# Patient Record
Sex: MALE | Race: WHITE | Employment: OTHER | URBAN - METROPOLITAN AREA
[De-identification: names, ages, dates, MRNs, and addresses within clinical notes are randomized per-mention and may not be internally consistent; named-entity substitution may affect disease eponyms.]

---

## 2017-05-26 PROBLEM — R33.9 URINARY RETENTION: Status: ACTIVE | Noted: 2017-05-26

## 2017-05-29 PROBLEM — N40.0 BPH (BENIGN PROSTATIC HYPERPLASIA): Status: ACTIVE | Noted: 2017-05-29

## 2017-05-29 PROBLEM — A41.9 SEPSIS (HCC): Status: ACTIVE | Noted: 2017-05-29

## 2017-05-29 PROBLEM — E11.9 DIABETES MELLITUS (HCC): Status: ACTIVE | Noted: 2017-05-29

## 2017-05-29 PROBLEM — N39.0 UTI (URINARY TRACT INFECTION): Status: ACTIVE | Noted: 2017-05-29

## 2018-09-26 PROBLEM — N39.0 UTI (URINARY TRACT INFECTION): Status: RESOLVED | Noted: 2017-05-29 | Resolved: 2018-09-26

## 2019-05-16 ENCOUNTER — OFFICE VISIT (OUTPATIENT)
Dept: FAMILY MEDICINE CLINIC | Age: 64
End: 2019-05-16
Payer: MEDICARE

## 2019-05-16 VITALS
HEART RATE: 112 BPM | HEIGHT: 67 IN | OXYGEN SATURATION: 95 % | SYSTOLIC BLOOD PRESSURE: 148 MMHG | WEIGHT: 176.5 LBS | BODY MASS INDEX: 27.7 KG/M2 | DIASTOLIC BLOOD PRESSURE: 98 MMHG

## 2019-05-16 DIAGNOSIS — E11.9 TYPE 2 DIABETES MELLITUS WITHOUT COMPLICATION, WITHOUT LONG-TERM CURRENT USE OF INSULIN (HCC): Primary | ICD-10-CM

## 2019-05-16 DIAGNOSIS — E78.5 HYPERLIPIDEMIA, UNSPECIFIED HYPERLIPIDEMIA TYPE: ICD-10-CM

## 2019-05-16 DIAGNOSIS — N40.0 BENIGN PROSTATIC HYPERPLASIA WITHOUT LOWER URINARY TRACT SYMPTOMS: ICD-10-CM

## 2019-05-16 DIAGNOSIS — I10 ESSENTIAL HYPERTENSION: ICD-10-CM

## 2019-05-16 LAB — HBA1C MFR BLD: 7.6 %

## 2019-05-16 PROCEDURE — 3017F COLORECTAL CA SCREEN DOC REV: CPT | Performed by: INTERNAL MEDICINE

## 2019-05-16 PROCEDURE — 2022F DILAT RTA XM EVC RTNOPTHY: CPT | Performed by: INTERNAL MEDICINE

## 2019-05-16 PROCEDURE — 1123F ACP DISCUSS/DSCN MKR DOCD: CPT | Performed by: INTERNAL MEDICINE

## 2019-05-16 PROCEDURE — 1036F TOBACCO NON-USER: CPT | Performed by: INTERNAL MEDICINE

## 2019-05-16 PROCEDURE — G8427 DOCREV CUR MEDS BY ELIG CLIN: HCPCS | Performed by: INTERNAL MEDICINE

## 2019-05-16 PROCEDURE — 4040F PNEUMOC VAC/ADMIN/RCVD: CPT | Performed by: INTERNAL MEDICINE

## 2019-05-16 PROCEDURE — 99204 OFFICE O/P NEW MOD 45 MIN: CPT | Performed by: INTERNAL MEDICINE

## 2019-05-16 PROCEDURE — 83036 HEMOGLOBIN GLYCOSYLATED A1C: CPT | Performed by: INTERNAL MEDICINE

## 2019-05-16 PROCEDURE — 3045F PR MOST RECENT HEMOGLOBIN A1C LEVEL 7.0-9.0%: CPT | Performed by: INTERNAL MEDICINE

## 2019-05-16 PROCEDURE — G8419 CALC BMI OUT NRM PARAM NOF/U: HCPCS | Performed by: INTERNAL MEDICINE

## 2019-05-16 ASSESSMENT — PATIENT HEALTH QUESTIONNAIRE - PHQ9
1. LITTLE INTEREST OR PLEASURE IN DOING THINGS: 0
2. FEELING DOWN, DEPRESSED OR HOPELESS: 0
SUM OF ALL RESPONSES TO PHQ QUESTIONS 1-9: 0
SUM OF ALL RESPONSES TO PHQ QUESTIONS 1-9: 0
SUM OF ALL RESPONSES TO PHQ9 QUESTIONS 1 & 2: 0

## 2019-05-16 NOTE — PROGRESS NOTES
Rontrace Jacobsen   1955      Reason for visit:   Chief Complaint   Patient presents with    Established New Doctor    Diabetes    Hypertension        HPI:  Patient presented establish care. He recently moved to the area from [place], [place] to be closer to his children. He was being seen by family practitioner there, Dr. Alexandra Arambula. He has a history of type 2 diabetes mellitus. His last A1c at the end of March was 8.7 per chart review. He reports since that time he has been working diligently on his diet, cutting out sugar and carbohydrates. He remains on Janumet and glipizide. He denies any complications from his diabetes including neuropathy, retinopathy, nephropathy. He does plan to schedule an appointment with an ophthalmologist for annual exam. He has history of hypertension currently managed with amlodipine. His blood pressure is elevated today. He states that his last doctor had taken him off of lisinopril but he does not recall the reason why. He denies any chest pain, shortness of breath, edema. He is on statin for hyperlipidemia without myopathy reported. He has a history of BPH and his urinary symptoms are controlled on flomax. He feels well and has no concerns today otherwise. He is a nonsomker. He had a colonoscopy ~7 years ago. He had a brother with colon cancer. Of note, I did see patients wife after seeing him. She relayed recent loss of their son to suicide. unfortunately I did not get a chance to discuss this with patient.     Past Medical History:   Diagnosis Date    Diabetes mellitus (HCC)     High blood pressure            Current Outpatient Medications:     tamsulosin (FLOMAX) 0.4 MG capsule, Take 1 capsule by mouth daily, Disp: 30 capsule, Rfl: 3    Cholecalciferol (VITAMIN D3) 2000 UNITS CAPS, Take 2,000 Units by mouth daily, Disp: , Rfl:     ibuprofen (ADVIL;MOTRIN) 800 MG tablet, Take 800 mg by mouth every 8 hours as needed, Disp: , Rfl:     Omega-3 Fatty Acids (FISH OIL) 1000 MG CAPS, Take 1,000 mg by mouth daily, Disp: , Rfl:     Ascorbic Acid (VITAMIN C) 1000 MG tablet, Take 1,000 mg by mouth daily, Disp: , Rfl:     Multiple Vitamins-Minerals (THERAPEUTIC MULTIVITAMIN-MINERALS) tablet, Take 1 tablet by mouth daily, Disp: , Rfl:     omeprazole (PRILOSEC) 20 MG capsule, Take 20 mg by mouth daily. , Disp: , Rfl:     amLODIPine (NORVASC) 10 MG tablet, Take 10 mg by mouth daily. , Disp: , Rfl:     lovastatin (MEVACOR) 20 MG tablet, Take 20 mg by mouth nightly., Disp: , Rfl:     sitaGLIPtan-metformin (JANUMET)  MG per tablet, Take 1 tablet by mouth 2 times daily (with meals). , Disp: , Rfl:     lisinopril (PRINIVIL;ZESTRIL) 20 MG tablet, Take 30 mg by mouth daily , Disp: , Rfl:     glipiZIDE (GLUCOTROL) 5 MG tablet, Take 5 mg by mouth every evening , Disp: , Rfl:      No Known Allergies    Past Surgical History:   Procedure Laterality Date    APPENDECTOMY  01/01/1984        Family History   Problem Relation Age of Onset    Kidney Disease Mother     Diabetes Mother     Cancer Brother         Social History     Socioeconomic History    Marital status:      Spouse name: Not on file    Number of children: Not on file    Years of education: Not on file    Highest education level: Not on file   Occupational History    Not on file   Social Needs    Financial resource strain: Not on file    Food insecurity:     Worry: Not on file     Inability: Not on file    Transportation needs:     Medical: Not on file     Non-medical: Not on file   Tobacco Use    Smoking status: Never Smoker    Smokeless tobacco: Never Used   Substance and Sexual Activity    Alcohol use: No    Drug use: No    Sexual activity: Not on file   Lifestyle    Physical activity:     Days per week: Not on file     Minutes per session: Not on file    Stress: Not on file   Relationships    Social connections:     Talks on phone: Not on file     Gets together: Not on file     Attends Latter-day service: Not on file     Active member of club or organization: Not on file     Attends meetings of clubs or organizations: Not on file     Relationship status: Not on file    Intimate partner violence:     Fear of current or ex partner: Not on file     Emotionally abused: Not on file     Physically abused: Not on file     Forced sexual activity: Not on file   Other Topics Concern    Not on file   Social History Narrative    Not on file       Review of Systems   Constitutional: Negative for chills, fatigue, fever and unexpected weight change. HENT: Negative for congestion, ear pain, sore throat and trouble swallowing. Eyes: Negative for pain and redness. Respiratory: Negative for cough and shortness of breath. Cardiovascular: Negative for chest pain, palpitations and leg swelling. Gastrointestinal: Negative for abdominal pain, blood in stool, constipation, diarrhea, nausea and vomiting. Endocrine: Negative for cold intolerance, heat intolerance, polydipsia and polyuria. Genitourinary: Negative for dysuria, frequency and hematuria. Musculoskeletal: Negative for arthralgias, myalgias and joint swelling. Skin: Negative for rash. Neurological: Negative for weakness, numbness and headaches. Hematological: Negative for adenopathy. Does not bruise/bleed easily. Psychiatric/Behavioral: Negative for sleep disturbance. The patient is not nervous/anxious. No report of depression    Physical Exam:  BP (!) 148/98   Pulse 112   Ht 5' 7\" (1.702 m)   Wt 176 lb 8 oz (80.1 kg)   SpO2 95%   BMI 27.64 kg/m²   Constitutional: appears well-developed and well-nourished. Head: Normocephalic and atraumatic. Eyes: Pupils are equal, round, and reactive to light.  Conjunctivae and EOM are normal.   Right Ear: External ear normal. TM normal  Left Ear: External ear normal. TM normal  Nose: Nose normal.   Mouth/Throat: Oropharynx is clear and moist.   Cardiovascular: Normal rate, regular rhythm and normal heart sounds. No murmur heard. Pulmonary/Chest: Effort normal. No respiratory distress. No wheezes, rhonchi, or crackles  Abdominal: Soft. Bowel sounds are normal. No distension. There is no tenderness. Musculoskeletal: Normal range of motion. No edema, tenderness or deformity. Lymphadenopathy: No cervical adenopathy. Neurological: alert. No cranial nerve deficit. Skin: Skin is warm and dry. Capillary refill takes less than 2 seconds. No rash noted. Psychiatric: Normal mood and affect. Assessment and Plan: Moi Gold is a 70 y.o. male presenting today to establish care. Amparo Landers was seen today for established new doctor, diabetes and hypertension. Diagnoses and all orders for this visit:    Type 2 diabetes mellitus without complication, without long-term current use of insulin (HCC)  a1c is improving with diet change. Will follow again in 3 months. He is encouraged to continue lifestyle changes. Make appt with ophthalmology. Continue current medication regimen  -     POCT glycosylated hemoglobin (Hb A1C)    Hyperlipidemia, unspecified hyperlipidemia type  Continue statin  Benign prostatic hyperplasia without lower urinary tract symptoms  Continue a blocker  Essential hypertension  bp above goal. Id like him on an ace-I/arb. We will obtain prior records to review reason for cessation prior to resuming. He will request today. Discussed colonoscopy. rec every 5 years due to family hx. He will consider    Will inquire about mood/grief at next visit    RTC in 3 months. Welcome to Medicare at that time. Spencer Cuellar M.D.   Internal Medicine and Pediatrics  [facility]

## 2019-06-05 ENCOUNTER — TELEPHONE (OUTPATIENT)
Dept: FAMILY MEDICINE CLINIC | Age: 64
End: 2019-06-05

## 2019-06-05 DIAGNOSIS — M75.20 BICEPS TENDONITIS: ICD-10-CM

## 2019-06-05 DIAGNOSIS — M25.512 SHOULDER PAIN, LEFT: ICD-10-CM

## 2019-06-05 NOTE — TELEPHONE ENCOUNTER
Requesting refills on : Amlodipine 10 mg takes 1qd, lovastatin 20 mg takes 1 qd, glipiside 5 mg takes 1 qd, omeprazole 20 mg takes 1 qd, tamsulosin hcl  . 4 mg takes 1 qd, vitamin D 2000 u takes 1 qd, and ibuprofen 800 mg takes prn. Please call into [employer] [phone].  [facility] [phone]

## 2019-06-06 ENCOUNTER — TELEPHONE (OUTPATIENT)
Dept: FAMILY MEDICINE CLINIC | Age: 64
End: 2019-06-06

## 2019-06-06 RX ORDER — ACETAMINOPHEN 160 MG
2000 TABLET,DISINTEGRATING ORAL DAILY
Qty: 30 CAPSULE | Refills: 3 | Status: SHIPPED | OUTPATIENT
Start: 2019-06-06 | End: 2019-11-07 | Stop reason: SDUPTHER

## 2019-06-06 RX ORDER — OMEPRAZOLE 20 MG/1
20 CAPSULE, DELAYED RELEASE ORAL DAILY
Qty: 30 CAPSULE | Refills: 3 | Status: SHIPPED | OUTPATIENT
Start: 2019-06-06 | End: 2019-10-22 | Stop reason: SDUPTHER

## 2019-06-06 RX ORDER — TAMSULOSIN HYDROCHLORIDE 0.4 MG/1
0.4 CAPSULE ORAL DAILY
Qty: 30 CAPSULE | Refills: 3 | Status: SHIPPED | OUTPATIENT
Start: 2019-06-06 | End: 2019-11-07 | Stop reason: SDUPTHER

## 2019-06-06 RX ORDER — GLIPIZIDE 5 MG/1
5 TABLET ORAL EVERY EVENING
Qty: 30 TABLET | Refills: 3 | Status: ON HOLD | OUTPATIENT
Start: 2019-06-06 | End: 2019-06-25 | Stop reason: SDUPTHER

## 2019-06-06 RX ORDER — AMLODIPINE BESYLATE 10 MG/1
10 TABLET ORAL DAILY
Qty: 30 TABLET | Refills: 3 | Status: SHIPPED | OUTPATIENT
Start: 2019-06-06 | End: 2019-10-22 | Stop reason: SDUPTHER

## 2019-06-06 RX ORDER — LOVASTATIN 20 MG/1
20 TABLET ORAL NIGHTLY
Qty: 30 TABLET | Refills: 3 | Status: SHIPPED | OUTPATIENT
Start: 2019-06-06 | End: 2019-10-22 | Stop reason: SDUPTHER

## 2019-06-06 RX ORDER — IBUPROFEN 800 MG/1
800 TABLET ORAL PRN
Qty: 30 TABLET | Refills: 3 | Status: SHIPPED | OUTPATIENT
Start: 2019-06-06 | End: 2020-02-06 | Stop reason: ALTCHOICE

## 2019-06-18 ENCOUNTER — HOSPITAL ENCOUNTER (INPATIENT)
Age: 64
LOS: 7 days | Discharge: HOME | DRG: 853 | End: 2019-06-25
Attending: INTERNAL MEDICINE | Admitting: INTERNAL MEDICINE
Payer: MEDICARE

## 2019-06-18 ENCOUNTER — APPOINTMENT (OUTPATIENT)
Dept: ULTRASOUND IMAGING | Age: 64
DRG: 853 | End: 2019-06-18
Payer: MEDICARE

## 2019-06-18 ENCOUNTER — APPOINTMENT (OUTPATIENT)
Dept: CT IMAGING | Age: 64
DRG: 853 | End: 2019-06-18
Payer: MEDICARE

## 2019-06-18 ENCOUNTER — ANESTHESIA EVENT (OUTPATIENT)
Dept: OPERATING ROOM | Age: 64
DRG: 853 | End: 2019-06-18
Payer: MEDICARE

## 2019-06-18 ENCOUNTER — ANESTHESIA (OUTPATIENT)
Dept: OPERATING ROOM | Age: 64
DRG: 853 | End: 2019-06-18
Payer: MEDICARE

## 2019-06-18 VITALS — DIASTOLIC BLOOD PRESSURE: 65 MMHG | SYSTOLIC BLOOD PRESSURE: 102 MMHG | OXYGEN SATURATION: 91 %

## 2019-06-18 DIAGNOSIS — A41.9 SEPSIS, DUE TO UNSPECIFIED ORGANISM: ICD-10-CM

## 2019-06-18 DIAGNOSIS — M75.20 BICEPS TENDONITIS: ICD-10-CM

## 2019-06-18 DIAGNOSIS — M25.512 SHOULDER PAIN, LEFT: ICD-10-CM

## 2019-06-18 DIAGNOSIS — N20.1 URETEROLITHIASIS: Primary | ICD-10-CM

## 2019-06-18 LAB
A/G RATIO: 1.1 (ref 1.1–2.2)
ALBUMIN SERPL-MCNC: 4.2 G/DL (ref 3.4–5)
ALP BLD-CCNC: 68 U/L (ref 40–129)
ALT SERPL-CCNC: 8 U/L (ref 10–40)
ANION GAP SERPL CALCULATED.3IONS-SCNC: 13 MMOL/L (ref 3–16)
AST SERPL-CCNC: 13 U/L (ref 15–37)
BASOPHILS ABSOLUTE: 0 K/UL (ref 0–0.2)
BASOPHILS RELATIVE PERCENT: 0.2 %
BILIRUB SERPL-MCNC: 0.8 MG/DL (ref 0–1)
BILIRUBIN URINE: NEGATIVE
BLOOD, URINE: ABNORMAL
BUN BLDV-MCNC: 16 MG/DL (ref 7–20)
CALCIUM SERPL-MCNC: 10.1 MG/DL (ref 8.3–10.6)
CHLORIDE BLD-SCNC: 99 MMOL/L (ref 99–110)
CLARITY: CLEAR
CO2: 27 MMOL/L (ref 21–32)
COLOR: YELLOW
COMMENT UA: ABNORMAL
CREAT SERPL-MCNC: 1.1 MG/DL (ref 0.8–1.3)
EOSINOPHILS ABSOLUTE: 0 K/UL (ref 0–0.6)
EOSINOPHILS RELATIVE PERCENT: 0.2 %
EPITHELIAL CELLS, UA: 2 /HPF (ref 0–5)
GFR AFRICAN AMERICAN: >60
GFR NON-AFRICAN AMERICAN: >60
GLOBULIN: 3.7 G/DL
GLUCOSE BLD-MCNC: 128 MG/DL (ref 70–99)
GLUCOSE BLD-MCNC: 134 MG/DL (ref 70–99)
GLUCOSE BLD-MCNC: 183 MG/DL (ref 70–99)
GLUCOSE URINE: NEGATIVE MG/DL
HCT VFR BLD CALC: 39.5 % (ref 40.5–52.5)
HEMOGLOBIN: 13.3 G/DL (ref 13.5–17.5)
HYALINE CASTS: 5 /LPF (ref 0–8)
KETONES, URINE: NEGATIVE MG/DL
LACTIC ACID, SEPSIS: 1.2 MMOL/L (ref 0.4–1.9)
LACTIC ACID: 1.2 MMOL/L (ref 0.4–2)
LACTIC ACID: 1.4 MMOL/L (ref 0.4–2)
LEUKOCYTE ESTERASE, URINE: NEGATIVE
LYMPHOCYTES ABSOLUTE: 0.4 K/UL (ref 1–5.1)
LYMPHOCYTES RELATIVE PERCENT: 2.3 %
MCH RBC QN AUTO: 28.9 PG (ref 26–34)
MCHC RBC AUTO-ENTMCNC: 33.7 G/DL (ref 31–36)
MCV RBC AUTO: 85.8 FL (ref 80–100)
MICROSCOPIC EXAMINATION: YES
MONOCYTES ABSOLUTE: 0.5 K/UL (ref 0–1.3)
MONOCYTES RELATIVE PERCENT: 2.6 %
NEUTROPHILS ABSOLUTE: 17.3 K/UL (ref 1.7–7.7)
NEUTROPHILS RELATIVE PERCENT: 94.7 %
NITRITE, URINE: NEGATIVE
PDW BLD-RTO: 14.9 % (ref 12.4–15.4)
PERFORMED ON: ABNORMAL
PERFORMED ON: ABNORMAL
PH UA: 6.5 (ref 5–8)
PLATELET # BLD: 219 K/UL (ref 135–450)
PMV BLD AUTO: 7 FL (ref 5–10.5)
POTASSIUM REFLEX MAGNESIUM: 4 MMOL/L (ref 3.5–5.1)
PROTEIN UA: >=300 MG/DL
RBC # BLD: 4.61 M/UL (ref 4.2–5.9)
RBC UA: 12 /HPF (ref 0–4)
SODIUM BLD-SCNC: 139 MMOL/L (ref 136–145)
SPECIFIC GRAVITY UA: >1.03 (ref 1–1.03)
TOTAL PROTEIN: 7.9 G/DL (ref 6.4–8.2)
URINE REFLEX TO CULTURE: YES
URINE TYPE: ABNORMAL
UROBILINOGEN, URINE: 0.2 E.U./DL
WBC # BLD: 18.3 K/UL (ref 4–11)
WBC UA: 18 /HPF (ref 0–5)

## 2019-06-18 PROCEDURE — 7100000001 HC PACU RECOVERY - ADDTL 15 MIN: Performed by: UROLOGY

## 2019-06-18 PROCEDURE — 85025 COMPLETE CBC W/AUTO DIFF WBC: CPT

## 2019-06-18 PROCEDURE — 2580000003 HC RX 258: Performed by: INTERNAL MEDICINE

## 2019-06-18 PROCEDURE — 51798 US URINE CAPACITY MEASURE: CPT

## 2019-06-18 PROCEDURE — 6360000004 HC RX CONTRAST MEDICATION

## 2019-06-18 PROCEDURE — 2580000003 HC RX 258: Performed by: PHYSICIAN ASSISTANT

## 2019-06-18 PROCEDURE — 2580000003 HC RX 258: Performed by: UROLOGY

## 2019-06-18 PROCEDURE — C2617 STENT, NON-COR, TEM W/O DEL: HCPCS | Performed by: UROLOGY

## 2019-06-18 PROCEDURE — 6360000002 HC RX W HCPCS: Performed by: INTERNAL MEDICINE

## 2019-06-18 PROCEDURE — 87040 BLOOD CULTURE FOR BACTERIA: CPT

## 2019-06-18 PROCEDURE — 3700000000 HC ANESTHESIA ATTENDED CARE: Performed by: UROLOGY

## 2019-06-18 PROCEDURE — 83605 ASSAY OF LACTIC ACID: CPT

## 2019-06-18 PROCEDURE — 2709999900 HC NON-CHARGEABLE SUPPLY: Performed by: UROLOGY

## 2019-06-18 PROCEDURE — 99285 EMERGENCY DEPT VISIT HI MDM: CPT

## 2019-06-18 PROCEDURE — 6370000000 HC RX 637 (ALT 250 FOR IP): Performed by: PHYSICIAN ASSISTANT

## 2019-06-18 PROCEDURE — 36415 COLL VENOUS BLD VENIPUNCTURE: CPT

## 2019-06-18 PROCEDURE — 81001 URINALYSIS AUTO W/SCOPE: CPT

## 2019-06-18 PROCEDURE — 6360000002 HC RX W HCPCS: Performed by: NURSE ANESTHETIST, CERTIFIED REGISTERED

## 2019-06-18 PROCEDURE — 87086 URINE CULTURE/COLONY COUNT: CPT

## 2019-06-18 PROCEDURE — 80053 COMPREHEN METABOLIC PANEL: CPT

## 2019-06-18 PROCEDURE — 74177 CT ABD & PELVIS W/CONTRAST: CPT

## 2019-06-18 PROCEDURE — 2580000003 HC RX 258: Performed by: NURSE ANESTHETIST, CERTIFIED REGISTERED

## 2019-06-18 PROCEDURE — 96365 THER/PROPH/DIAG IV INF INIT: CPT

## 2019-06-18 PROCEDURE — 6370000000 HC RX 637 (ALT 250 FOR IP): Performed by: INTERNAL MEDICINE

## 2019-06-18 PROCEDURE — 3600000002 HC SURGERY LEVEL 2 BASE: Performed by: UROLOGY

## 2019-06-18 PROCEDURE — 6360000002 HC RX W HCPCS: Performed by: PHYSICIAN ASSISTANT

## 2019-06-18 PROCEDURE — 96375 TX/PRO/DX INJ NEW DRUG ADDON: CPT

## 2019-06-18 PROCEDURE — 7100000000 HC PACU RECOVERY - FIRST 15 MIN: Performed by: UROLOGY

## 2019-06-18 PROCEDURE — 1200000000 HC SEMI PRIVATE

## 2019-06-18 PROCEDURE — 3600000012 HC SURGERY LEVEL 2 ADDTL 15MIN: Performed by: UROLOGY

## 2019-06-18 PROCEDURE — 3700000001 HC ADD 15 MINUTES (ANESTHESIA): Performed by: UROLOGY

## 2019-06-18 PROCEDURE — C1769 GUIDE WIRE: HCPCS | Performed by: UROLOGY

## 2019-06-18 PROCEDURE — 2500000003 HC RX 250 WO HCPCS: Performed by: NURSE ANESTHETIST, CERTIFIED REGISTERED

## 2019-06-18 PROCEDURE — 76870 US EXAM SCROTUM: CPT

## 2019-06-18 PROCEDURE — 96361 HYDRATE IV INFUSION ADD-ON: CPT

## 2019-06-18 PROCEDURE — 0T778DZ DILATION OF LEFT URETER WITH INTRALUMINAL DEVICE, VIA NATURAL OR ARTIFICIAL OPENING ENDOSCOPIC: ICD-10-PCS | Performed by: UROLOGY

## 2019-06-18 DEVICE — STENT URET 6FR L26CM PERCFLX HYDR+ TAPR TIP GRAD: Type: IMPLANTABLE DEVICE | Site: URETER | Status: FUNCTIONAL

## 2019-06-18 RX ORDER — 0.9 % SODIUM CHLORIDE 0.9 %
1000 INTRAVENOUS SOLUTION INTRAVENOUS ONCE
Status: COMPLETED | OUTPATIENT
Start: 2019-06-18 | End: 2019-06-18

## 2019-06-18 RX ORDER — CHLORAL HYDRATE 500 MG
1000 CAPSULE ORAL DAILY
Status: DISCONTINUED | OUTPATIENT
Start: 2019-06-18 | End: 2019-06-18 | Stop reason: CLARIF

## 2019-06-18 RX ORDER — FENTANYL CITRATE 50 UG/ML
25 INJECTION, SOLUTION INTRAMUSCULAR; INTRAVENOUS EVERY 5 MIN PRN
Status: DISCONTINUED | OUTPATIENT
Start: 2019-06-18 | End: 2019-06-18 | Stop reason: HOSPADM

## 2019-06-18 RX ORDER — AMLODIPINE BESYLATE 5 MG/1
10 TABLET ORAL ONCE
Status: COMPLETED | OUTPATIENT
Start: 2019-06-18 | End: 2019-06-18

## 2019-06-18 RX ORDER — TERAZOSIN 2 MG/1
2 CAPSULE ORAL DAILY
COMMUNITY
End: 2019-11-07 | Stop reason: SDUPTHER

## 2019-06-18 RX ORDER — ONDANSETRON 2 MG/ML
4 INJECTION INTRAMUSCULAR; INTRAVENOUS
Status: DISCONTINUED | OUTPATIENT
Start: 2019-06-18 | End: 2019-06-18 | Stop reason: HOSPADM

## 2019-06-18 RX ORDER — OXYCODONE HYDROCHLORIDE AND ACETAMINOPHEN 5; 325 MG/1; MG/1
2 TABLET ORAL PRN
Status: DISCONTINUED | OUTPATIENT
Start: 2019-06-18 | End: 2019-06-18 | Stop reason: HOSPADM

## 2019-06-18 RX ORDER — POTASSIUM CHLORIDE 20 MEQ/1
40 TABLET, EXTENDED RELEASE ORAL PRN
Status: DISCONTINUED | OUTPATIENT
Start: 2019-06-18 | End: 2019-06-25 | Stop reason: HOSPADM

## 2019-06-18 RX ORDER — SODIUM CHLORIDE 0.9 % (FLUSH) 0.9 %
10 SYRINGE (ML) INJECTION PRN
Status: DISCONTINUED | OUTPATIENT
Start: 2019-06-18 | End: 2019-06-18 | Stop reason: SDUPTHER

## 2019-06-18 RX ORDER — LOVASTATIN 10 MG/1
20 TABLET ORAL NIGHTLY
Status: DISCONTINUED | OUTPATIENT
Start: 2019-06-18 | End: 2019-06-25 | Stop reason: HOSPADM

## 2019-06-18 RX ORDER — PROPOFOL 10 MG/ML
INJECTION, EMULSION INTRAVENOUS PRN
Status: DISCONTINUED | OUTPATIENT
Start: 2019-06-18 | End: 2019-06-18 | Stop reason: SDUPTHER

## 2019-06-18 RX ORDER — ONDANSETRON 2 MG/ML
4 INJECTION INTRAMUSCULAR; INTRAVENOUS EVERY 6 HOURS PRN
Status: DISCONTINUED | OUTPATIENT
Start: 2019-06-18 | End: 2019-06-25 | Stop reason: HOSPADM

## 2019-06-18 RX ORDER — SODIUM CHLORIDE 9 MG/ML
INJECTION, SOLUTION INTRAVENOUS CONTINUOUS
Status: DISCONTINUED | OUTPATIENT
Start: 2019-06-18 | End: 2019-06-21

## 2019-06-18 RX ORDER — LIDOCAINE HYDROCHLORIDE 20 MG/ML
INJECTION, SOLUTION EPIDURAL; INFILTRATION; INTRACAUDAL; PERINEURAL PRN
Status: DISCONTINUED | OUTPATIENT
Start: 2019-06-18 | End: 2019-06-18 | Stop reason: SDUPTHER

## 2019-06-18 RX ORDER — MAGNESIUM HYDROXIDE 1200 MG/15ML
LIQUID ORAL
Status: COMPLETED | OUTPATIENT
Start: 2019-06-18 | End: 2019-06-18

## 2019-06-18 RX ORDER — DEXTROSE MONOHYDRATE 25 G/50ML
12.5 INJECTION, SOLUTION INTRAVENOUS PRN
Status: DISCONTINUED | OUTPATIENT
Start: 2019-06-18 | End: 2019-06-25 | Stop reason: HOSPADM

## 2019-06-18 RX ORDER — TAMSULOSIN HYDROCHLORIDE 0.4 MG/1
0.4 CAPSULE ORAL DAILY
Status: DISCONTINUED | OUTPATIENT
Start: 2019-06-18 | End: 2019-06-25 | Stop reason: HOSPADM

## 2019-06-18 RX ORDER — NICOTINE 21 MG/24HR
1 PATCH, TRANSDERMAL 24 HOURS TRANSDERMAL DAILY PRN
Status: DISCONTINUED | OUTPATIENT
Start: 2019-06-18 | End: 2019-06-25 | Stop reason: HOSPADM

## 2019-06-18 RX ORDER — SODIUM CHLORIDE, SODIUM LACTATE, POTASSIUM CHLORIDE, CALCIUM CHLORIDE 600; 310; 30; 20 MG/100ML; MG/100ML; MG/100ML; MG/100ML
INJECTION, SOLUTION INTRAVENOUS CONTINUOUS PRN
Status: DISCONTINUED | OUTPATIENT
Start: 2019-06-18 | End: 2019-06-18 | Stop reason: SDUPTHER

## 2019-06-18 RX ORDER — MORPHINE SULFATE 2 MG/ML
2 INJECTION, SOLUTION INTRAMUSCULAR; INTRAVENOUS
Status: DISCONTINUED | OUTPATIENT
Start: 2019-06-18 | End: 2019-06-25 | Stop reason: HOSPADM

## 2019-06-18 RX ORDER — FENTANYL CITRATE 50 UG/ML
INJECTION, SOLUTION INTRAMUSCULAR; INTRAVENOUS PRN
Status: DISCONTINUED | OUTPATIENT
Start: 2019-06-18 | End: 2019-06-18 | Stop reason: SDUPTHER

## 2019-06-18 RX ORDER — HYDROCODONE BITARTRATE AND ACETAMINOPHEN 5; 325 MG/1; MG/1
1 TABLET ORAL EVERY 4 HOURS PRN
Status: DISCONTINUED | OUTPATIENT
Start: 2019-06-18 | End: 2019-06-25 | Stop reason: HOSPADM

## 2019-06-18 RX ORDER — FAMOTIDINE 20 MG/1
20 TABLET, FILM COATED ORAL 2 TIMES DAILY PRN
Status: DISCONTINUED | OUTPATIENT
Start: 2019-06-18 | End: 2019-06-25 | Stop reason: HOSPADM

## 2019-06-18 RX ORDER — MAGNESIUM SULFATE 1 G/100ML
1 INJECTION INTRAVENOUS PRN
Status: DISCONTINUED | OUTPATIENT
Start: 2019-06-18 | End: 2019-06-25 | Stop reason: HOSPADM

## 2019-06-18 RX ORDER — ACETAMINOPHEN 500 MG
1000 TABLET ORAL ONCE
Status: COMPLETED | OUTPATIENT
Start: 2019-06-18 | End: 2019-06-18

## 2019-06-18 RX ORDER — LACTOBACILLUS RHAMNOSUS GG 10B CELL
1 CAPSULE ORAL
Status: DISCONTINUED | OUTPATIENT
Start: 2019-06-19 | End: 2019-06-25 | Stop reason: HOSPADM

## 2019-06-18 RX ORDER — MORPHINE SULFATE 2 MG/ML
4 INJECTION, SOLUTION INTRAMUSCULAR; INTRAVENOUS EVERY 30 MIN PRN
Status: DISCONTINUED | OUTPATIENT
Start: 2019-06-18 | End: 2019-06-18 | Stop reason: ALTCHOICE

## 2019-06-18 RX ORDER — SODIUM CHLORIDE 0.9 % (FLUSH) 0.9 %
10 SYRINGE (ML) INJECTION EVERY 12 HOURS SCHEDULED
Status: DISCONTINUED | OUTPATIENT
Start: 2019-06-18 | End: 2019-06-18 | Stop reason: SDUPTHER

## 2019-06-18 RX ORDER — HYDROCODONE BITARTRATE AND ACETAMINOPHEN 5; 325 MG/1; MG/1
2 TABLET ORAL EVERY 4 HOURS PRN
Status: DISCONTINUED | OUTPATIENT
Start: 2019-06-18 | End: 2019-06-25 | Stop reason: HOSPADM

## 2019-06-18 RX ORDER — MIDAZOLAM HYDROCHLORIDE 1 MG/ML
INJECTION INTRAMUSCULAR; INTRAVENOUS PRN
Status: DISCONTINUED | OUTPATIENT
Start: 2019-06-18 | End: 2019-06-18 | Stop reason: SDUPTHER

## 2019-06-18 RX ORDER — LISINOPRIL 10 MG/1
30 TABLET ORAL ONCE
Status: DISCONTINUED | OUTPATIENT
Start: 2019-06-18 | End: 2019-06-18

## 2019-06-18 RX ORDER — SODIUM CHLORIDE 0.9 % (FLUSH) 0.9 %
10 SYRINGE (ML) INJECTION PRN
Status: DISCONTINUED | OUTPATIENT
Start: 2019-06-18 | End: 2019-06-25 | Stop reason: HOSPADM

## 2019-06-18 RX ORDER — SODIUM CHLORIDE 0.9 % (FLUSH) 0.9 %
10 SYRINGE (ML) INJECTION EVERY 12 HOURS SCHEDULED
Status: DISCONTINUED | OUTPATIENT
Start: 2019-06-18 | End: 2019-06-25 | Stop reason: HOSPADM

## 2019-06-18 RX ORDER — MORPHINE SULFATE 4 MG/ML
4 INJECTION, SOLUTION INTRAMUSCULAR; INTRAVENOUS
Status: DISCONTINUED | OUTPATIENT
Start: 2019-06-18 | End: 2019-06-25 | Stop reason: HOSPADM

## 2019-06-18 RX ORDER — POTASSIUM CHLORIDE 7.45 MG/ML
10 INJECTION INTRAVENOUS PRN
Status: DISCONTINUED | OUTPATIENT
Start: 2019-06-18 | End: 2019-06-25 | Stop reason: HOSPADM

## 2019-06-18 RX ORDER — ASCORBIC ACID 500 MG
1000 TABLET ORAL DAILY
Status: DISCONTINUED | OUTPATIENT
Start: 2019-06-18 | End: 2019-06-25 | Stop reason: HOSPADM

## 2019-06-18 RX ORDER — ONDANSETRON 2 MG/ML
4 INJECTION INTRAMUSCULAR; INTRAVENOUS ONCE
Status: COMPLETED | OUTPATIENT
Start: 2019-06-18 | End: 2019-06-18

## 2019-06-18 RX ORDER — PROPOFOL 10 MG/ML
INJECTION, EMULSION INTRAVENOUS CONTINUOUS PRN
Status: DISCONTINUED | OUTPATIENT
Start: 2019-06-18 | End: 2019-06-18 | Stop reason: SDUPTHER

## 2019-06-18 RX ORDER — DEXTROSE MONOHYDRATE 50 MG/ML
100 INJECTION, SOLUTION INTRAVENOUS PRN
Status: DISCONTINUED | OUTPATIENT
Start: 2019-06-18 | End: 2019-06-25 | Stop reason: HOSPADM

## 2019-06-18 RX ORDER — NICOTINE POLACRILEX 4 MG
15 LOZENGE BUCCAL PRN
Status: DISCONTINUED | OUTPATIENT
Start: 2019-06-18 | End: 2019-06-25 | Stop reason: HOSPADM

## 2019-06-18 RX ORDER — KETOROLAC TROMETHAMINE 30 MG/ML
15 INJECTION, SOLUTION INTRAMUSCULAR; INTRAVENOUS ONCE
Status: COMPLETED | OUTPATIENT
Start: 2019-06-18 | End: 2019-06-18

## 2019-06-18 RX ORDER — AMLODIPINE BESYLATE 10 MG/1
10 TABLET ORAL DAILY
Status: DISCONTINUED | OUTPATIENT
Start: 2019-06-18 | End: 2019-06-25 | Stop reason: HOSPADM

## 2019-06-18 RX ORDER — PANTOPRAZOLE SODIUM 40 MG/1
40 TABLET, DELAYED RELEASE ORAL
Status: DISCONTINUED | OUTPATIENT
Start: 2019-06-19 | End: 2019-06-25 | Stop reason: HOSPADM

## 2019-06-18 RX ORDER — M-VIT,TX,IRON,MINS/CALC/FOLIC 27MG-0.4MG
1 TABLET ORAL DAILY
Status: DISCONTINUED | OUTPATIENT
Start: 2019-06-18 | End: 2019-06-25 | Stop reason: HOSPADM

## 2019-06-18 RX ORDER — ACETAMINOPHEN 325 MG/1
650 TABLET ORAL EVERY 4 HOURS PRN
Status: DISCONTINUED | OUTPATIENT
Start: 2019-06-18 | End: 2019-06-25 | Stop reason: HOSPADM

## 2019-06-18 RX ORDER — 0.9 % SODIUM CHLORIDE 0.9 %
1000 INTRAVENOUS SOLUTION INTRAVENOUS ONCE
Status: DISCONTINUED | OUTPATIENT
Start: 2019-06-18 | End: 2019-06-25 | Stop reason: HOSPADM

## 2019-06-18 RX ORDER — OXYCODONE HYDROCHLORIDE AND ACETAMINOPHEN 5; 325 MG/1; MG/1
1 TABLET ORAL PRN
Status: DISCONTINUED | OUTPATIENT
Start: 2019-06-18 | End: 2019-06-18 | Stop reason: HOSPADM

## 2019-06-18 RX ADMIN — FENTANYL CITRATE 50 MCG: 50 INJECTION INTRAMUSCULAR; INTRAVENOUS at 17:27

## 2019-06-18 RX ADMIN — PROPOFOL 180 MCG/KG/MIN: 10 INJECTION, EMULSION INTRAVENOUS at 17:28

## 2019-06-18 RX ADMIN — SODIUM CHLORIDE 1000 ML: 9 INJECTION, SOLUTION INTRAVENOUS at 16:01

## 2019-06-18 RX ADMIN — SODIUM CHLORIDE 1000 ML: 9 INJECTION, SOLUTION INTRAVENOUS at 14:46

## 2019-06-18 RX ADMIN — TAMSULOSIN HYDROCHLORIDE 0.4 MG: 0.4 CAPSULE ORAL at 20:59

## 2019-06-18 RX ADMIN — ACETAMINOPHEN 1000 MG: 500 TABLET ORAL at 14:37

## 2019-06-18 RX ADMIN — IOPAMIDOL 75 ML: 755 INJECTION, SOLUTION INTRAVENOUS at 15:04

## 2019-06-18 RX ADMIN — KETOROLAC TROMETHAMINE 15 MG: 30 INJECTION, SOLUTION INTRAMUSCULAR at 16:02

## 2019-06-18 RX ADMIN — LIDOCAINE HYDROCHLORIDE 50 MG: 20 INJECTION, SOLUTION EPIDURAL; INFILTRATION; INTRACAUDAL; PERINEURAL at 17:28

## 2019-06-18 RX ADMIN — LOVASTATIN 20 MG: 10 TABLET ORAL at 20:49

## 2019-06-18 RX ADMIN — FAMOTIDINE 20 MG: 20 TABLET ORAL at 20:49

## 2019-06-18 RX ADMIN — AMLODIPINE BESYLATE 10 MG: 5 TABLET ORAL at 16:02

## 2019-06-18 RX ADMIN — MULTIPLE VITAMINS W/ MINERALS TAB 1 TABLET: TAB at 20:59

## 2019-06-18 RX ADMIN — MORPHINE SULFATE 4 MG: 2 INJECTION, SOLUTION INTRAMUSCULAR; INTRAVENOUS at 16:02

## 2019-06-18 RX ADMIN — ONDANSETRON 4 MG: 2 INJECTION INTRAMUSCULAR; INTRAVENOUS at 16:01

## 2019-06-18 RX ADMIN — OXYCODONE HYDROCHLORIDE AND ACETAMINOPHEN 1000 MG: 500 TABLET ORAL at 20:59

## 2019-06-18 RX ADMIN — FENTANYL CITRATE 50 MCG: 50 INJECTION INTRAMUSCULAR; INTRAVENOUS at 17:35

## 2019-06-18 RX ADMIN — SODIUM CHLORIDE: 9 INJECTION, SOLUTION INTRAVENOUS at 20:49

## 2019-06-18 RX ADMIN — HYDROCODONE BITARTRATE AND ACETAMINOPHEN 2 TABLET: 5; 325 TABLET ORAL at 20:49

## 2019-06-18 RX ADMIN — CEFTRIAXONE 1 G: 1 INJECTION, POWDER, FOR SOLUTION INTRAMUSCULAR; INTRAVENOUS at 23:29

## 2019-06-18 RX ADMIN — SODIUM CHLORIDE, SODIUM LACTATE, POTASSIUM CHLORIDE, AND CALCIUM CHLORIDE: .6; .31; .03; .02 INJECTION, SOLUTION INTRAVENOUS at 17:37

## 2019-06-18 RX ADMIN — CEFEPIME HYDROCHLORIDE 2 G: 2 INJECTION, POWDER, FOR SOLUTION INTRAVENOUS at 16:17

## 2019-06-18 RX ADMIN — MIDAZOLAM 2 MG: 1 INJECTION INTRAMUSCULAR; INTRAVENOUS at 17:25

## 2019-06-18 RX ADMIN — AMLODIPINE BESYLATE 10 MG: 10 TABLET ORAL at 20:50

## 2019-06-18 RX ADMIN — PROPOFOL 100 MG: 10 INJECTION, EMULSION INTRAVENOUS at 17:28

## 2019-06-18 ASSESSMENT — PULMONARY FUNCTION TESTS
PIF_VALUE: 1
PIF_VALUE: 0
PIF_VALUE: 1
PIF_VALUE: 1
PIF_VALUE: 0
PIF_VALUE: 1
PIF_VALUE: 1

## 2019-06-18 ASSESSMENT — PAIN SCALES - GENERAL
PAINLEVEL_OUTOF10: 7
PAINLEVEL_OUTOF10: 0
PAINLEVEL_OUTOF10: 6
PAINLEVEL_OUTOF10: 5
PAINLEVEL_OUTOF10: 7
PAINLEVEL_OUTOF10: 0
PAINLEVEL_OUTOF10: 7
PAINLEVEL_OUTOF10: 7
PAINLEVEL_OUTOF10: 0
PAINLEVEL_OUTOF10: 0
PAINLEVEL_OUTOF10: 7

## 2019-06-18 ASSESSMENT — ENCOUNTER SYMPTOMS
NAUSEA: 0
DIARRHEA: 0
EYE PAIN: 0
BACK PAIN: 0
SHORTNESS OF BREATH: 0
VOMITING: 0
ABDOMINAL PAIN: 1
SHORTNESS OF BREATH: 0
COUGH: 0

## 2019-06-18 ASSESSMENT — PAIN DESCRIPTION - PROGRESSION
CLINICAL_PROGRESSION: GRADUALLY WORSENING
CLINICAL_PROGRESSION: NOT CHANGED

## 2019-06-18 ASSESSMENT — PAIN DESCRIPTION - PAIN TYPE
TYPE: ACUTE PAIN

## 2019-06-18 ASSESSMENT — PAIN DESCRIPTION - DESCRIPTORS
DESCRIPTORS: ACHING
DESCRIPTORS: ACHING
DESCRIPTORS: ACHING;CONSTANT

## 2019-06-18 ASSESSMENT — PAIN DESCRIPTION - FREQUENCY
FREQUENCY: CONTINUOUS

## 2019-06-18 ASSESSMENT — PAIN DESCRIPTION - LOCATION
LOCATION: FLANK
LOCATION: FLANK
LOCATION: SCROTUM

## 2019-06-18 ASSESSMENT — PAIN DESCRIPTION - ORIENTATION
ORIENTATION: LEFT
ORIENTATION: LEFT

## 2019-06-18 ASSESSMENT — PAIN - FUNCTIONAL ASSESSMENT
PAIN_FUNCTIONAL_ASSESSMENT: 0-10
PAIN_FUNCTIONAL_ASSESSMENT: ACTIVITIES ARE NOT PREVENTED
PAIN_FUNCTIONAL_ASSESSMENT: ACTIVITIES ARE NOT PREVENTED

## 2019-06-18 ASSESSMENT — PAIN DESCRIPTION - ONSET
ONSET: GRADUAL
ONSET: GRADUAL

## 2019-06-18 ASSESSMENT — PAIN SCALES - WONG BAKER: WONGBAKER_NUMERICALRESPONSE: 8

## 2019-06-18 NOTE — ANESTHESIA POSTPROCEDURE EVALUATION
Department of Anesthesiology  Postprocedure Note    Patient: Jace Bamberger  MRN: [id number]  YOB: 1955  Date of evaluation: 6/18/2019  Time:  6:58 PM     Procedure Summary     Date:  06/18/19 Room / Location:  [facility] OR 07 / [facility] OR    Anesthesia Start:  1727 Anesthesia Stop:  1745    Procedure:  CYSTOSCOPY LEFT URETERAL STENT INSERTION (Left Bladder) Diagnosis:  (LEFT URETHRAL STENT)    Surgeon:  Lorrie Madera MD Responsible Provider:  Williams Madrid MD    Anesthesia Type:  TIVA ASA Status:  3          Anesthesia Type: TIVA    Angeles Phase I: Angeles Score: 9    Angeles Phase II:      Last vitals: Reviewed and per EMR flowsheets.        Anesthesia Post Evaluation    Patient location during evaluation: PACU  Patient participation: complete - patient participated  Level of consciousness: awake and alert  Airway patency: patent  Nausea & Vomiting: no nausea and no vomiting  Complications: no  Cardiovascular status: hemodynamically stable  Respiratory status: acceptable  Hydration status: stable

## 2019-06-18 NOTE — ED PROVIDER NOTES
**EVALUATED BY ADVANCED PRACTICE PROVIDER**        [address] ENCOUNTER      Pt Name: Tucker Nowak  IGX:[id number]  [place] 1955  Date of evaluation: 6/18/2019  Provider: JANEY Ramey      Chief Complaint:    Chief Complaint   Patient presents with    Abdominal Pain    Groin Swelling    Hypertension    Headache       Nursing Notes, Past Medical Hx, Past Surgical Hx, Social Hx, Allergies, and Family Hx were all reviewed and agreed with or any disagreements were addressed in the HPI.    HPI:  (Location, Duration, Timing, Severity,Quality, Assoc Sx, Context, Modifying factors)  This is a  70 y.o. male with past medical history of NIDDM, hypertension, BPH, who presents to the emergency department with complaints of a 4-day history of testicular pain, lower abdominal pain. Patient was unaware that he had a fever, or arrives with a temperature of 101.6, heart rate of 130. Rates his pain as a 7/10. Constant. Aching. Denies any fever, chills that he was aware of, nausea, vomiting, diarrhea or constipation. He does endorse a mild headache over the last few days. No recent travel, exposure to sick contacts, or recent antibiotics. No other acute concerns, associated symptoms or modifying factors. No dysuria or hematuria, frequency or urgency.          PastMedical/Surgical History:      Diagnosis Date    Diabetes mellitus ([address]     High blood pressure          Procedure Laterality Date    APPENDECTOMY  01/01/1984       Medications:  Previous Medications    AMLODIPINE (NORVASC) 10 MG TABLET    Take 1 tablet by mouth daily    ASCORBIC ACID (VITAMIN C) 1000 MG TABLET    Take 1,000 mg by mouth daily    CHOLECALCIFEROL (VITAMIN D3) 2000 UNITS CAPS    Take 1 capsule by mouth daily    GLIPIZIDE (GLUCOTROL) 5 MG TABLET    Take 1 tablet by mouth every evening    IBUPROFEN (ADVIL;MOTRIN) 800 MG TABLET    Take 1 tablet by mouth as needed for Pain LISINOPRIL (PRINIVIL;ZESTRIL) 20 MG TABLET    Take 30 mg by mouth daily     LOVASTATIN (MEVACOR) 20 MG TABLET    Take 1 tablet by mouth nightly    MULTIPLE VITAMINS-MINERALS (THERAPEUTIC MULTIVITAMIN-MINERALS) TABLET    Take 1 tablet by mouth daily    OMEGA-3 FATTY ACIDS (FISH OIL) 1000 MG CAPS    Take 1,000 mg by mouth daily    OMEPRAZOLE (PRILOSEC) 20 MG DELAYED RELEASE CAPSULE    Take 1 capsule by mouth daily    SITAGLIPTAN-METFORMIN (JANUMET)  MG PER TABLET    Take 1 tablet by mouth 2 times daily (with meals). TAMSULOSIN (FLOMAX) 0.4 MG CAPSULE    Take 1 capsule by mouth daily         Review of Systems:  Review of Systems   Constitutional: Negative for chills, fatigue and fever. Eyes: Negative for pain. Respiratory: Negative for cough and shortness of breath. Cardiovascular: Negative for chest pain. Gastrointestinal: Positive for abdominal pain. Negative for diarrhea, nausea and vomiting. Genitourinary: Positive for testicular pain. Negative for dysuria. Musculoskeletal: Negative for back pain, neck pain and neck stiffness. Skin: Negative for rash. Neurological: Positive for headaches. Negative for dizziness. Psychiatric/Behavioral: Negative for confusion. Positives and Pertinent negatives as per HPI. Except as noted above in the ROS, problem specific ROS was completed and is negative. Physical Exam:  Physical Exam   Constitutional: He is oriented to person, place, and time. He appears well-developed. No distress. Appears uncomfortable   HENT:   Head: Normocephalic and atraumatic. Eyes: Right eye exhibits no discharge. Left eye exhibits no discharge. Neck: Normal range of motion. Neck supple. Cardiovascular: Regular rhythm. Tachycardic   Pulmonary/Chest: Effort normal. No stridor. No respiratory distress. Abdominal: Soft. He exhibits no distension and no mass. There is no tenderness. There is no guarding. Musculoskeletal: Normal range of motion.    Neurological: He is alert and oriented to person, place, and time. No gross facial drooping. Moves all 4 extremities spontaneously. Skin: Skin is warm and dry. He is not diaphoretic. No pallor. Psychiatric: He has a normal mood and affect. His behavior is normal.   Nursing note and vitals reviewed.       MEDICAL DECISION MAKING    Vitals:    Vitals:    06/18/19 1451 06/18/19 1513 06/18/19 1525 06/18/19 1542   BP: (!) 168/95 (!) 166/88 (!) 166/87 (!) 163/84   Pulse: 138 139 141 141   Resp: (!) 31 23 28 (!) 31   Temp:       TempSrc:       SpO2: 95% 95% 95% 94%   Weight:       Height:           LABS:  Labs Reviewed   CBC WITH AUTO DIFFERENTIAL - Abnormal; Notable for the following components:       Result Value    WBC 18.3 (*)     Hemoglobin 13.3 (*)     Hematocrit 39.5 (*)     Neutrophils # 17.3 (*)     Lymphocytes # 0.4 (*)     All other components within normal limits    Narrative:     Performed at:  [address] [place] [address]   Phone [phone]   COMPREHENSIVE METABOLIC PANEL W/ REFLEX TO MG FOR LOW K - Abnormal; Notable for the following components:    Glucose 183 (*)     ALT 8 (*)     AST 13 (*)     All other components within normal limits    Narrative:     Performed at:  [address] [place] [address]   Phone [phone]   URINE RT REFLEX TO CULTURE - Abnormal; Notable for the following components:    Blood, Urine MODERATE (*)     Protein, UA >=300 (*)     All other components within normal limits    Narrative:     Performed at:  [address] [place] [address]   Phone [phone]   MICROSCOPIC URINALYSIS - Abnormal; Notable for the following components:    WBC, UA 18 (*)     RBC, UA 12 (*)     All other components within normal limits    Narrative:     Performed at:  [facility]  [facility], % injection 75 mL (75 mLs Intravenous Given 6/18/19 9825)       Differential diagnosis: Abdominal Aortic Aneurysm, acute coronary syndrome, Ischemic Bowel, Bowel Obstruction, PUD, GERD, Acute Cholecystitis, Pancreatitis, Hepatitis, Colitis, SMA Syndrome, Mesenteric Steal Syndrome, Splanchnic Vein Thrombosis, Acute Appendicitis, Diverticulitis, Pyelonephritis, UTI, STD, Torsion, other    Patient seen and examined today for fever, abdominal/scrotal pain. See HPI for patient presentation. P patient with a fever of 101.6, tachycardic pulse 130. Abdomen is soft nontender although he reports lower abdominal pain extending down to the testicles. Testicular scrotal ultrasound is negative for acute process. He does have 2 small hydroceles. CT reveals large 7 mm stone at the left UVJ causing mild hydronephrosis. Separate renal 9 mm stone in the lumen of the bladder, otherwise no acute findings. Meet sepsis criteria with his temperature of 101.6, heart rate 136, white count of 18,000. Negative lactic. Ordered Rocephin 2 g, IV fluids. No severe electrolyte abnormality or renal impairment. Urology consult placed. Will also consult the hospitalist for admission. His blood pressure was elevated 163/84, he admits he did not take his blood pressure medications today. Ordered home meds of amlodipine and lisinopril. Pain not improved after Tylenol, have ordered morphine and Toradol. Because of high probability of sudden clinical deterioration of the patient's condition or  further deterioration, critical care time included my full attention to the patient's condition, including chart data review, documentation, medication ordering, reviewing the patient's old records, reevaluation patient's cardiac, pulmonary and neurological status. Reassessment of vital signs. Consultations with Urology and admitting physician. Ordering, interpreting reviewing diagnostic testing.  Therefore, my critical care time was 30 minutes of direct attention to the patient's condition did not include time spent on procedures.       Results for orders placed or performed during the hospital encounter of 06/18/19   CBC Auto Differential   Result Value Ref Range    WBC 18.3 (H) 4.0 - 11.0 K/uL    RBC 4.61 4.20 - 5.90 M/uL    Hemoglobin 13.3 (L) 13.5 - 17.5 g/dL    Hematocrit 39.5 (L) 40.5 - 52.5 %    MCV 85.8 80.0 - 100.0 fL    MCH 28.9 26.0 - 34.0 pg    MCHC 33.7 31.0 - 36.0 g/dL    RDW 14.9 12.4 - 15.4 %    Platelets 393 248 - 038 K/uL    MPV 7.0 5.0 - 10.5 fL    Neutrophils % 94.7 %    Lymphocytes % 2.3 %    Monocytes % 2.6 %    Eosinophils % 0.2 %    Basophils % 0.2 %    Neutrophils # 17.3 (H) 1.7 - 7.7 K/uL    Lymphocytes # 0.4 (L) 1.0 - 5.1 K/uL    Monocytes # 0.5 0.0 - 1.3 K/uL    Eosinophils # 0.0 0.0 - 0.6 K/uL    Basophils # 0.0 0.0 - 0.2 K/uL   Comprehensive Metabolic Panel w/ Reflex to MG   Result Value Ref Range    Sodium 139 136 - 145 mmol/L    Potassium reflex Magnesium 4.0 3.5 - 5.1 mmol/L    Chloride 99 99 - 110 mmol/L    CO2 27 21 - 32 mmol/L    Anion Gap 13 3 - 16    Glucose 183 (H) 70 - 99 mg/dL    BUN 16 7 - 20 mg/dL    CREATININE 1.1 0.8 - 1.3 mg/dL    GFR Non-African American >60 >60    GFR African American >60 >60    Calcium 10.1 8.3 - 10.6 mg/dL    Total Protein 7.9 6.4 - 8.2 g/dL    Alb 4.2 3.4 - 5.0 g/dL    Albumin/Globulin Ratio 1.1 1.1 - 2.2    Total Bilirubin 0.8 0.0 - 1.0 mg/dL    Alkaline Phosphatase 68 40 - 129 U/L    ALT 8 (L) 10 - 40 U/L    AST 13 (L) 15 - 37 U/L    Globulin 3.7 g/dL   Urinalysis Reflex to Culture   Result Value Ref Range    Color, UA YELLOW Straw/Yellow    Clarity, UA Clear Clear    Glucose, Ur Negative Negative mg/dL    Bilirubin Urine Negative Negative    Ketones, Urine Negative Negative mg/dL    Specific Gravity, UA >1.030 1.005 - 1.030    Blood, Urine MODERATE (A) Negative    pH, UA 6.5 5.0 - 8.0    Protein, UA >=300 (A) Negative mg/dL    Urobilinogen, Urine 0.2 <2.0 E.U./dL    Nitrite, Urine Negative Negative    Leukocyte Esterase, Urine Negative Negative    Microscopic Examination YES     Urine Reflex to Culture Yes     Urine Type Not Specified    Lactic Acid, Plasma   Result Value Ref Range    Lactic Acid 1.4 0.4 - 2.0 mmol/L   Microscopic Urinalysis   Result Value Ref Range    Hyaline Casts, UA 5 0 - 8 /LPF    WBC, UA 18 (H) 0 - 5 /HPF    RBC, UA 12 (H) 0 - 4 /HPF    Epi Cells 2 0 - 5 /HPF       I spoke with Dr. Li Hinds. We thoroughly discussed the history, physical exam, laboratory and imaging studies, as well as, emergency department course. Based upon that discussion, we've decided to admit Kamran Rod for further observation and evaluation of Honey Castleman Nalls's abdominal pain. As I have deemed necessary from their history, physical and studies, I have considered and evaluated Kamran Rod for the following diagnoses:  ACUTE APPENDICITIS, BOWEL OBSTRUCTION, CHOLECYSTITIS, DIVERTICULITIS, INCARCERATED HERNIA, PANCREATITIS, or PERFORATED BOWEL or ULCER. The patient tolerated their visit well. I evaluated the patient. The physician was available for consultation as needed. The patient and / or the family were informed of the results of anytests, a time was given to answer questions, a plan was proposed and they agreed with plan. CLINICAL IMPRESSION:  1. Ureterolithiasis    2. Sepsis, due to unspecified organism [facility])        DISPOSITION Decision To Admit 06/18/2019 03:40:52 PM      PATIENT REFERRED TO:  No follow-up provider specified.     DISCHARGE MEDICATIONS:  New Prescriptions    No medications on file       DISCONTINUED MEDICATIONS:  Discontinued Medications    No medications on file              (Please note the MDM and HPI sections of this note were completed with a voice recognition program.  Efforts weremade to edit the dictations but occasionally words are mis-transcribed.)    Electronically signed, [facility] [place],           [place], [place]  06/18/19 5361

## 2019-06-18 NOTE — ED NOTES
Pt attempt to provide UA multiple times, requesting to stay in bed each time. Bladder scan completed-308mL. RN assisted pt to standing position and pt was able to give sample. 275mL output.  Post residual void 23mL     Ovidio Land RN  06/18/19 [address], RN  06/18/19 7824

## 2019-06-18 NOTE — BRIEF OP NOTE
Brief Postoperative Note  ______________________________________________________________    Patient: Pernell Henderson  YOB: 1955  MRN: [id number]  Date of Procedure: 6/18/2019    Pre-Op Diagnosis: LEFT URETHRAL STENT    Post-Op Diagnosis: Same       Procedure(s):  CYSTOSCOPY LEFT URETERAL STENT INSERTION    Anesthesia: TIVA    Surgeon(s):  Jesus Jacinto MD    Assistant: none    Estimated Blood Loss (mL): less than 50     Complications: None    Specimens:   * No specimens in log *    Implants:  Implant Name Type Inv.  Item Serial No.  Lot No. LRB No. Used   Tomas Jak W/O GW [id number] Stent:Urological STENT Clara Maffucci W/O GW [id number]  [place] SCI: Candice Marrero [id number] Left 1         Drains:   Urethral Catheter Coude;Straight-tip;Latex 18 fr (Active)       Findings: 9mm left UPJ stone with fever, enlarged prostate, h/o retention    Ariana Escobar MD  Date: 6/18/2019  Time: 5:45 PM

## 2019-06-18 NOTE — PROGRESS NOTES
Patient arrived to room 5273 from PACU, sleepy in bed, but easily arousable. Asking for something to drink. Vitals stable, denies pain. Kingsley catheter remains in place below the bladder draining light red urine. Family at bedside. Oriented to room, call light within reach. Bed alarm active. Will continue to monitor.

## 2019-06-18 NOTE — H&P
Hospital Medicine   History and Physical    Patient:  Mikey Murray                                               :1955   . MRN: [id number]  Date of admission: 2019  Date of Service: Pt seen/examined on 2019 and Admitted to Inpatient     CHIEF COMPLAINT:  Left flank pain    History Obtained From:  patient  PCP: Florinda Benites MD    HISTORY OF PRESENT ILLNESS:   Patient is a 47-year-old  male with past medical history significant for hypertension, hyperlipidemia and diabetes mellitus type 2 who was brought to the emergency room for evaluation due to left flank pain and altered mental status. History was partially obtained from spouse who was at bedside. Patient had developed left flank pain starting approximately 4 to 5 days prior to hospital visit. Pain was radiating towards the groin. Associated symptoms include some chills, fever and confusion with decision made to seek help. Work-up on presentation to the emergency room includes vital signs that showed fever with temperature of 101.6 degrees. Heart rate was 130. Labs showed leukocytosis as WBC was 18,300. Urinalysis was concerning for infection. Subsequent CT abdomen/pelvis showed 7 mm stone at left UVJ causing mild hydronephrosis. Also noted was separate 9 mm stone in the urinary bladder. Due to concern for left ureterolithiasis and severe sepsis from complicated urinary tract infection, patient is being admitted to the hospital for further work-up and management. Past Medical History:        Diagnosis Date    Diabetes mellitus ([address]     High blood pressure        Past Surgical History:        Procedure Laterality Date    APPENDECTOMY  1984       Medications Prior to Admission:    Prior to Admission medications    Medication Sig Start Date End Date Taking?  Authorizing Provider   amLODIPine (NORVASC) 10 MG tablet Take 1 tablet by mouth daily 19  Yes Florinda Benites MD   lovastatin (MEVACOR) 20 MG tablet Take 1 tablet by mouth nightly 6/6/19  Yes Fazal Hendricks MD   Cholecalciferol (VITAMIN D3) 2000 units CAPS Take 1 capsule by mouth daily 6/6/19  Yes Fazal Hendricks MD   glipiZIDE (GLUCOTROL) 5 MG tablet Take 1 tablet by mouth every evening 6/6/19  Yes Fazal Hendricks MD   omeprazole (PRILOSEC) 20 MG delayed release capsule Take 1 capsule by mouth daily 6/6/19  Yes Fazal Hendricks MD   tamsulosin [facility]) 0.4 MG capsule Take 1 capsule by mouth daily 6/6/19  Yes Fazal Hendricks MD   ibuprofen (ADVIL;MOTRIN) 800 MG tablet Take 1 tablet by mouth as needed for Pain 6/6/19  Yes Fazal Hendricks MD   Omega-3 Fatty Acids (FISH OIL) 1000 MG CAPS Take 1,000 mg by mouth daily   Yes Historical Provider, MD   Ascorbic Acid (VITAMIN C) 1000 MG tablet Take 1,000 mg by mouth daily   Yes Historical Provider, MD   Multiple Vitamins-Minerals (THERAPEUTIC MULTIVITAMIN-MINERALS) tablet Take 1 tablet by mouth daily   Yes Historical Provider, MD   sitaGLIPtan-metformin (JANUMET)  MG per tablet Take 1 tablet by mouth 2 times daily (with meals). Yes Historical Provider, MD   lisinopril (PRINIVIL;ZESTRIL) 20 MG tablet Take 30 mg by mouth daily    Yes Historical Provider, MD       Allergies:  Patient has no known allergies. Social History:   TOBACCO:   reports that he has never smoked. He has never used smokeless tobacco.  ETOH:   reports that he does not drink alcohol. OCCUPATION:      Family History:       Problem Relation Age of Onset    Kidney Disease Mother     Diabetes Mother     Cancer Brother        REVIEW OF SYSTEMS:  Pertinent positives as noted in HPI. All others systems were reviewed and are negative.       Physical Exam:    Vitals: BP (!) 155/78   Pulse 146   Temp 101.6 °F (38.7 °C) (Oral)   Resp 23   Ht 5' 7\" (1.702 m)   Wt 171 lb 8.3 oz (77.8 kg)   SpO2 92%   BMI 26.86 kg/m²   General appearance: Laying in bed  Skin: Warm  HEENT: Head: Normocephalic, no lesions, without obvious abnormality.   Neck: no adenopathy, no carotid bruit, no JVD, supple, symmetrical, trachea midline and thyroid not enlarged, symmetric, no tenderness/mass/nodules  Lungs: diminished breath sounds bilaterally  Heart: regular rate and rhythm  Abdomen: Left flank tenderness   Extremities: extremities normal, atraumatic, no cyanosis or edema  Neurologic: Mental status: Alert, oriented, thought content appropriate      Results for orders placed or performed during the hospital encounter of 06/18/19   CBC Auto Differential   Result Value Ref Range    WBC 18.3 (H) 4.0 - 11.0 K/uL    RBC 4.61 4.20 - 5.90 M/uL    Hemoglobin 13.3 (L) 13.5 - 17.5 g/dL    Hematocrit 39.5 (L) 40.5 - 52.5 %    MCV 85.8 80.0 - 100.0 fL    MCH 28.9 26.0 - 34.0 pg    MCHC 33.7 31.0 - 36.0 g/dL    RDW 14.9 12.4 - 15.4 %    Platelets 464 319 - 201 K/uL    MPV 7.0 5.0 - 10.5 fL    Neutrophils % 94.7 %    Lymphocytes % 2.3 %    Monocytes % 2.6 %    Eosinophils % 0.2 %    Basophils % 0.2 %    Neutrophils # 17.3 (H) 1.7 - 7.7 K/uL    Lymphocytes # 0.4 (L) 1.0 - 5.1 K/uL    Monocytes # 0.5 0.0 - 1.3 K/uL    Eosinophils # 0.0 0.0 - 0.6 K/uL    Basophils # 0.0 0.0 - 0.2 K/uL   Comprehensive Metabolic Panel w/ Reflex to MG   Result Value Ref Range    Sodium 139 136 - 145 mmol/L    Potassium reflex Magnesium 4.0 3.5 - 5.1 mmol/L    Chloride 99 99 - 110 mmol/L    CO2 27 21 - 32 mmol/L    Anion Gap 13 3 - 16    Glucose 183 (H) 70 - 99 mg/dL    BUN 16 7 - 20 mg/dL    CREATININE 1.1 0.8 - 1.3 mg/dL    GFR Non-African American >60 >60    GFR African American >60 >60    Calcium 10.1 8.3 - 10.6 mg/dL    Total Protein 7.9 6.4 - 8.2 g/dL    Alb 4.2 3.4 - 5.0 g/dL    Albumin/Globulin Ratio 1.1 1.1 - 2.2    Total Bilirubin 0.8 0.0 - 1.0 mg/dL    Alkaline Phosphatase 68 40 - 129 U/L    ALT 8 (L) 10 - 40 U/L    AST 13 (L) 15 - 37 U/L    Globulin 3.7 g/dL   Urinalysis Reflex to Culture   Result Value Ref Range    Color, UA YELLOW Straw/Yellow    Clarity, UA Clear Clear Glucose, Ur Negative Negative mg/dL    Bilirubin Urine Negative Negative    Ketones, Urine Negative Negative mg/dL    Specific Gravity, UA >1.030 1.005 - 1.030    Blood, Urine MODERATE (A) Negative    pH, UA 6.5 5.0 - 8.0    Protein, UA >=300 (A) Negative mg/dL    Urobilinogen, Urine 0.2 <2.0 E.U./dL    Nitrite, Urine Negative Negative    Leukocyte Esterase, Urine Negative Negative    Microscopic Examination YES     Urine Reflex to Culture Yes     Urine Type Not Specified    Lactic Acid, Plasma   Result Value Ref Range    Lactic Acid 1.4 0.4 - 2.0 mmol/L   Microscopic Urinalysis   Result Value Ref Range    Urinalysis Comments see below     Hyaline Casts, UA 5 0 - 8 /LPF    WBC, UA 18 (H) 0 - 5 /HPF    RBC, UA 12 (H) 0 - 4 /HPF    Epi Cells 2 0 - 5 /HPF     -----------------------------------------------------------------  Imaging  Us Scrotum And Testicles    Result Date: 6/18/2019  EXAMINATION: ULTRASOUND OF THE SCROTUM/TESTICLES WITH COLOR DOPPLER FLOW EVALUATION 6/18/2019 COMPARISON: None. HISTORY: ORDERING SYSTEM PROVIDED HISTORY: testicular pain x 4-5 days, fever FINDINGS: Measurements: Right testicle: 3.6 x 3.3 x 2.5 cm. Left testicle: 4.4 x 3.1 x 2.7 cm Right: Grey scale: The right testicle demonstrates normal homogeneous echotexture without focal lesion. No evidence of testicular microlithiasis. Doppler Evaluation:  There is normal arterial and venous Doppler flow within the testicle. Scrotal Sac:  Small hydrocele. Epididymis:  No acute abnormality. Left: Grey scale: The left testicle demonstrates normal homogeneous echotexture without focal lesion. No evidence of testicular microlithiasis. Doppler Evaluation:  There is normal arterial and venous Doppler flow within the testicle. Scrotal Sac: Moderate sized hydrocele Epididymis:  No acute abnormality. Small right-sided and moderate left-sided hydrocele.  No evidence of torsion, orchitis or epididymitis     Ct Abdomen Pelvis W Iv Contrast Additional Contrast? None    Result Date: 6/18/2019  EXAMINATION: CT OF THE ABDOMEN AND PELVIS WITH CONTRAST 6/18/2019 2:58 pm TECHNIQUE: CT of the abdomen and pelvis was performed with the administration of intravenous contrast. Multiplanar reformatted images are provided for review. Dose modulation, iterative reconstruction, and/or weight based adjustment of the mA/kV was utilized to reduce the radiation dose to as low as reasonably achievable. COMPARISON: CT abdomen and pelvis September 22, 2009 HISTORY: ORDERING SYSTEM PROVIDED HISTORY: lower abd pain radiating to scrotum (scrotal US normal), fever,tachycardic TECHNOLOGIST PROVIDED HISTORY: If patient is on cardiac monitor and/or pulse ox, they may be taken off cardiac monitor and pulse ox, left on O2 if currently on. All monitors reattached when patient returns to room. Additional Contrast?->None Ordering Physician Provided Reason for Exam: Abdominal Pain Acuity: Acute Type of Exam: Initial FINDINGS: Lower Chest: No evidence of pneumonia or other acute findings. Organs: There is a left-sided stone at the UPJ measuring 7 mm. There is mild left-sided hydronephrosis. Small stones elsewhere in the kidneys measuring 4 mm and less in size. No evidence of pyelonephritis or cholecystitis. A small 4 mm gallstone is noted. Subcentimeter low-density lesions in the liver are too small to accurately characterize but most likely cysts or hemangiomata. There are multiple cysts in the liver. Higher density lesions measuring up to 2.7 cm are indeterminate but may be high-density cysts. GI/Bowel: No bowel obstruction or other acute process demonstrated. No evidence of colitis, diverticulitis or appendicitis. There is scattered colonic diverticulosis. Pelvis: In the lumen of the bladder there is a stone measuring 9 mm. Prostate gland is quite enlarged, mildly increased in size from the remote comparison. This causes impression upon the base the bladder.   Normal distention of the bladder. Peritoneum/Retroperitoneum: No free air, free fluid or abscess. Small fat containing umbilical hernia. Bones/Soft Tissues: No fracture or other acute osseous process. Advanced degenerative disc disease changes at L4-5 and L5-S1     7 mm stone at the left UVJ causing mild hydronephrosis. There is a separate renal 9 mm stone in the lumen of the bladder. Other nonacute findings as discussed       EKG: Pending     Assessment/Plan:   Patient Active Problem List:      Left ureterolithiasis: Reason for sepsis. Noted to be 7 mm at the left UVJ junction causing hydronephrosis. -Continue Flomax   -IV fluids   -Pain control   -Antibiotics   -Ureteroscopy today   -Urology consulted         Acute pyelonephritis: Complicated and due to ureterolithiasis. -Antibiotics   -Await urine culture        Severe sepsis: Attributed to urinary tract infection. Met criteria on presentation with fever, WBC and encephalopathy.   -Antibiotics   -IV fluid   -Trend lactic acid        Acute encephalopathy: Metabolic and due to sepsis. As per spouse, patient was confused. -Expect improvement with resolution of underlining infection   -Neurochecks   -Supportive care        Diabetes mellitus type 2: Controlled as last available hemoglobin A1c being 7.6 on 5/16/2019.   -Sliding scale insulin   -Accu-Cheks   -Plan to resume home regimen on discharge        Essential hypertension: Controlled. -Continue Norvasc   -Hold lisinopril for now   -PRN hydralazine            DVT Prophylaxis: SCD  Diet: Diet NPO Effective Now Exceptions are: Ice Chips, Sips with Meds, Popsicles  Code Status: Prior    Disposition:   1. Floor. PHYSICIANS CERTIFICATION:    I certify that Tucker Nowak is expected to be hospitalized for > than 2 midnights based on the following assessment and plan: Thank you Rikki Macias MD for the opportunity to be involved in this patient's care.  If you have any questions or concerns please feel free to contact me

## 2019-06-18 NOTE — ANESTHESIA PRE PROCEDURE
Department of Anesthesiology  Preprocedure Note       Name:  Olga Saravia   Age:  70 y.o.  :  1955                                          MRN:  [id number]         Date:  2019      Surgeon: Roberto Beach):  Rosa Galvan MD    Procedure: CYSTOSCOPY LEFT URETERAL STENT INSERTION (Left )    Medications prior to admission:   Prior to Admission medications    Medication Sig Start Date End Date Taking? Authorizing Provider   amLODIPine (NORVASC) 10 MG tablet Take 1 tablet by mouth daily 19   Johny Lou MD   lovastatin (MEVACOR) 20 MG tablet Take 1 tablet by mouth nightly 19   Johny Lou MD   Cholecalciferol (VITAMIN D3) 2000 units CAPS Take 1 capsule by mouth daily 19   Johny Lou MD   glipiZIDE (GLUCOTROL) 5 MG tablet Take 1 tablet by mouth every evening 19   Johny Lou MD   omeprazole (PRILOSEC) 20 MG delayed release capsule Take 1 capsule by mouth daily 19   Johny Lou MD   [facility]) 0.4 MG capsule Take 1 capsule by mouth daily 19   Johny Lou MD   ibuprofen (ADVIL;MOTRIN) 800 MG tablet Take 1 tablet by mouth as needed for Pain 19   Johny Lou MD   Omega-3 Fatty Acids (FISH OIL) 1000 MG CAPS Take 1,000 mg by mouth daily    Historical Provider, MD   Ascorbic Acid (VITAMIN C) 1000 MG tablet Take 1,000 mg by mouth daily    Historical Provider, MD   Multiple Vitamins-Minerals (THERAPEUTIC MULTIVITAMIN-MINERALS) tablet Take 1 tablet by mouth daily    Historical Provider, MD   sitaGLIPtan-metformin (JANUMET)  MG per tablet Take 1 tablet by mouth 2 times daily (with meals).     Historical Provider, MD   lisinopril (PRINIVIL;ZESTRIL) 20 MG tablet Take 30 mg by mouth daily     Historical Provider, MD       Current medications:    Current Facility-Administered Medications   Medication Dose Route Frequency Provider Last Rate Last Dose    0.9 % sodium chloride bolus  1,000 mL Intravenous Once [facility] 1,000 mL/hr at 06/18/19 1601 1,000 mL at 06/18/19 1601    morphine (PF) injection 4 mg  4 mg Intravenous Q30 Min PRN JANEY Reis   4 mg at 06/18/19 1602    0.9 % sodium chloride bolus  1,000 mL Intravenous Once Hernán Foster MD         Current Outpatient Medications   Medication Sig Dispense Refill    amLODIPine (NORVASC) 10 MG tablet Take 1 tablet by mouth daily 30 tablet 3    lovastatin (MEVACOR) 20 MG tablet Take 1 tablet by mouth nightly 30 tablet 3    Cholecalciferol (VITAMIN D3) 2000 units CAPS Take 1 capsule by mouth daily 30 capsule 3    glipiZIDE (GLUCOTROL) 5 MG tablet Take 1 tablet by mouth every evening 30 tablet 3    omeprazole (PRILOSEC) 20 MG delayed release capsule Take 1 capsule by mouth daily 30 capsule 3    tamsulosin (FLOMAX) 0.4 MG capsule Take 1 capsule by mouth daily 30 capsule 3    ibuprofen (ADVIL;MOTRIN) 800 MG tablet Take 1 tablet by mouth as needed for Pain 30 tablet 3    Omega-3 Fatty Acids (FISH OIL) 1000 MG CAPS Take 1,000 mg by mouth daily      Ascorbic Acid (VITAMIN C) 1000 MG tablet Take 1,000 mg by mouth daily      Multiple Vitamins-Minerals (THERAPEUTIC MULTIVITAMIN-MINERALS) tablet Take 1 tablet by mouth daily      sitaGLIPtan-metformin (JANUMET)  MG per tablet Take 1 tablet by mouth 2 times daily (with meals).       lisinopril (PRINIVIL;ZESTRIL) 20 MG tablet Take 30 mg by mouth daily          Allergies:  No Known Allergies    Problem List:    Patient Active Problem List   Diagnosis Code    Urinary retention R33.9    Diabetes mellitus ([address] E11.9    BP (high blood pressure) I10    BPH (benign prostatic hyperplasia) N40.0    Sepsis ([address] A41.9    Ureterolithiasis N20.1       Past Medical History:        Diagnosis Date    Diabetes mellitus ([address]     High blood pressure        Past Surgical History:        Procedure Laterality Date    APPENDECTOMY  01/01/1984       Social History:    Social History     Tobacco Use    Smoking status: Never Smoker    Smokeless tobacco: Never Used   Substance Use Topics    Alcohol use: No                                Counseling given: Not Answered      Vital Signs (Current):   Vitals:    06/18/19 1525 06/18/19 1542 06/18/19 1601 06/18/19 1625   BP: (!) 166/87 (!) 163/84 (!) 156/79 (!) 155/78   Pulse: 141 141 141 146   Resp: 28 (!) 31 25 23   Temp:       TempSrc:       SpO2: 95% 94% 92% 92%   Weight:       Height:                                                  BP Readings from Last 3 Encounters:   06/18/19 (!) 155/78   05/16/19 (!) 148/98   05/30/17 138/82       NPO Status:   >8hrs                                                                                BMI:   Wt Readings from Last 3 Encounters:   06/18/19 171 lb 8.3 oz (77.8 kg)   05/16/19 176 lb 8 oz (80.1 kg)   05/30/17 207 lb 7.3 oz (94.1 kg)     Body mass index is 26.86 kg/m². CBC:   Lab Results   Component Value Date    WBC 18.3 06/18/2019    RBC 4.61 06/18/2019    HGB 13.3 06/18/2019    HCT 39.5 06/18/2019    MCV 85.8 06/18/2019    RDW 14.9 06/18/2019     06/18/2019       CMP:   Lab Results   Component Value Date     06/18/2019    K 4.0 06/18/2019    CL 99 06/18/2019    CO2 27 06/18/2019    BUN 16 06/18/2019    CREATININE 1.1 06/18/2019    GFRAA >60 06/18/2019    AGRATIO 1.1 06/18/2019    LABGLOM >60 06/18/2019    GLUCOSE 183 06/18/2019    PROT 7.9 06/18/2019    CALCIUM 10.1 06/18/2019    BILITOT 0.8 06/18/2019    ALKPHOS 68 06/18/2019    AST 13 06/18/2019    ALT 8 06/18/2019       POC Tests: No results for input(s): POCGLU, POCNA, POCK, POCCL, POCBUN, POCHEMO, POCHCT in the last 72 hours.     Coags: No results found for: PROTIME, INR, APTT    HCG (If Applicable): No results found for: PREGTESTUR, PREGSERUM, HCG, HCGQUANT     ABGs: No results found for: PHART, PO2ART, IFB0EZX, GJR7MXA, BEART, Y2GTTFEN     Type & Screen (If Applicable):  No results found for: LABABO, [address]    Anesthesia Evaluation  Patient summary reviewed no history of anesthetic

## 2019-06-18 NOTE — CONSULTS
Consulting Physician: JANEY Farah    Reason for Consult: Ureteral and bladder stone    History of Present Illness: Corey Osman is a 70 y.o.  male who started having left abdominal pain and left testicular pain about 5 days ago. He has been having urgency, frequency, dysuria and a constant headache. Denies gross hematuria, fever, chills, nausea or vomiting. Temperature on admission 101.6, . CT scan shows 7mm left UVJ stone with mild hydro and a 9mm bladder stone. Scrotal ultrasound with small right hydrocele and moderate left hydrocele. He has required stents in the past for kidney stones.      Past Medical History:   Past Medical History:   Diagnosis Date    Diabetes mellitus ([facility]     High blood pressure        Past Surgical History:  Past Surgical History:   Procedure Laterality Date    APPENDECTOMY  01/01/1984       Social History:  Social History     Socioeconomic History    Marital status:      Spouse name: Not on file    Number of children: Not on file    Years of education: Not on file    Highest education level: Not on file   Occupational History    Not on file   Social Needs    Financial resource strain: Not on file    Food insecurity:     Worry: Not on file     Inability: Not on file    Transportation needs:     Medical: Not on file     Non-medical: Not on file   Tobacco Use    Smoking status: Never Smoker    Smokeless tobacco: Never Used   Substance and Sexual Activity    Alcohol use: No    Drug use: No    Sexual activity: Not on file   Lifestyle    Physical activity:     Days per week: Not on file     Minutes per session: Not on file    Stress: Not on file   Relationships    Social connections:     Talks on phone: Not on file     Gets together: Not on file     Attends [employer] service: Not on file     Active member of club or organization: Not on file     Attends meetings of clubs or organizations: Not on file     Relationship status: Not on file   [facility] 06/18/2019    MCHC 33.7 06/18/2019    RDW 14.9 06/18/2019     06/18/2019    MPV 7.0 06/18/2019     BMP   Lab Results   Component Value Date     06/18/2019    K 4.0 06/18/2019    CL 99 06/18/2019    CO2 27 06/18/2019    BUN 16 06/18/2019    CREATININE 1.1 06/18/2019    GLUCOSE 183 06/18/2019    CALCIUM 10.1 06/18/2019       Urinalysis:   Lab Results   Component Value Date    COLORU YELLOW 06/18/2019    GLUCOSEU Negative 06/18/2019    BLOODU MODERATE 06/18/2019    NITRU Negative 06/18/2019    LEUKOCYTESUR Negative 06/18/2019       Imaging:  Pertinent images and radiologist's report were reviewed independently  Scrotal ultrasound 6/18/19  Impression   Small right-sided and moderate left-sided hydrocele.       No evidence of torsion, orchitis or epididymitis     Abdomen/Pelvis CT scan 6/18/19  Impression   7 mm stone at the left UVJ causing mild hydronephrosis. Josselin Chikis is a separate   renal 9 mm stone in the lumen of the bladder.       Other nonacute findings as discussed     Impression/Plan:   - 63y.o. Male with left abdominal pain, left testicular pain. CT scan with 7mm stone at UVJ with mild hydro, separate 9mm stone in bladder. Sepsis. - Cystoscopy with left ureteral stent placement today with Dr. Adama Lynch blood and urine cultures- on Cefepime. - Definitive stone treatment 1-2 weeks.      SERGIO Hawley - CNP 3/78/77406:84 PM

## 2019-06-19 ENCOUNTER — APPOINTMENT (OUTPATIENT)
Dept: GENERAL RADIOLOGY | Age: 64
DRG: 853 | End: 2019-06-19
Payer: MEDICARE

## 2019-06-19 LAB
ANION GAP SERPL CALCULATED.3IONS-SCNC: 14 MMOL/L (ref 3–16)
BASOPHILS ABSOLUTE: 0.1 K/UL (ref 0–0.2)
BASOPHILS RELATIVE PERCENT: 0.2 %
BUN BLDV-MCNC: 14 MG/DL (ref 7–20)
CALCIUM SERPL-MCNC: 8.5 MG/DL (ref 8.3–10.6)
CHLORIDE BLD-SCNC: 103 MMOL/L (ref 99–110)
CO2: 23 MMOL/L (ref 21–32)
CREAT SERPL-MCNC: 1.2 MG/DL (ref 0.8–1.3)
EOSINOPHILS ABSOLUTE: 0 K/UL (ref 0–0.6)
EOSINOPHILS RELATIVE PERCENT: 0 %
GFR AFRICAN AMERICAN: >60
GFR NON-AFRICAN AMERICAN: >60
GLUCOSE BLD-MCNC: 126 MG/DL (ref 70–99)
GLUCOSE BLD-MCNC: 146 MG/DL (ref 70–99)
GLUCOSE BLD-MCNC: 161 MG/DL (ref 70–99)
GLUCOSE BLD-MCNC: 168 MG/DL (ref 70–99)
GLUCOSE BLD-MCNC: 200 MG/DL (ref 70–99)
GLUCOSE BLD-MCNC: 220 MG/DL (ref 70–99)
HCT VFR BLD CALC: 34.2 % (ref 40.5–52.5)
HEMOGLOBIN: 11.6 G/DL (ref 13.5–17.5)
LYMPHOCYTES ABSOLUTE: 0.5 K/UL (ref 1–5.1)
LYMPHOCYTES RELATIVE PERCENT: 2.3 %
MAGNESIUM: 1.2 MG/DL (ref 1.8–2.4)
MCH RBC QN AUTO: 28.9 PG (ref 26–34)
MCHC RBC AUTO-ENTMCNC: 33.9 G/DL (ref 31–36)
MCV RBC AUTO: 85.3 FL (ref 80–100)
MONOCYTES ABSOLUTE: 1.7 K/UL (ref 0–1.3)
MONOCYTES RELATIVE PERCENT: 7.1 %
NEUTROPHILS ABSOLUTE: 21.3 K/UL (ref 1.7–7.7)
NEUTROPHILS RELATIVE PERCENT: 90.4 %
PDW BLD-RTO: 14.9 % (ref 12.4–15.4)
PERFORMED ON: ABNORMAL
PLATELET # BLD: 182 K/UL (ref 135–450)
PMV BLD AUTO: 7.4 FL (ref 5–10.5)
POTASSIUM REFLEX MAGNESIUM: 3.3 MMOL/L (ref 3.5–5.1)
RBC # BLD: 4.01 M/UL (ref 4.2–5.9)
SODIUM BLD-SCNC: 140 MMOL/L (ref 136–145)
WBC # BLD: 23.6 K/UL (ref 4–11)

## 2019-06-19 PROCEDURE — 1200000000 HC SEMI PRIVATE

## 2019-06-19 PROCEDURE — 6370000000 HC RX 637 (ALT 250 FOR IP): Performed by: INTERNAL MEDICINE

## 2019-06-19 PROCEDURE — 6360000002 HC RX W HCPCS: Performed by: INTERNAL MEDICINE

## 2019-06-19 PROCEDURE — 83735 ASSAY OF MAGNESIUM: CPT

## 2019-06-19 PROCEDURE — 36415 COLL VENOUS BLD VENIPUNCTURE: CPT

## 2019-06-19 PROCEDURE — 74018 RADEX ABDOMEN 1 VIEW: CPT

## 2019-06-19 PROCEDURE — 80048 BASIC METABOLIC PNL TOTAL CA: CPT

## 2019-06-19 PROCEDURE — 2580000003 HC RX 258: Performed by: INTERNAL MEDICINE

## 2019-06-19 PROCEDURE — 94760 N-INVAS EAR/PLS OXIMETRY 1: CPT

## 2019-06-19 PROCEDURE — 85025 COMPLETE CBC W/AUTO DIFF WBC: CPT

## 2019-06-19 RX ORDER — PHENAZOPYRIDINE HYDROCHLORIDE 100 MG/1
200 TABLET, FILM COATED ORAL
Status: DISPENSED | OUTPATIENT
Start: 2019-06-19 | End: 2019-06-21

## 2019-06-19 RX ADMIN — ACETAMINOPHEN 650 MG: 325 TABLET ORAL at 19:01

## 2019-06-19 RX ADMIN — ACETAMINOPHEN 650 MG: 325 TABLET ORAL at 06:03

## 2019-06-19 RX ADMIN — LOVASTATIN 20 MG: 10 TABLET ORAL at 22:00

## 2019-06-19 RX ADMIN — HYDROCODONE BITARTRATE AND ACETAMINOPHEN 1 TABLET: 5; 325 TABLET ORAL at 17:37

## 2019-06-19 RX ADMIN — AMLODIPINE BESYLATE 10 MG: 10 TABLET ORAL at 11:14

## 2019-06-19 RX ADMIN — CEFTRIAXONE 1 G: 1 INJECTION, POWDER, FOR SOLUTION INTRAMUSCULAR; INTRAVENOUS at 22:00

## 2019-06-19 RX ADMIN — OXYCODONE HYDROCHLORIDE AND ACETAMINOPHEN 1000 MG: 500 TABLET ORAL at 11:14

## 2019-06-19 RX ADMIN — SODIUM CHLORIDE: 9 INJECTION, SOLUTION INTRAVENOUS at 05:55

## 2019-06-19 RX ADMIN — PHENAZOPYRIDINE HYDROCHLORIDE 200 MG: 100 TABLET ORAL at 16:40

## 2019-06-19 RX ADMIN — HYDROCODONE BITARTRATE AND ACETAMINOPHEN 1 TABLET: 5; 325 TABLET ORAL at 05:28

## 2019-06-19 RX ADMIN — SODIUM CHLORIDE: 9 INJECTION, SOLUTION INTRAVENOUS at 17:05

## 2019-06-19 RX ADMIN — INSULIN LISPRO 2 UNITS: 100 INJECTION, SOLUTION INTRAVENOUS; SUBCUTANEOUS at 19:01

## 2019-06-19 RX ADMIN — TAMSULOSIN HYDROCHLORIDE 0.4 MG: 0.4 CAPSULE ORAL at 11:14

## 2019-06-19 RX ADMIN — PANTOPRAZOLE SODIUM 40 MG: 40 TABLET, DELAYED RELEASE ORAL at 05:28

## 2019-06-19 RX ADMIN — HYDROCODONE BITARTRATE AND ACETAMINOPHEN 2 TABLET: 5; 325 TABLET ORAL at 11:14

## 2019-06-19 RX ADMIN — MULTIPLE VITAMINS W/ MINERALS TAB 1 TABLET: TAB at 11:14

## 2019-06-19 RX ADMIN — Medication 1 CAPSULE: at 11:15

## 2019-06-19 RX ADMIN — INSULIN LISPRO 2 UNITS: 100 INJECTION, SOLUTION INTRAVENOUS; SUBCUTANEOUS at 22:06

## 2019-06-19 RX ADMIN — Medication 10 ML: at 11:15

## 2019-06-19 ASSESSMENT — PAIN SCALES - GENERAL
PAINLEVEL_OUTOF10: 8
PAINLEVEL_OUTOF10: 0
PAINLEVEL_OUTOF10: 3
PAINLEVEL_OUTOF10: 0
PAINLEVEL_OUTOF10: 4
PAINLEVEL_OUTOF10: 5

## 2019-06-19 ASSESSMENT — PAIN DESCRIPTION - PAIN TYPE
TYPE: ACUTE PAIN

## 2019-06-19 ASSESSMENT — PAIN DESCRIPTION - ONSET
ONSET: ON-GOING
ONSET: ON-GOING
ONSET: GRADUAL
ONSET: ON-GOING

## 2019-06-19 ASSESSMENT — PAIN DESCRIPTION - LOCATION
LOCATION: SCROTUM
LOCATION: HEAD
LOCATION: SCROTUM
LOCATION: HEAD

## 2019-06-19 ASSESSMENT — PAIN DESCRIPTION - FREQUENCY
FREQUENCY: CONTINUOUS

## 2019-06-19 ASSESSMENT — PAIN DESCRIPTION - PROGRESSION
CLINICAL_PROGRESSION: GRADUALLY WORSENING
CLINICAL_PROGRESSION: NOT CHANGED

## 2019-06-19 ASSESSMENT — PAIN DESCRIPTION - ORIENTATION
ORIENTATION: LEFT
ORIENTATION: POSTERIOR
ORIENTATION: LEFT

## 2019-06-19 ASSESSMENT — PAIN SCALES - WONG BAKER: WONGBAKER_NUMERICALRESPONSE: 8

## 2019-06-19 ASSESSMENT — PAIN DESCRIPTION - DESCRIPTORS
DESCRIPTORS: ACHING
DESCRIPTORS: ACHING
DESCRIPTORS: HEADACHE
DESCRIPTORS: ACHING

## 2019-06-19 ASSESSMENT — PAIN - FUNCTIONAL ASSESSMENT
PAIN_FUNCTIONAL_ASSESSMENT: PREVENTS OR INTERFERES SOME ACTIVE ACTIVITIES AND ADLS
PAIN_FUNCTIONAL_ASSESSMENT: PREVENTS OR INTERFERES SOME ACTIVE ACTIVITIES AND ADLS
PAIN_FUNCTIONAL_ASSESSMENT: ACTIVITIES ARE NOT PREVENTED
PAIN_FUNCTIONAL_ASSESSMENT: PREVENTS OR INTERFERES SOME ACTIVE ACTIVITIES AND ADLS

## 2019-06-19 NOTE — PROGRESS NOTES
Pt Name: Ron Jacobsen  Medical Record Number: [id number]  Date of Birth 1955   Today's Date: 6/19/2019      Subjective:  Awake in bed, diaphoretic, fevers. Overall feeling better than yesterday. Left testicle still very tender. Urethral catheter intact with yellow output. ROS: Constitutional: Fever up to 102.8 this morning    Vitals:  Vitals:    06/18/19 2146 06/18/19 2338 06/19/19 0413 06/19/19 0603   BP:  127/77 (!) 152/86 (!) 141/83   Pulse:  120 134    Resp:  17 16    Temp:  99.9 °F (37.7 °C) 102.8 °F (39.3 °C)    TempSrc:  Oral Oral    SpO2:  90% 90%    Weight: 178 lb 9.2 oz (81 kg)  176 lb 9.4 oz (80.1 kg)    Height: 5' 7\" (1.702 m)        I/O last 3 completed shifts:   In: 400 [I.V.:400]  Out: 550 [Urine:550]    Exam:  General: Awake, oriented, no acute distress  Respiratory: Nonlabored breathing  Abdomen: Soft, non-tender, non-distended, no masses  : Urethral catheter intact, left testicle tender  Skin: Skin color, texture, turgor normal, no rashes or lesions  Neurologic: no gross deficits    CURRENT MEDICATIONS   Scheduled Meds:   amLODIPine  10 mg Oral Daily    lovastatin  20 mg Oral Nightly    pantoprazole  40 mg Oral QAM AC    therapeutic multivitamin-minerals  1 tablet Oral Daily    tamsulosin  0.4 mg Oral Daily    vitamin C  1,000 mg Oral Daily    cefTRIAXone (ROCEPHIN) IV  1 g Intravenous Q24H    lactobacillus  1 capsule Oral Daily with breakfast    sodium chloride flush  10 mL Intravenous 2 times per day    insulin lispro  0-12 Units Subcutaneous TID WC    insulin lispro  0-6 Units Subcutaneous Nightly    sodium chloride  1,000 mL Intravenous Once     Continuous Infusions:   sodium chloride 100 mL/hr at 06/19/19 0555    dextrose       PRN Meds:.potassium chloride **OR** potassium alternative oral replacement **OR** potassium chloride, magnesium sulfate, magnesium hydroxide, ondansetron, famotidine, nicotine, acetaminophen, HYDROcodone 5 mg - acetaminophen **OR** HYDROcodone 5 mg - acetaminophen, morphine **OR** morphine, sodium chloride flush, glucose, dextrose, glucagon (rDNA), dextrose    LABS     Recent Labs     06/18/19  1354 06/19/19  0704   WBC 18.3* 23.6*   HGB 13.3* 11.6*   HCT 39.5* 34.2*    182    140   K 4.0 3.3*   CL 99 103   CO2 27 23   BUN 16 14   CREATININE 1.1 1.2   MG  --  1.20*   CALCIUM 10.1 8.5   AST 13*  --    ALT 8*  --    BILITOT 0.8  --        ASSESSMENT   1. Hospital day # 1   2. 63y.o. Male with left abdominal pain, left testicular pain. CT scan with 7mm stone at UVJ with mild hydro, separate 9mm stone in bladder. 3. Sepsis  4. S/p left ureteral stent placement with Dr. Christie Paulino 6/18/19. 5. Previous history of urinary retention (2yrs ago), reports has been ok since taking Flomax. PLAN   1. KUB today to evaluate if eligible for ESWL   2. Awaiting blood and urine cultures - on Rocephin  3. Voiding trial tomorrow- continue Flomax  4. Definitive stone treatment in 2 weeks.       SERGIO Molina - CNP 6/19/2019 9:22 AM

## 2019-06-19 NOTE — PROGRESS NOTES
MCH 28.9 26.0 - 34.0 pg    MCHC 33.7 31.0 - 36.0 g/dL    RDW 14.9 12.4 - 15.4 %    Platelets 256 586 - 344 K/uL    MPV 7.0 5.0 - 10.5 fL    Neutrophils % 94.7 %    Lymphocytes % 2.3 %    Monocytes % 2.6 %    Eosinophils % 0.2 %    Basophils % 0.2 %    Neutrophils # 17.3 (H) 1.7 - 7.7 K/uL    Lymphocytes # 0.4 (L) 1.0 - 5.1 K/uL    Monocytes # 0.5 0.0 - 1.3 K/uL    Eosinophils # 0.0 0.0 - 0.6 K/uL    Basophils # 0.0 0.0 - 0.2 K/uL   Comprehensive Metabolic Panel w/ Reflex to MG    Collection Time: 06/18/19  1:54 PM   Result Value Ref Range    Sodium 139 136 - 145 mmol/L    Potassium reflex Magnesium 4.0 3.5 - 5.1 mmol/L    Chloride 99 99 - 110 mmol/L    CO2 27 21 - 32 mmol/L    Anion Gap 13 3 - 16    Glucose 183 (H) 70 - 99 mg/dL    BUN 16 7 - 20 mg/dL    CREATININE 1.1 0.8 - 1.3 mg/dL    GFR Non-African American >60 >60    GFR African American >60 >60    Calcium 10.1 8.3 - 10.6 mg/dL    Total Protein 7.9 6.4 - 8.2 g/dL    Alb 4.2 3.4 - 5.0 g/dL    Albumin/Globulin Ratio 1.1 1.1 - 2.2    Total Bilirubin 0.8 0.0 - 1.0 mg/dL    Alkaline Phosphatase 68 40 - 129 U/L    ALT 8 (L) 10 - 40 U/L    AST 13 (L) 15 - 37 U/L    Globulin 3.7 g/dL   Lactic Acid, Plasma    Collection Time: 06/18/19  1:54 PM   Result Value Ref Range    Lactic Acid 1.4 0.4 - 2.0 mmol/L   Urinalysis Reflex to Culture    Collection Time: 06/18/19  3:27 PM   Result Value Ref Range    Color, UA YELLOW Straw/Yellow    Clarity, UA Clear Clear    Glucose, Ur Negative Negative mg/dL    Bilirubin Urine Negative Negative    Ketones, Urine Negative Negative mg/dL    Specific Gravity, UA >1.030 1.005 - 1.030    Blood, Urine MODERATE (A) Negative    pH, UA 6.5 5.0 - 8.0    Protein, UA >=300 (A) Negative mg/dL    Urobilinogen, Urine 0.2 <2.0 E.U./dL    Nitrite, Urine Negative Negative    Leukocyte Esterase, Urine Negative Negative    Microscopic Examination YES     Urine Reflex to Culture Yes     Urine Type Not Specified    Microscopic Urinalysis    Collection Time: 06/18/19  3:27 PM   Result Value Ref Range    Urinalysis Comments see below     Hyaline Casts, UA 5 0 - 8 /LPF    WBC, UA 18 (H) 0 - 5 /HPF    RBC, UA 12 (H) 0 - 4 /HPF    Epi Cells 2 0 - 5 /HPF   POCT Glucose    Collection Time: 06/18/19  5:55 PM   Result Value Ref Range    POC Glucose 128 (H) 70 - 99 mg/dl    Performed on ACCU-CHEK    Lactic Acid, Plasma    Collection Time: 06/18/19  7:41 PM   Result Value Ref Range    Lactic Acid 1.2 0.4 - 2.0 mmol/L   POCT Glucose    Collection Time: 06/18/19  8:59 PM   Result Value Ref Range    POC Glucose 134 (H) 70 - 99 mg/dl    Performed on ACCU-CHEK    Lactate, Sepsis    Collection Time: 06/18/19  9:43 PM   Result Value Ref Range    Lactic Acid, Sepsis 1.2 0.4 - 1.9 mmol/L   Basic Metabolic Panel w/ Reflex to MG    Collection Time: 06/19/19  7:04 AM   Result Value Ref Range    Sodium 140 136 - 145 mmol/L    Potassium reflex Magnesium 3.3 (L) 3.5 - 5.1 mmol/L    Chloride 103 99 - 110 mmol/L    CO2 23 21 - 32 mmol/L    Anion Gap 14 3 - 16    Glucose 161 (H) 70 - 99 mg/dL    BUN 14 7 - 20 mg/dL    CREATININE 1.2 0.8 - 1.3 mg/dL    GFR Non-African American >60 >60    GFR African American >60 >60    Calcium 8.5 8.3 - 10.6 mg/dL   Magnesium    Collection Time: 06/19/19  7:04 AM   Result Value Ref Range    Magnesium 1.20 (L) 1.80 - 2.40 mg/dL   CBC auto differential    Collection Time: 06/19/19  7:04 AM   Result Value Ref Range    WBC 23.6 (H) 4.0 - 11.0 K/uL    RBC 4.01 (L) 4.20 - 5.90 M/uL    Hemoglobin 11.6 (L) 13.5 - 17.5 g/dL    Hematocrit 34.2 (L) 40.5 - 52.5 %    MCV 85.3 80.0 - 100.0 fL    MCH 28.9 26.0 - 34.0 pg    MCHC 33.9 31.0 - 36.0 g/dL    RDW 14.9 12.4 - 15.4 %    Platelets 031 730 - 003 K/uL    MPV 7.4 5.0 - 10.5 fL    Neutrophils % 90.4 %    Lymphocytes % 2.3 %    Monocytes % 7.1 %    Eosinophils % 0.0 %    Basophils % 0.2 %    Neutrophils # 21.3 (H) 1.7 - 7.7 K/uL    Lymphocytes # 0.5 (L) 1.0 - 5.1 K/uL    Monocytes # 1.7 (H) 0.0 - 1.3 K/uL    Eosinophils # 0.0 0.0 - 0.6 K/uL    Basophils # 0.1 0.0 - 0.2 K/uL   POCT Glucose    Collection Time: 06/19/19  7:33 AM   Result Value Ref Range    POC Glucose 146 (H) 70 - 99 mg/dl    Performed on ACCU-CHEK        -----------------------------------------------------------------  Imaging  Us Scrotum And Testicles    Result Date: 6/18/2019  EXAMINATION: ULTRASOUND OF THE SCROTUM/TESTICLES WITH COLOR DOPPLER FLOW EVALUATION 6/18/2019 COMPARISON: None. HISTORY: ORDERING SYSTEM PROVIDED HISTORY: testicular pain x 4-5 days, fever FINDINGS: Measurements: Right testicle: 3.6 x 3.3 x 2.5 cm. Left testicle: 4.4 x 3.1 x 2.7 cm Right: Grey scale: The right testicle demonstrates normal homogeneous echotexture without focal lesion. No evidence of testicular microlithiasis. Doppler Evaluation:  There is normal arterial and venous Doppler flow within the testicle. Scrotal Sac:  Small hydrocele. Epididymis:  No acute abnormality. Left: Grey scale: The left testicle demonstrates normal homogeneous echotexture without focal lesion. No evidence of testicular microlithiasis. Doppler Evaluation:  There is normal arterial and venous Doppler flow within the testicle. Scrotal Sac: Moderate sized hydrocele Epididymis:  No acute abnormality. Small right-sided and moderate left-sided hydrocele. No evidence of torsion, orchitis or epididymitis     Ct Abdomen Pelvis W Iv Contrast Additional Contrast? None    Result Date: 6/18/2019  EXAMINATION: CT OF THE ABDOMEN AND PELVIS WITH CONTRAST 6/18/2019 2:58 pm TECHNIQUE: CT of the abdomen and pelvis was performed with the administration of intravenous contrast. Multiplanar reformatted images are provided for review. Dose modulation, iterative reconstruction, and/or weight based adjustment of the mA/kV was utilized to reduce the radiation dose to as low as reasonably achievable.  COMPARISON: CT abdomen and pelvis September 22, 2009 HISTORY: ORDERING SYSTEM PROVIDED HISTORY: lower abd pain radiating to scrotum (scrotal US normal), fever,tachycardic TECHNOLOGIST PROVIDED HISTORY: If patient is on cardiac monitor and/or pulse ox, they may be taken off cardiac monitor and pulse ox, left on O2 if currently on. All monitors reattached when patient returns to room. Additional Contrast?->None Ordering Physician Provided Reason for Exam: Abdominal Pain Acuity: Acute Type of Exam: Initial FINDINGS: Lower Chest: No evidence of pneumonia or other acute findings. Organs: There is a left-sided stone at the UPJ measuring 7 mm. There is mild left-sided hydronephrosis. Small stones elsewhere in the kidneys measuring 4 mm and less in size. No evidence of pyelonephritis or cholecystitis. A small 4 mm gallstone is noted. Subcentimeter low-density lesions in the liver are too small to accurately characterize but most likely cysts or hemangiomata. There are multiple cysts in the liver. Higher density lesions measuring up to 2.7 cm are indeterminate but may be high-density cysts. GI/Bowel: No bowel obstruction or other acute process demonstrated. No evidence of colitis, diverticulitis or appendicitis. There is scattered colonic diverticulosis. Pelvis: In the lumen of the bladder there is a stone measuring 9 mm. Prostate gland is quite enlarged, mildly increased in size from the remote comparison. This causes impression upon the base the bladder. Normal distention of the bladder. Peritoneum/Retroperitoneum: No free air, free fluid or abscess. Small fat containing umbilical hernia. Bones/Soft Tissues: No fracture or other acute osseous process. Advanced degenerative disc disease changes at L4-5 and L5-S1     7 mm stone at the left UVJ causing mild hydronephrosis. There is a separate renal 9 mm stone in the lumen of the bladder.  Other nonacute findings as discussed         Objective:   Vitals: BP (!) 149/84   Pulse 111   Temp 98 °F (36.7 °C) (Oral)   Resp 16   Ht 5' 7\" (1.702 m)   Wt 176 lb 9.4 oz (80.1 kg)   SpO2 93% BMI 27.66 kg/m²   General appearance: Laying in bed  Skin: Warm  HEENT: Head: Normocephalic, no lesions, without obvious abnormality. Neck: no adenopathy, no carotid bruit, no JVD, supple, symmetrical, trachea midline and thyroid not enlarged, symmetric, no tenderness/mass/nodules  Lungs: diminished breath sounds bilaterally  Heart: regular rate and rhythm  Abdomen: soft, non-tender; bowel sounds normal; no masses,  no organomegaly  Extremities: extremities normal, atraumatic, no cyanosis or edema  Neurologic: Mental status: Alert, oriented, thought content appropriate      Assessment/Plan:   Patient Active Problem List:       Left ureterolithiasis: Reason for sepsis. Noted to be 7 mm at the left UVJ junction causing hydronephrosis. -s/p CYSTOSCOPY LEFT URETERAL STENT INSERTION on 6/18/19   -IV fluids   -Pain control   -Antibiotics   -Urology consulted         Acute pyelonephritis: Complicated and due to ureterolithiasis. -Antibiotics   -Await urine culture        Severe sepsis: Attributed to urinary tract infection. Met criteria on presentation with fever, WBC and encephalopathy.   -Antibiotics   -IV fluid        Acute encephalopathy: Improved. Metabolic and due to sepsis. As per spouse, patient was confused. -Expect improvement with resolution of underlining infection   -Neurochecks   -Supportive care        Diabetes mellitus type 2: Controlled as last available hemoglobin A1c being 7.6 on 5/16/2019.   -Sliding scale insulin   -Accu-Cheks   -Plan to resume home regimen on discharge        Essential hypertension: Controlled.    -Continue Norvasc   -Hold lisinopril for now   -PRN hydralazine        Disposition:   1. D    Bang Mckeon MD, [address]  6/19/2019  10:57 AM  [phone]

## 2019-06-19 NOTE — PROGRESS NOTES
Pt returned from x-ray, SpO2 93 RA, , this RN discussed importance of pt getting out of bed today and moving around room to improve bowel and lung function after surgery; pt and spouse verbalized understanding

## 2019-06-19 NOTE — PLAN OF CARE
Problem: Pain:  Goal: Pain level will decrease  Description  Pain level will decrease  Outcome: Ongoing  Goal: Control of acute pain  Description  Control of acute pain  Outcome: Ongoing  Goal: Control of chronic pain  Description  Control of chronic pain  Outcome: Ongoing     Problem: SAFETY  Goal: Free from accidental physical injury  Outcome: Ongoing     Problem: DAILY CARE  Goal: Daily care needs are met  Outcome: Ongoing     Problem: PAIN  Goal: Patient's pain/discomfort is manageable  Outcome: Ongoing     Problem: SKIN INTEGRITY  Goal: Skin integrity is maintained or improved  Outcome: Ongoing     Problem: KNOWLEDGE DEFICIT  Goal: Patient/S.O. demonstrates understanding of disease process, treatment plan, medications, and discharge instructions.   Outcome: Ongoing     Problem: DISCHARGE BARRIERS  Goal: Patient's continuum of care needs are met  Outcome: Ongoing

## 2019-06-19 NOTE — PROGRESS NOTES
Pt was transferred from PACU to [facility], monitor applied. Oriented to room, call light, hosp policy. Pt expressed understanding, pain controlled with PRN meds.

## 2019-06-19 NOTE — PROGRESS NOTES
D: pt spiked another fever of 102.3 A: this RN is giving tylenol and sent secure message to Dr. Pratima Stevens R: will continue to monitor pt

## 2019-06-20 LAB
ANION GAP SERPL CALCULATED.3IONS-SCNC: 11 MMOL/L (ref 3–16)
BASOPHILS ABSOLUTE: 0.1 K/UL (ref 0–0.2)
BASOPHILS RELATIVE PERCENT: 0.2 %
BUN BLDV-MCNC: 14 MG/DL (ref 7–20)
CALCIUM SERPL-MCNC: 8.5 MG/DL (ref 8.3–10.6)
CHLORIDE BLD-SCNC: 105 MMOL/L (ref 99–110)
CO2: 25 MMOL/L (ref 21–32)
CREAT SERPL-MCNC: 1 MG/DL (ref 0.8–1.3)
EOSINOPHILS ABSOLUTE: 0.1 K/UL (ref 0–0.6)
EOSINOPHILS RELATIVE PERCENT: 0.2 %
GFR AFRICAN AMERICAN: >60
GFR NON-AFRICAN AMERICAN: >60
GLUCOSE BLD-MCNC: 143 MG/DL (ref 70–99)
GLUCOSE BLD-MCNC: 153 MG/DL (ref 70–99)
GLUCOSE BLD-MCNC: 196 MG/DL (ref 70–99)
GLUCOSE BLD-MCNC: 217 MG/DL (ref 70–99)
GLUCOSE BLD-MCNC: 262 MG/DL (ref 70–99)
HCT VFR BLD CALC: 33.9 % (ref 40.5–52.5)
HEMOGLOBIN: 11.1 G/DL (ref 13.5–17.5)
LYMPHOCYTES ABSOLUTE: 1 K/UL (ref 1–5.1)
LYMPHOCYTES RELATIVE PERCENT: 4.4 %
MAGNESIUM: 1.7 MG/DL (ref 1.8–2.4)
MCH RBC QN AUTO: 27.8 PG (ref 26–34)
MCHC RBC AUTO-ENTMCNC: 32.7 G/DL (ref 31–36)
MCV RBC AUTO: 84.9 FL (ref 80–100)
MONOCYTES ABSOLUTE: 1.2 K/UL (ref 0–1.3)
MONOCYTES RELATIVE PERCENT: 5.7 %
NEUTROPHILS ABSOLUTE: 19.5 K/UL (ref 1.7–7.7)
NEUTROPHILS RELATIVE PERCENT: 89.5 %
ORGANISM: ABNORMAL
PDW BLD-RTO: 15.2 % (ref 12.4–15.4)
PERFORMED ON: ABNORMAL
PLATELET # BLD: 179 K/UL (ref 135–450)
PMV BLD AUTO: 7.3 FL (ref 5–10.5)
POTASSIUM REFLEX MAGNESIUM: 3.5 MMOL/L (ref 3.5–5.1)
RBC # BLD: 3.99 M/UL (ref 4.2–5.9)
SODIUM BLD-SCNC: 141 MMOL/L (ref 136–145)
URINE CULTURE, ROUTINE: ABNORMAL
URINE CULTURE, ROUTINE: ABNORMAL
WBC # BLD: 21.8 K/UL (ref 4–11)

## 2019-06-20 PROCEDURE — 85025 COMPLETE CBC W/AUTO DIFF WBC: CPT

## 2019-06-20 PROCEDURE — 6360000002 HC RX W HCPCS: Performed by: INTERNAL MEDICINE

## 2019-06-20 PROCEDURE — 94760 N-INVAS EAR/PLS OXIMETRY 1: CPT

## 2019-06-20 PROCEDURE — 6370000000 HC RX 637 (ALT 250 FOR IP): Performed by: INTERNAL MEDICINE

## 2019-06-20 PROCEDURE — 2580000003 HC RX 258: Performed by: INTERNAL MEDICINE

## 2019-06-20 PROCEDURE — 83735 ASSAY OF MAGNESIUM: CPT

## 2019-06-20 PROCEDURE — 80048 BASIC METABOLIC PNL TOTAL CA: CPT

## 2019-06-20 PROCEDURE — 1200000000 HC SEMI PRIVATE

## 2019-06-20 PROCEDURE — 36415 COLL VENOUS BLD VENIPUNCTURE: CPT

## 2019-06-20 RX ADMIN — INSULIN LISPRO 2 UNITS: 100 INJECTION, SOLUTION INTRAVENOUS; SUBCUTANEOUS at 08:20

## 2019-06-20 RX ADMIN — HYDROCODONE BITARTRATE AND ACETAMINOPHEN 2 TABLET: 5; 325 TABLET ORAL at 21:31

## 2019-06-20 RX ADMIN — INSULIN LISPRO 2 UNITS: 100 INJECTION, SOLUTION INTRAVENOUS; SUBCUTANEOUS at 21:32

## 2019-06-20 RX ADMIN — PHENAZOPYRIDINE HYDROCHLORIDE 200 MG: 100 TABLET ORAL at 12:15

## 2019-06-20 RX ADMIN — TAMSULOSIN HYDROCHLORIDE 0.4 MG: 0.4 CAPSULE ORAL at 08:19

## 2019-06-20 RX ADMIN — ACETAMINOPHEN 650 MG: 325 TABLET ORAL at 20:25

## 2019-06-20 RX ADMIN — PANTOPRAZOLE SODIUM 40 MG: 40 TABLET, DELAYED RELEASE ORAL at 07:05

## 2019-06-20 RX ADMIN — LOVASTATIN 20 MG: 10 TABLET ORAL at 20:25

## 2019-06-20 RX ADMIN — ACETAMINOPHEN 650 MG: 325 TABLET ORAL at 03:14

## 2019-06-20 RX ADMIN — HYDROCODONE BITARTRATE AND ACETAMINOPHEN 1 TABLET: 5; 325 TABLET ORAL at 16:50

## 2019-06-20 RX ADMIN — OXYCODONE HYDROCHLORIDE AND ACETAMINOPHEN 1000 MG: 500 TABLET ORAL at 08:19

## 2019-06-20 RX ADMIN — CEFTRIAXONE 1 G: 1 INJECTION, POWDER, FOR SOLUTION INTRAMUSCULAR; INTRAVENOUS at 22:11

## 2019-06-20 RX ADMIN — HYDROCODONE BITARTRATE AND ACETAMINOPHEN 1 TABLET: 5; 325 TABLET ORAL at 10:17

## 2019-06-20 RX ADMIN — Medication 1 CAPSULE: at 08:19

## 2019-06-20 RX ADMIN — MULTIPLE VITAMINS W/ MINERALS TAB 1 TABLET: TAB at 08:19

## 2019-06-20 RX ADMIN — PHENAZOPYRIDINE HYDROCHLORIDE 200 MG: 100 TABLET ORAL at 16:29

## 2019-06-20 RX ADMIN — INSULIN LISPRO 6 UNITS: 100 INJECTION, SOLUTION INTRAVENOUS; SUBCUTANEOUS at 16:29

## 2019-06-20 RX ADMIN — Medication 10 ML: at 08:20

## 2019-06-20 RX ADMIN — PHENAZOPYRIDINE HYDROCHLORIDE 200 MG: 100 TABLET ORAL at 08:19

## 2019-06-20 RX ADMIN — AMLODIPINE BESYLATE 10 MG: 10 TABLET ORAL at 08:19

## 2019-06-20 RX ADMIN — INSULIN LISPRO 2 UNITS: 100 INJECTION, SOLUTION INTRAVENOUS; SUBCUTANEOUS at 12:15

## 2019-06-20 RX ADMIN — SODIUM CHLORIDE: 9 INJECTION, SOLUTION INTRAVENOUS at 04:02

## 2019-06-20 ASSESSMENT — PAIN DESCRIPTION - ONSET
ONSET: ON-GOING
ONSET: ON-GOING
ONSET: GRADUAL
ONSET: ON-GOING
ONSET: GRADUAL

## 2019-06-20 ASSESSMENT — PAIN SCALES - GENERAL
PAINLEVEL_OUTOF10: 8
PAINLEVEL_OUTOF10: 9
PAINLEVEL_OUTOF10: 0
PAINLEVEL_OUTOF10: 4
PAINLEVEL_OUTOF10: 0
PAINLEVEL_OUTOF10: 7
PAINLEVEL_OUTOF10: 6
PAINLEVEL_OUTOF10: 7
PAINLEVEL_OUTOF10: 3
PAINLEVEL_OUTOF10: 1
PAINLEVEL_OUTOF10: 8
PAINLEVEL_OUTOF10: 6

## 2019-06-20 ASSESSMENT — PAIN DESCRIPTION - PAIN TYPE
TYPE: ACUTE PAIN

## 2019-06-20 ASSESSMENT — PAIN DESCRIPTION - PROGRESSION
CLINICAL_PROGRESSION: GRADUALLY IMPROVING
CLINICAL_PROGRESSION: GRADUALLY IMPROVING
CLINICAL_PROGRESSION: NOT CHANGED
CLINICAL_PROGRESSION: GRADUALLY WORSENING
CLINICAL_PROGRESSION: NOT CHANGED
CLINICAL_PROGRESSION: RAPIDLY IMPROVING

## 2019-06-20 ASSESSMENT — PAIN DESCRIPTION - DESCRIPTORS
DESCRIPTORS: ACHING

## 2019-06-20 ASSESSMENT — PAIN - FUNCTIONAL ASSESSMENT
PAIN_FUNCTIONAL_ASSESSMENT: ACTIVITIES ARE NOT PREVENTED
PAIN_FUNCTIONAL_ASSESSMENT: PREVENTS OR INTERFERES SOME ACTIVE ACTIVITIES AND ADLS
PAIN_FUNCTIONAL_ASSESSMENT: ACTIVITIES ARE NOT PREVENTED
PAIN_FUNCTIONAL_ASSESSMENT: ACTIVITIES ARE NOT PREVENTED

## 2019-06-20 ASSESSMENT — PAIN DESCRIPTION - LOCATION
LOCATION: SCROTUM
LOCATION: HEAD
LOCATION: HEAD
LOCATION: SCROTUM
LOCATION: HEAD
LOCATION: HEAD
LOCATION: HEAD;FLANK
LOCATION: HEAD
LOCATION: HEAD;FLANK

## 2019-06-20 ASSESSMENT — PAIN DESCRIPTION - FREQUENCY
FREQUENCY: CONTINUOUS
FREQUENCY: OTHER (COMMENT)
FREQUENCY: CONTINUOUS

## 2019-06-20 ASSESSMENT — PAIN DESCRIPTION - ORIENTATION
ORIENTATION: POSTERIOR;LEFT
ORIENTATION: POSTERIOR
ORIENTATION: POSTERIOR;LEFT
ORIENTATION: POSTERIOR
ORIENTATION: POSTERIOR

## 2019-06-20 NOTE — PLAN OF CARE
Problem: SKIN INTEGRITY  Goal: Skin integrity is maintained or improved  6/19/2019 1935 by Jerrod David RN  Outcome: Ongoing     Problem: Falls - Risk of:  Goal: Will remain free from falls  Description Bed low,wheels locked , side rails x 2 and call light within reach.    Will remain free from falls  6/20/2019 0138 by Jesús Bazan RN  Outcome: Ongoing  6/19/2019 1935 by Jerrod David RN  Outcome: Ongoing     Problem: Falls - Risk of:  Goal: Absence of physical injury  Description  Absence of physical injury  6/20/2019 0138 by Jesús Bazan RN  Outcome: Ongoing  6/19/2019 1935 by Jerrod David RN  Outcome: Ongoing     Problem: Pain:  Goal: Pain level will decrease  Description  Pain level will decrease  6/20/2019 0138 by Jesús Bazan RN  Outcome: Ongoing  6/19/2019 1935 by Jerrod David RN  Outcome: Ongoing

## 2019-06-20 NOTE — PROGRESS NOTES
Pt Name: Kelley Weathers  Medical Record Number: [id number]  Date of Birth 1955   Today's Date: 6/20/2019      Subjective:  Awake in chair, Overall continues to feel better. Left testicle still very tender, increased swelling. Urethral catheter intact with yellow output. ROS: Constitutional: Fever up to 100.3 overnight    Vitals:  Vitals:    06/1955 06/19/19 2347 06/20/19 0413 06/20/19 0756   BP:  139/77 (!) 146/78 (!) 150/84   Pulse:  112 116 108   Resp:  16 20 18   Temp:  97.5 °F (36.4 °C) 100.3 °F (37.9 °C) 97.9 °F (36.6 °C)   TempSrc:  Oral  Oral   SpO2: 93% 90% 91% 92%   Weight:       Height:         I/O last 3 completed shifts:   In: 1530 [P.O.:360; I.V.:1120; IV Piggyback:50]  Out: 905 [Urine:775]    Exam:  General: Awake, oriented, no acute distress  Respiratory: Nonlabored breathing  Abdomen: Soft, non-tender, non-distended, no masses  : Urethral catheter intact, left testicle tender, increased swelling  Skin: Skin color, texture, turgor normal, no rashes or lesions  Neurologic: no gross deficits    CURRENT MEDICATIONS   Scheduled Meds:   phenazopyridine  200 mg Oral TID WC    amLODIPine  10 mg Oral Daily    lovastatin  20 mg Oral Nightly    pantoprazole  40 mg Oral QAM AC    therapeutic multivitamin-minerals  1 tablet Oral Daily    tamsulosin  0.4 mg Oral Daily    vitamin C  1,000 mg Oral Daily    cefTRIAXone (ROCEPHIN) IV  1 g Intravenous Q24H    lactobacillus  1 capsule Oral Daily with breakfast    sodium chloride flush  10 mL Intravenous 2 times per day    insulin lispro  0-12 Units Subcutaneous TID WC    insulin lispro  0-6 Units Subcutaneous Nightly    sodium chloride  1,000 mL Intravenous Once     Continuous Infusions:   sodium chloride 100 mL/hr at 06/20/19 0402    dextrose       PRN Meds:.potassium chloride **OR** potassium alternative oral replacement **OR** potassium chloride, magnesium sulfate, magnesium hydroxide, ondansetron, famotidine, nicotine,

## 2019-06-20 NOTE — PLAN OF CARE
Patient is alert and oriented, up with SBA, call light within reach. Fall precautions in place. AM meds complete, patient tolerated well. VSS and WDL. No s/s of distress, no further needs noted at this time.  Electronically signed by Melita Rowell RN on 6/20/2019 at 11:14 AM

## 2019-06-20 NOTE — PROGRESS NOTES
[facility]    Progress Note    Admit Date: 2019    PCP: Umesh Davies MD                  : 1955  MRN: [id number]    Subjective: Interval History: Patient seen and examined. S/p stent placement. Still with fever. Urine culture + Strep agalacticae. Needs time. Diet: DIET CARB CONTROL;      Data:   Scheduled Meds:   phenazopyridine  200 mg Oral TID WC    amLODIPine  10 mg Oral Daily    lovastatin  20 mg Oral Nightly    pantoprazole  40 mg Oral QAM AC    therapeutic multivitamin-minerals  1 tablet Oral Daily    tamsulosin  0.4 mg Oral Daily    vitamin C  1,000 mg Oral Daily    cefTRIAXone (ROCEPHIN) IV  1 g Intravenous Q24H    lactobacillus  1 capsule Oral Daily with breakfast    sodium chloride flush  10 mL Intravenous 2 times per day    insulin lispro  0-12 Units Subcutaneous TID WC    insulin lispro  0-6 Units Subcutaneous Nightly    sodium chloride  1,000 mL Intravenous Once     Continuous Infusions:   sodium chloride 100 mL/hr at 19 0402    dextrose       PRN Meds:potassium chloride **OR** potassium alternative oral replacement **OR** potassium chloride, magnesium sulfate, magnesium hydroxide, ondansetron, famotidine, nicotine, acetaminophen, HYDROcodone 5 mg - acetaminophen **OR** HYDROcodone 5 mg - acetaminophen, morphine **OR** morphine, sodium chloride flush, glucose, dextrose, glucagon (rDNA), dextrose  I/O last 3 completed shifts:   In: 7739 [P.O.:360; I.V.:1120; IV Piggyback:50]  Out: 775 [Urine:775]  I/O this shift:  In: -   Out:  [Urine:2000]    Intake/Output Summary (Last 24 hours) at 2019 1146  Last data filed at 2019 0752  Gross per 24 hour   Intake 1410 ml   Output 2775 ml   Net -1365 ml         All labs past 24 hours:   Recent Results (from the past 24 hour(s))   POCT Glucose    Collection Time: 19  3:32 PM   Result Value Ref Range    POC Glucose 126 (H) 70 - 99 mg/dl    Performed on ACCU-CHEK    POCT Glucose Collection Time: 06/19/19  6:47 PM   Result Value Ref Range    POC Glucose 168 (H) 70 - 99 mg/dl    Performed on ACCU-CHEK    POCT Glucose    Collection Time: 06/19/19  9:05 PM   Result Value Ref Range    POC Glucose 220 (H) 70 - 99 mg/dl    Performed on ACCU-CHEK    Basic Metabolic Panel w/ Reflex to MG    Collection Time: 06/20/19  6:41 AM   Result Value Ref Range    Sodium 141 136 - 145 mmol/L    Potassium reflex Magnesium 3.5 3.5 - 5.1 mmol/L    Chloride 105 99 - 110 mmol/L    CO2 25 21 - 32 mmol/L    Anion Gap 11 3 - 16    Glucose 153 (H) 70 - 99 mg/dL    BUN 14 7 - 20 mg/dL    CREATININE 1.0 0.8 - 1.3 mg/dL    GFR Non-African American >60 >60    GFR African American >60 >60    Calcium 8.5 8.3 - 10.6 mg/dL   Magnesium    Collection Time: 06/20/19  6:41 AM   Result Value Ref Range    Magnesium 1.70 (L) 1.80 - 2.40 mg/dL   CBC auto differential    Collection Time: 06/20/19  6:41 AM   Result Value Ref Range    WBC 21.8 (H) 4.0 - 11.0 K/uL    RBC 3.99 (L) 4.20 - 5.90 M/uL    Hemoglobin 11.1 (L) 13.5 - 17.5 g/dL    Hematocrit 33.9 (L) 40.5 - 52.5 %    MCV 84.9 80.0 - 100.0 fL    MCH 27.8 26.0 - 34.0 pg    MCHC 32.7 31.0 - 36.0 g/dL    RDW 15.2 12.4 - 15.4 %    Platelets 955 895 - 280 K/uL    MPV 7.3 5.0 - 10.5 fL    Neutrophils % 89.5 %    Lymphocytes % 4.4 %    Monocytes % 5.7 %    Eosinophils % 0.2 %    Basophils % 0.2 %    Neutrophils # 19.5 (H) 1.7 - 7.7 K/uL    Lymphocytes # 1.0 1.0 - 5.1 K/uL    Monocytes # 1.2 0.0 - 1.3 K/uL    Eosinophils # 0.1 0.0 - 0.6 K/uL    Basophils # 0.1 0.0 - 0.2 K/uL   POCT Glucose    Collection Time: 06/20/19  7:41 AM   Result Value Ref Range    POC Glucose 143 (H) 70 - 99 mg/dl    Performed on ACCU-CHEK        -----------------------------------------------------------------  Imaging  Us Scrotum And Testicles    Result Date: 6/18/2019  EXAMINATION: ULTRASOUND OF THE SCROTUM/TESTICLES WITH COLOR DOPPLER FLOW EVALUATION 6/18/2019 COMPARISON: None.  HISTORY: ORDERING SYSTEM PROVIDED HISTORY: testicular pain x 4-5 days, fever FINDINGS: Measurements: Right testicle: 3.6 x 3.3 x 2.5 cm. Left testicle: 4.4 x 3.1 x 2.7 cm Right: Grey scale: The right testicle demonstrates normal homogeneous echotexture without focal lesion. No evidence of testicular microlithiasis. Doppler Evaluation:  There is normal arterial and venous Doppler flow within the testicle. Scrotal Sac:  Small hydrocele. Epididymis:  No acute abnormality. Left: Grey scale: The left testicle demonstrates normal homogeneous echotexture without focal lesion. No evidence of testicular microlithiasis. Doppler Evaluation:  There is normal arterial and venous Doppler flow within the testicle. Scrotal Sac: Moderate sized hydrocele Epididymis:  No acute abnormality. Small right-sided and moderate left-sided hydrocele. No evidence of torsion, orchitis or epididymitis     Ct Abdomen Pelvis W Iv Contrast Additional Contrast? None    Result Date: 6/18/2019  EXAMINATION: CT OF THE ABDOMEN AND PELVIS WITH CONTRAST 6/18/2019 2:58 pm TECHNIQUE: CT of the abdomen and pelvis was performed with the administration of intravenous contrast. Multiplanar reformatted images are provided for review. Dose modulation, iterative reconstruction, and/or weight based adjustment of the mA/kV was utilized to reduce the radiation dose to as low as reasonably achievable. COMPARISON: CT abdomen and pelvis September 22, 2009 HISTORY: ORDERING SYSTEM PROVIDED HISTORY: lower abd pain radiating to scrotum (scrotal US normal), fever,tachycardic TECHNOLOGIST PROVIDED HISTORY: If patient is on cardiac monitor and/or pulse ox, they may be taken off cardiac monitor and pulse ox, left on O2 if currently on. All monitors reattached when patient returns to room. Additional Contrast?->None Ordering Physician Provided Reason for Exam: Abdominal Pain Acuity: Acute Type of Exam: Initial FINDINGS: Lower Chest: No evidence of pneumonia or other acute findings. Organs:  There is a left-sided stone at the UPJ measuring 7 mm. There is mild left-sided hydronephrosis. Small stones elsewhere in the kidneys measuring 4 mm and less in size. No evidence of pyelonephritis or cholecystitis. A small 4 mm gallstone is noted. Subcentimeter low-density lesions in the liver are too small to accurately characterize but most likely cysts or hemangiomata. There are multiple cysts in the liver. Higher density lesions measuring up to 2.7 cm are indeterminate but may be high-density cysts. GI/Bowel: No bowel obstruction or other acute process demonstrated. No evidence of colitis, diverticulitis or appendicitis. There is scattered colonic diverticulosis. Pelvis: In the lumen of the bladder there is a stone measuring 9 mm. Prostate gland is quite enlarged, mildly increased in size from the remote comparison. This causes impression upon the base the bladder. Normal distention of the bladder. Peritoneum/Retroperitoneum: No free air, free fluid or abscess. Small fat containing umbilical hernia. Bones/Soft Tissues: No fracture or other acute osseous process. Advanced degenerative disc disease changes at L4-5 and L5-S1     7 mm stone at the left UVJ causing mild hydronephrosis. There is a separate renal 9 mm stone in the lumen of the bladder. Other nonacute findings as discussed         Objective:   Vitals: BP (!) 150/84   Pulse 108   Temp 97.9 °F (36.6 °C) (Oral)   Resp 18   Ht 5' 7\" (1.702 m)   Wt 176 lb 9.4 oz (80.1 kg)   SpO2 92%   BMI 27.66 kg/m²   General appearance: Laying in bed  Skin: Warm  HEENT: Head: Normocephalic, no lesions, without obvious abnormality.   Neck: no adenopathy, no carotid bruit, no JVD, supple, symmetrical, trachea midline and thyroid not enlarged, symmetric, no tenderness/mass/nodules  Lungs: diminished breath sounds bilaterally  Heart: regular rate and rhythm  Abdomen: soft, non-tender; bowel sounds normal; no masses,  no organomegaly  Extremities: extremities normal, atraumatic, no cyanosis or edema  Neurologic: Mental status: Alert, oriented, thought content appropriate      Assessment/Plan:   Patient Active Problem List:       Left ureterolithiasis: Reason for sepsis. Noted to be 7 mm at the left UVJ junction causing hydronephrosis. -s/p CYSTOSCOPY LEFT URETERAL STENT INSERTION on 6/18/19   -Pain control   -Antibiotics   -Urology consulted         Acute pyelonephritis: Complicated and due to ureterolithiasis. -Antibiotics   -Urine culture + Strep agalacticae        Severe sepsis: Improving. Attributed to urinary tract infection. Met criteria on presentation with fever, WBC and encephalopathy.   -Antibiotics        Acute encephalopathy: Improved. Metabolic and due to sepsis. As per spouse, patient was confused. -Expect improvement with resolution of underlining infection   -Neurochecks   -Supportive care        Diabetes mellitus type 2: Controlled as last available hemoglobin A1c being 7.6 on 5/16/2019.   -Sliding scale insulin   -Accu-Cheks   -Plan to resume home regimen on discharge        Essential hypertension: Controlled.    -Continue Norvasc   -Hold lisinopril for now   -PRN hydralazine        Disposition:   1. TBD    Hernán Foster MD, [address]  6/20/2019  11:46 AM  [phone]

## 2019-06-20 NOTE — PROGRESS NOTES
MD aware of elevated HR and fever since surgery, orders for IS per RT.  Electronically signed by Christen Benson RN on 6/20/2019 at 12:12 PM

## 2019-06-20 NOTE — CARE COORDINATION
Reviewed chart, met with patient to assess possible discharge needs. Explained Case Management role/services. DEMOGRAPHICS: verified as accurate. CURRENT TYPE OF DWELLIN story home. LIVING ARRANGEMENTS: With spouse, dtr(), 2 grandchildren    LEVEL OF FUNCTION/SUPPORT: Pt reported that he was completely independent PTA. PCP: Dior Turner MD    LAST VISIT TO PCP: Last month. DME: None    OXYGEN/HD: No    ACTIVE COMMUNITY RESOURCES/AGENCIES/SKILLED HOME CARE: Pt stated that he is not active with any community agencies/support. LIMITATIONS TO MEDICATION ADHERENCE: Pt stated that he has good insurance, has all of his medications filled at [facility] and takes medications as prescribed. Pt is not interested in Meds to bed. TRANSPORTATION IN THE COMMUNITY: Pt is an active . TRANSPORTATION UPON D/C: Family to transport home. TENTATIVE D/C PLAN:  Pt plans to return home upon d/c and does not anticipate any d/c needs. SW provided contact information for patient or family to call with any questions. SW will follow and assist as needed.     LUCIANO Newman    [phone]  Electronically signed by Darío Davison on 2019 at 1:12 PM

## 2019-06-21 LAB
ANION GAP SERPL CALCULATED.3IONS-SCNC: 12 MMOL/L (ref 3–16)
BASOPHILS ABSOLUTE: 0.1 K/UL (ref 0–0.2)
BASOPHILS RELATIVE PERCENT: 0.4 %
BUN BLDV-MCNC: 9 MG/DL (ref 7–20)
CALCIUM SERPL-MCNC: 8.8 MG/DL (ref 8.3–10.6)
CHLORIDE BLD-SCNC: 103 MMOL/L (ref 99–110)
CO2: 25 MMOL/L (ref 21–32)
CREAT SERPL-MCNC: 0.9 MG/DL (ref 0.8–1.3)
EOSINOPHILS ABSOLUTE: 0.2 K/UL (ref 0–0.6)
EOSINOPHILS RELATIVE PERCENT: 1.1 %
GFR AFRICAN AMERICAN: >60
GFR NON-AFRICAN AMERICAN: >60
GLUCOSE BLD-MCNC: 156 MG/DL (ref 70–99)
GLUCOSE BLD-MCNC: 172 MG/DL (ref 70–99)
GLUCOSE BLD-MCNC: 188 MG/DL (ref 70–99)
GLUCOSE BLD-MCNC: 224 MG/DL (ref 70–99)
GLUCOSE BLD-MCNC: 240 MG/DL (ref 70–99)
HCT VFR BLD CALC: 34.1 % (ref 40.5–52.5)
HEMOGLOBIN: 11.6 G/DL (ref 13.5–17.5)
LYMPHOCYTES ABSOLUTE: 0.7 K/UL (ref 1–5.1)
LYMPHOCYTES RELATIVE PERCENT: 3.9 %
MAGNESIUM: 1.7 MG/DL (ref 1.8–2.4)
MCH RBC QN AUTO: 28.9 PG (ref 26–34)
MCHC RBC AUTO-ENTMCNC: 34 G/DL (ref 31–36)
MCV RBC AUTO: 84.9 FL (ref 80–100)
MONOCYTES ABSOLUTE: 0.9 K/UL (ref 0–1.3)
MONOCYTES RELATIVE PERCENT: 4.8 %
NEUTROPHILS ABSOLUTE: 16.4 K/UL (ref 1.7–7.7)
NEUTROPHILS RELATIVE PERCENT: 89.8 %
PDW BLD-RTO: 14.9 % (ref 12.4–15.4)
PERFORMED ON: ABNORMAL
PLATELET # BLD: 225 K/UL (ref 135–450)
PMV BLD AUTO: 7.2 FL (ref 5–10.5)
POTASSIUM REFLEX MAGNESIUM: 3.2 MMOL/L (ref 3.5–5.1)
RBC # BLD: 4.01 M/UL (ref 4.2–5.9)
SODIUM BLD-SCNC: 140 MMOL/L (ref 136–145)
WBC # BLD: 18.2 K/UL (ref 4–11)

## 2019-06-21 PROCEDURE — 6360000002 HC RX W HCPCS: Performed by: INTERNAL MEDICINE

## 2019-06-21 PROCEDURE — 80048 BASIC METABOLIC PNL TOTAL CA: CPT

## 2019-06-21 PROCEDURE — 85025 COMPLETE CBC W/AUTO DIFF WBC: CPT

## 2019-06-21 PROCEDURE — 94760 N-INVAS EAR/PLS OXIMETRY 1: CPT

## 2019-06-21 PROCEDURE — 36415 COLL VENOUS BLD VENIPUNCTURE: CPT

## 2019-06-21 PROCEDURE — 6370000000 HC RX 637 (ALT 250 FOR IP): Performed by: INTERNAL MEDICINE

## 2019-06-21 PROCEDURE — 51798 US URINE CAPACITY MEASURE: CPT

## 2019-06-21 PROCEDURE — 2580000003 HC RX 258: Performed by: INTERNAL MEDICINE

## 2019-06-21 PROCEDURE — 83735 ASSAY OF MAGNESIUM: CPT

## 2019-06-21 PROCEDURE — 1200000000 HC SEMI PRIVATE

## 2019-06-21 PROCEDURE — 2700000000 HC OXYGEN THERAPY PER DAY

## 2019-06-21 RX ADMIN — LOVASTATIN 20 MG: 10 TABLET ORAL at 21:30

## 2019-06-21 RX ADMIN — INSULIN LISPRO 1 UNITS: 100 INJECTION, SOLUTION INTRAVENOUS; SUBCUTANEOUS at 21:29

## 2019-06-21 RX ADMIN — CEFTRIAXONE 1 G: 1 INJECTION, POWDER, FOR SOLUTION INTRAMUSCULAR; INTRAVENOUS at 23:04

## 2019-06-21 RX ADMIN — Medication 10 ML: at 21:31

## 2019-06-21 RX ADMIN — OXYCODONE HYDROCHLORIDE AND ACETAMINOPHEN 1000 MG: 500 TABLET ORAL at 08:18

## 2019-06-21 RX ADMIN — PHENAZOPYRIDINE HYDROCHLORIDE 200 MG: 100 TABLET ORAL at 08:18

## 2019-06-21 RX ADMIN — SODIUM CHLORIDE: 9 INJECTION, SOLUTION INTRAVENOUS at 00:14

## 2019-06-21 RX ADMIN — PANTOPRAZOLE SODIUM 40 MG: 40 TABLET, DELAYED RELEASE ORAL at 06:19

## 2019-06-21 RX ADMIN — TAMSULOSIN HYDROCHLORIDE 0.4 MG: 0.4 CAPSULE ORAL at 08:18

## 2019-06-21 RX ADMIN — Medication 1 CAPSULE: at 08:18

## 2019-06-21 RX ADMIN — HYDROCODONE BITARTRATE AND ACETAMINOPHEN 2 TABLET: 5; 325 TABLET ORAL at 19:19

## 2019-06-21 RX ADMIN — MULTIPLE VITAMINS W/ MINERALS TAB 1 TABLET: TAB at 08:18

## 2019-06-21 RX ADMIN — HYDROCODONE BITARTRATE AND ACETAMINOPHEN 2 TABLET: 5; 325 TABLET ORAL at 08:24

## 2019-06-21 RX ADMIN — AMLODIPINE BESYLATE 10 MG: 10 TABLET ORAL at 08:18

## 2019-06-21 RX ADMIN — POTASSIUM CHLORIDE 40 MEQ: 20 TABLET, EXTENDED RELEASE ORAL at 09:18

## 2019-06-21 RX ADMIN — INSULIN LISPRO 4 UNITS: 100 INJECTION, SOLUTION INTRAVENOUS; SUBCUTANEOUS at 14:26

## 2019-06-21 RX ADMIN — INSULIN LISPRO 2 UNITS: 100 INJECTION, SOLUTION INTRAVENOUS; SUBCUTANEOUS at 08:18

## 2019-06-21 RX ADMIN — INSULIN LISPRO 4 UNITS: 100 INJECTION, SOLUTION INTRAVENOUS; SUBCUTANEOUS at 17:06

## 2019-06-21 ASSESSMENT — PAIN DESCRIPTION - PAIN TYPE
TYPE: ACUTE PAIN

## 2019-06-21 ASSESSMENT — PAIN DESCRIPTION - ONSET
ONSET: ON-GOING
ONSET: ON-GOING
ONSET: GRADUAL

## 2019-06-21 ASSESSMENT — PAIN DESCRIPTION - LOCATION
LOCATION: GROIN

## 2019-06-21 ASSESSMENT — PAIN SCALES - GENERAL
PAINLEVEL_OUTOF10: 0
PAINLEVEL_OUTOF10: 5
PAINLEVEL_OUTOF10: 0
PAINLEVEL_OUTOF10: 0
PAINLEVEL_OUTOF10: 8
PAINLEVEL_OUTOF10: 7
PAINLEVEL_OUTOF10: 0

## 2019-06-21 ASSESSMENT — PAIN DESCRIPTION - DESCRIPTORS
DESCRIPTORS: THROBBING;SHARP

## 2019-06-21 ASSESSMENT — PAIN DESCRIPTION - PROGRESSION
CLINICAL_PROGRESSION: NOT CHANGED
CLINICAL_PROGRESSION: NOT CHANGED
CLINICAL_PROGRESSION: GRADUALLY IMPROVING

## 2019-06-21 ASSESSMENT — PAIN DESCRIPTION - ORIENTATION
ORIENTATION: LEFT

## 2019-06-21 ASSESSMENT — PAIN DESCRIPTION - FREQUENCY
FREQUENCY: CONTINUOUS

## 2019-06-21 NOTE — PROGRESS NOTES
Pt Name: Mikey Murray  Medical Record Number: [id number]  Date of Birth 1955   Today's Date: 6/21/2019      Subjective:  Awake in chair, Overall continues to feel better. Left testicle still very tender, increased swelling. Kingsley catheter dc'd yesterday. One PVR documented of 308cc, would like more PVR's checked/documented. Increased pain to testicle when urinating. ROS: Constitutional: Fever up to 100.3 overnight    Vitals:  Vitals:    06/21/19 0426 06/21/19 0755 06/21/19 0757 06/21/19 0915   BP: 139/83 (!) 149/79     Pulse: 110 112     Resp: 16 16     Temp: 98.3 °F (36.8 °C) 100 °F (37.8 °C)  100 °F (37.8 °C)   TempSrc: Oral Oral     SpO2: 90% (!) 88% 92%    Weight:       Height:         I/O last 3 completed shifts: In: 1380 [P.O.:330;  I.V.:1000; IV Piggyback:50]  Out: 4700 [Urine:4700]    Exam:  General: Awake, oriented, no acute distress  Respiratory: Nonlabored breathing  Abdomen: Soft, non-tender, non-distended, no masses  : spontaneously voiding, left testicle tender, increased swelling  Skin: Skin color, texture, turgor normal, no rashes or lesions  Neurologic: no gross deficits    CURRENT MEDICATIONS   Scheduled Meds:   amLODIPine  10 mg Oral Daily    lovastatin  20 mg Oral Nightly    pantoprazole  40 mg Oral QAM AC    therapeutic multivitamin-minerals  1 tablet Oral Daily    tamsulosin  0.4 mg Oral Daily    vitamin C  1,000 mg Oral Daily    cefTRIAXone (ROCEPHIN) IV  1 g Intravenous Q24H    lactobacillus  1 capsule Oral Daily with breakfast    sodium chloride flush  10 mL Intravenous 2 times per day    insulin lispro  0-12 Units Subcutaneous TID WC    insulin lispro  0-6 Units Subcutaneous Nightly    sodium chloride  1,000 mL Intravenous Once     Continuous Infusions:   sodium chloride 100 mL/hr at 06/21/19 0014    dextrose       PRN Meds:.potassium chloride **OR** potassium alternative oral replacement **OR** potassium chloride, magnesium sulfate, magnesium hydroxide,

## 2019-06-21 NOTE — PROGRESS NOTES
Hospital Medicine    Progress Note    Admit Date: 2019    PCP: Chandana Dubon MD                  : 1955  MRN: [id number]    Subjective: Interval History: less fever  Feels better  Still on oxygen    Diet: DIET CARB CONTROL; Dietary Nutrition Supplements: Standard High Calorie Oral Supplement      Data:   Scheduled Meds:   amLODIPine  10 mg Oral Daily    lovastatin  20 mg Oral Nightly    pantoprazole  40 mg Oral QAM AC    therapeutic multivitamin-minerals  1 tablet Oral Daily    tamsulosin  0.4 mg Oral Daily    vitamin C  1,000 mg Oral Daily    cefTRIAXone (ROCEPHIN) IV  1 g Intravenous Q24H    lactobacillus  1 capsule Oral Daily with breakfast    sodium chloride flush  10 mL Intravenous 2 times per day    insulin lispro  0-12 Units Subcutaneous TID WC    insulin lispro  0-6 Units Subcutaneous Nightly    sodium chloride  1,000 mL Intravenous Once     Continuous Infusions:   dextrose       PRN Meds:potassium chloride **OR** potassium alternative oral replacement **OR** potassium chloride, magnesium sulfate, magnesium hydroxide, ondansetron, famotidine, nicotine, acetaminophen, HYDROcodone 5 mg - acetaminophen **OR** HYDROcodone 5 mg - acetaminophen, morphine **OR** morphine, sodium chloride flush, glucose, dextrose, glucagon (rDNA), dextrose  I/O last 3 completed shifts: In: 2573 [P.O.:570;  I.V.:1650; IV Piggyback:50]  Out: 8646 [Urine:3040]  I/O this shift:  In: 240 [P.O.:240]  Out: 175 [Urine:175]    Intake/Output Summary (Last 24 hours) at 2019 1609  Last data filed at 2019 1519  Gross per 24 hour   Intake 2510 ml   Output 3015 ml   Net -505 ml         All labs past 24 hours:   Recent Results (from the past 24 hour(s))   POCT Glucose    Collection Time: 19  4:11 PM   Result Value Ref Range    POC Glucose 262 (H) 70 - 99 mg/dl    Performed on ACCU-CHEK    POCT Glucose    Collection Time: 19  8:54 PM   Result Value Ref Range    POC Glucose 217 (H) 70 testicle: 3.6 x 3.3 x 2.5 cm. Left testicle: 4.4 x 3.1 x 2.7 cm Right: Grey scale: The right testicle demonstrates normal homogeneous echotexture without focal lesion. No evidence of testicular microlithiasis. Doppler Evaluation:  There is normal arterial and venous Doppler flow within the testicle. Scrotal Sac:  Small hydrocele. Epididymis:  No acute abnormality. Left: Grey scale: The left testicle demonstrates normal homogeneous echotexture without focal lesion. No evidence of testicular microlithiasis. Doppler Evaluation:  There is normal arterial and venous Doppler flow within the testicle. Scrotal Sac: Moderate sized hydrocele Epididymis:  No acute abnormality. Small right-sided and moderate left-sided hydrocele. No evidence of torsion, orchitis or epididymitis     Ct Abdomen Pelvis W Iv Contrast Additional Contrast? None    Result Date: 6/18/2019  EXAMINATION: CT OF THE ABDOMEN AND PELVIS WITH CONTRAST 6/18/2019 2:58 pm TECHNIQUE: CT of the abdomen and pelvis was performed with the administration of intravenous contrast. Multiplanar reformatted images are provided for review. Dose modulation, iterative reconstruction, and/or weight based adjustment of the mA/kV was utilized to reduce the radiation dose to as low as reasonably achievable. COMPARISON: CT abdomen and pelvis September 22, 2009 HISTORY: ORDERING SYSTEM PROVIDED HISTORY: lower abd pain radiating to scrotum (scrotal US normal), fever,tachycardic TECHNOLOGIST PROVIDED HISTORY: If patient is on cardiac monitor and/or pulse ox, they may be taken off cardiac monitor and pulse ox, left on O2 if currently on. All monitors reattached when patient returns to room. Additional Contrast?->None Ordering Physician Provided Reason for Exam: Abdominal Pain Acuity: Acute Type of Exam: Initial FINDINGS: Lower Chest: No evidence of pneumonia or other acute findings. Organs: There is a left-sided stone at the UPJ measuring 7 mm.   There is mild left-sided hydronephrosis. Small stones elsewhere in the kidneys measuring 4 mm and less in size. No evidence of pyelonephritis or cholecystitis. A small 4 mm gallstone is noted. Subcentimeter low-density lesions in the liver are too small to accurately characterize but most likely cysts or hemangiomata. There are multiple cysts in the liver. Higher density lesions measuring up to 2.7 cm are indeterminate but may be high-density cysts. GI/Bowel: No bowel obstruction or other acute process demonstrated. No evidence of colitis, diverticulitis or appendicitis. There is scattered colonic diverticulosis. Pelvis: In the lumen of the bladder there is a stone measuring 9 mm. Prostate gland is quite enlarged, mildly increased in size from the remote comparison. This causes impression upon the base the bladder. Normal distention of the bladder. Peritoneum/Retroperitoneum: No free air, free fluid or abscess. Small fat containing umbilical hernia. Bones/Soft Tissues: No fracture or other acute osseous process. Advanced degenerative disc disease changes at L4-5 and L5-S1     7 mm stone at the left UVJ causing mild hydronephrosis. There is a separate renal 9 mm stone in the lumen of the bladder. Other nonacute findings as discussed         Objective:   Vitals: BP (!) 140/91   Pulse 112   Temp 99 °F (37.2 °C) (Oral)   Resp 15   Ht 5' 7\" (1.702 m)   Wt 182 lb 15.7 oz (83 kg)   SpO2 92%   BMI 28.66 kg/m²   General appearance: Laying in bed  Skin: Warm  HEENT: Head: Normocephalic, no lesions, without obvious abnormality.   Neck: no adenopathy, no carotid bruit, no JVD, supple, symmetrical, trachea midline and thyroid not enlarged, symmetric, no tenderness/mass/nodules  Lungs: diminished breath sounds bilaterally  Heart: regular rate and rhythm  Abdomen: soft, non-tender; bowel sounds normal; no masses,  no organomegaly  Extremities: extremities normal, atraumatic, no cyanosis or edema  Neurologic: Mental status: Alert, oriented, thought content appropriate      Assessment/Plan:   Patient Active Problem List:       Left ureterolithiasis: Reason for sepsis. Noted to be 7 mm at the left UVJ junction causing hydronephrosis. -s/p CYSTOSCOPY LEFT URETERAL STENT INSERTION on 6/18/19   -Pain control   -Antibiotics-rocephin   -Urology consulted    - outpt [facility]      Acute pyelonephritis: Complicated and due to ureterolithiasis. -Antibiotics rocephin   -Urine culture + Strep agalacticae        Severe sepsis: Improving. Attributed to urinary tract infection. Met criteria on presentation with fever, WBC and encephalopathy.   -Antibiotics        Acute encephalopathy: Improved. Metabolic and due to sepsis. As per spouse, patient was confused. -Expect improvement with resolution of underlining infection   -Neurochecks   -Supportive care        Diabetes mellitus type 2: Controlled as last available hemoglobin A1c being 7.6 on 5/16/2019.   -Sliding scale insulin   -Accu-Cheks   -Plan to resume home regimen on discharge        Essential hypertension: Controlled.    -Continue Norvasc   -Hold lisinopril for now   -PRN hydralazine      Electronically signed by Oliverio Morgan MD on 6/21/2019 at 4:10 PM

## 2019-06-21 NOTE — PLAN OF CARE
Problem: Pain:  Goal: Control of acute pain  Description  Control of acute pain  Outcome: Ongoing  Pain/discomfort being managed with PRN analgesics per MD orders. Pt able to express presence and absence of pain and rate pain appropriately using numerical scale. Problem: Falls - Risk of:  Goal: Will remain free from falls  Description  Will remain free from falls  Outcome: Ongoing  Fall risk precautions in place. Bed in lowest position with wheels locked,bed alarm in place and activated, non-skid socks on pt, fall risk ID on pt, call light in reach, pt encouraged to call before getting out of bed and for any other needs or complaints. Problem: Urinary Elimination:  Goal: Signs and symptoms of infection will decrease  Description  Signs and symptoms of infection will decrease  Outcome: Ongoing  Patient reports decreased pain and burning upon urination. Patient established a urinary elimination routine.

## 2019-06-22 ENCOUNTER — APPOINTMENT (OUTPATIENT)
Dept: GENERAL RADIOLOGY | Age: 64
DRG: 853 | End: 2019-06-22
Payer: MEDICARE

## 2019-06-22 LAB
ANION GAP SERPL CALCULATED.3IONS-SCNC: 12 MMOL/L (ref 3–16)
BASOPHILS ABSOLUTE: 0.1 K/UL (ref 0–0.2)
BASOPHILS RELATIVE PERCENT: 0.4 %
BUN BLDV-MCNC: 11 MG/DL (ref 7–20)
CALCIUM SERPL-MCNC: 8.8 MG/DL (ref 8.3–10.6)
CHLORIDE BLD-SCNC: 101 MMOL/L (ref 99–110)
CO2: 28 MMOL/L (ref 21–32)
CREAT SERPL-MCNC: 1.1 MG/DL (ref 0.8–1.3)
EOSINOPHILS ABSOLUTE: 0.3 K/UL (ref 0–0.6)
EOSINOPHILS RELATIVE PERCENT: 1.9 %
GFR AFRICAN AMERICAN: >60
GFR NON-AFRICAN AMERICAN: >60
GLUCOSE BLD-MCNC: 180 MG/DL (ref 70–99)
GLUCOSE BLD-MCNC: 217 MG/DL (ref 70–99)
GLUCOSE BLD-MCNC: 221 MG/DL (ref 70–99)
GLUCOSE BLD-MCNC: 250 MG/DL (ref 70–99)
GLUCOSE BLD-MCNC: 362 MG/DL (ref 70–99)
HCT VFR BLD CALC: 31.8 % (ref 40.5–52.5)
HEMOGLOBIN: 10.9 G/DL (ref 13.5–17.5)
LYMPHOCYTES ABSOLUTE: 1 K/UL (ref 1–5.1)
LYMPHOCYTES RELATIVE PERCENT: 7.1 %
MAGNESIUM: 1.6 MG/DL (ref 1.8–2.4)
MCH RBC QN AUTO: 28.9 PG (ref 26–34)
MCHC RBC AUTO-ENTMCNC: 34.1 G/DL (ref 31–36)
MCV RBC AUTO: 84.8 FL (ref 80–100)
MONOCYTES ABSOLUTE: 1 K/UL (ref 0–1.3)
MONOCYTES RELATIVE PERCENT: 6.7 %
NEUTROPHILS ABSOLUTE: 12.2 K/UL (ref 1.7–7.7)
NEUTROPHILS RELATIVE PERCENT: 83.9 %
PDW BLD-RTO: 15 % (ref 12.4–15.4)
PERFORMED ON: ABNORMAL
PLATELET # BLD: 239 K/UL (ref 135–450)
PMV BLD AUTO: 6.9 FL (ref 5–10.5)
POTASSIUM REFLEX MAGNESIUM: 3.6 MMOL/L (ref 3.5–5.1)
RBC # BLD: 3.76 M/UL (ref 4.2–5.9)
SODIUM BLD-SCNC: 141 MMOL/L (ref 136–145)
WBC # BLD: 14.6 K/UL (ref 4–11)

## 2019-06-22 PROCEDURE — 80048 BASIC METABOLIC PNL TOTAL CA: CPT

## 2019-06-22 PROCEDURE — 85025 COMPLETE CBC W/AUTO DIFF WBC: CPT

## 2019-06-22 PROCEDURE — 2580000003 HC RX 258: Performed by: INTERNAL MEDICINE

## 2019-06-22 PROCEDURE — 36415 COLL VENOUS BLD VENIPUNCTURE: CPT

## 2019-06-22 PROCEDURE — 71045 X-RAY EXAM CHEST 1 VIEW: CPT

## 2019-06-22 PROCEDURE — 6370000000 HC RX 637 (ALT 250 FOR IP): Performed by: INTERNAL MEDICINE

## 2019-06-22 PROCEDURE — 83735 ASSAY OF MAGNESIUM: CPT

## 2019-06-22 PROCEDURE — 94760 N-INVAS EAR/PLS OXIMETRY 1: CPT

## 2019-06-22 PROCEDURE — 2580000003 HC RX 258: Performed by: UROLOGY

## 2019-06-22 PROCEDURE — 1200000000 HC SEMI PRIVATE

## 2019-06-22 PROCEDURE — 2700000000 HC OXYGEN THERAPY PER DAY

## 2019-06-22 PROCEDURE — 6360000002 HC RX W HCPCS: Performed by: UROLOGY

## 2019-06-22 RX ADMIN — OXYCODONE HYDROCHLORIDE AND ACETAMINOPHEN 1000 MG: 500 TABLET ORAL at 09:43

## 2019-06-22 RX ADMIN — Medication 1 CAPSULE: at 10:26

## 2019-06-22 RX ADMIN — INSULIN LISPRO 2 UNITS: 100 INJECTION, SOLUTION INTRAVENOUS; SUBCUTANEOUS at 10:25

## 2019-06-22 RX ADMIN — Medication 10 ML: at 21:35

## 2019-06-22 RX ADMIN — INSULIN LISPRO 2 UNITS: 100 INJECTION, SOLUTION INTRAVENOUS; SUBCUTANEOUS at 21:34

## 2019-06-22 RX ADMIN — AMLODIPINE BESYLATE 10 MG: 10 TABLET ORAL at 09:43

## 2019-06-22 RX ADMIN — INSULIN LISPRO 10 UNITS: 100 INJECTION, SOLUTION INTRAVENOUS; SUBCUTANEOUS at 18:11

## 2019-06-22 RX ADMIN — PANTOPRAZOLE SODIUM 40 MG: 40 TABLET, DELAYED RELEASE ORAL at 06:10

## 2019-06-22 RX ADMIN — MULTIPLE VITAMINS W/ MINERALS TAB 1 TABLET: TAB at 09:44

## 2019-06-22 RX ADMIN — HYDROCODONE BITARTRATE AND ACETAMINOPHEN 2 TABLET: 5; 325 TABLET ORAL at 09:44

## 2019-06-22 RX ADMIN — CEFTRIAXONE 2 G: 2 INJECTION, POWDER, FOR SOLUTION INTRAMUSCULAR; INTRAVENOUS at 10:27

## 2019-06-22 RX ADMIN — HYDROCODONE BITARTRATE AND ACETAMINOPHEN 1 TABLET: 5; 325 TABLET ORAL at 18:23

## 2019-06-22 RX ADMIN — TAMSULOSIN HYDROCHLORIDE 0.4 MG: 0.4 CAPSULE ORAL at 09:43

## 2019-06-22 RX ADMIN — Medication 10 ML: at 09:31

## 2019-06-22 RX ADMIN — LOVASTATIN 20 MG: 10 TABLET ORAL at 21:34

## 2019-06-22 ASSESSMENT — PAIN DESCRIPTION - PROGRESSION
CLINICAL_PROGRESSION: GRADUALLY WORSENING
CLINICAL_PROGRESSION: GRADUALLY WORSENING

## 2019-06-22 ASSESSMENT — PAIN SCALES - GENERAL
PAINLEVEL_OUTOF10: 0
PAINLEVEL_OUTOF10: 0
PAINLEVEL_OUTOF10: 7
PAINLEVEL_OUTOF10: 0
PAINLEVEL_OUTOF10: 4

## 2019-06-22 ASSESSMENT — PAIN DESCRIPTION - ORIENTATION
ORIENTATION: LEFT
ORIENTATION: LEFT

## 2019-06-22 ASSESSMENT — PAIN DESCRIPTION - DESCRIPTORS
DESCRIPTORS: DISCOMFORT;THROBBING
DESCRIPTORS: DISCOMFORT;THROBBING

## 2019-06-22 ASSESSMENT — PAIN DESCRIPTION - PAIN TYPE
TYPE: ACUTE PAIN
TYPE: ACUTE PAIN

## 2019-06-22 ASSESSMENT — PAIN DESCRIPTION - ONSET
ONSET: ON-GOING
ONSET: ON-GOING

## 2019-06-22 ASSESSMENT — PAIN DESCRIPTION - FREQUENCY
FREQUENCY: CONTINUOUS
FREQUENCY: CONTINUOUS

## 2019-06-22 ASSESSMENT — PAIN - FUNCTIONAL ASSESSMENT
PAIN_FUNCTIONAL_ASSESSMENT: ACTIVITIES ARE NOT PREVENTED
PAIN_FUNCTIONAL_ASSESSMENT: ACTIVITIES ARE NOT PREVENTED

## 2019-06-22 ASSESSMENT — PAIN DESCRIPTION - LOCATION
LOCATION: GROIN
LOCATION: GROIN

## 2019-06-22 NOTE — PROGRESS NOTES
C/o dysuria (this is secondary to the JJ stent) and scrotal pain (secondary to a severe epidymo-orchitis with skin fixation)    Tmax 100.6    Urine cx strep which Rocephin should cover- will increase dose to 2 gm q day

## 2019-06-22 NOTE — PROGRESS NOTES
Hospital Medicine    Progress Note    Admit Date: 2019    PCP: Hieu Moon MD                  : 1955  MRN: [id number]    Subjective: Interval History: Still with low-grade fever but overall feels much better.'s patient is still on oxygen      Diet: DIET CARB CONTROL; Dietary Nutrition Supplements: Standard High Calorie Oral Supplement      Data:   Scheduled Meds:   cefTRIAXone (ROCEPHIN) IV  2 g Intravenous Q24H    amLODIPine  10 mg Oral Daily    lovastatin  20 mg Oral Nightly    pantoprazole  40 mg Oral QAM AC    therapeutic multivitamin-minerals  1 tablet Oral Daily    tamsulosin  0.4 mg Oral Daily    vitamin C  1,000 mg Oral Daily    lactobacillus  1 capsule Oral Daily with breakfast    sodium chloride flush  10 mL Intravenous 2 times per day    insulin lispro  0-12 Units Subcutaneous TID WC    insulin lispro  0-6 Units Subcutaneous Nightly    sodium chloride  1,000 mL Intravenous Once     Continuous Infusions:   dextrose       PRN Meds:potassium chloride **OR** potassium alternative oral replacement **OR** potassium chloride, magnesium sulfate, magnesium hydroxide, ondansetron, famotidine, nicotine, acetaminophen, HYDROcodone 5 mg - acetaminophen **OR** HYDROcodone 5 mg - acetaminophen, morphine **OR** morphine, sodium chloride flush, glucose, dextrose, glucagon (rDNA), dextrose  I/O last 3 completed shifts: In: 1200 [P.O.:1200]  Out: 1835 [Urine:1835]  No intake/output data recorded.     Intake/Output Summary (Last 24 hours) at 2019 1608  Last data filed at 2019 1434  Gross per 24 hour   Intake 960 ml   Output 1660 ml   Net -700 ml         All labs past 24 hours:   Recent Results (from the past 24 hour(s))   POCT Glucose    Collection Time: 19  4:40 PM   Result Value Ref Range    POC Glucose 224 (H) 70 - 99 mg/dl    Performed on ACCU-CHEK    POCT Glucose    Collection Time: 19  8:46 PM   Result Value Ref Range    POC Glucose 156 (H) 70 - 99

## 2019-06-22 NOTE — PLAN OF CARE
Problem: Pain:  Description  Pain management should include both nonpharmacologic and pharmacologic interventions. Goal: Pain level will decrease  Description  Pain level will decrease  6/21/2019 2321 by Awilda Rod RN  Outcome: Ongoing   Pt provided with PRN pain meds as needed. Pt provided with emotional support  Problem: Pain:  Description  Pain management should include both nonpharmacologic and pharmacologic interventions. Goal: Control of acute pain  Description  Control of acute pain  6/21/2019 2321 by Awilda Rod RN  Outcome: Ongoing     Problem: Pain:  Description  Pain management should include both nonpharmacologic and pharmacologic interventions. Goal: Control of chronic pain  Description  Control of chronic pain  6/21/2019 2321 by Awilda Rod RN  Outcome: Ongoing     Problem: SAFETY  Goal: Free from accidental physical injury  6/21/2019 2321 by Awilda Rod RN  Outcome: Ongoing     Problem: DAILY CARE  Goal: Daily care needs are met  6/21/2019 2321 by Awilda Rod RN  Outcome: Ongoing     Problem: PAIN  Goal: Patient's pain/discomfort is manageable  6/21/2019 2321 by Awilda Rod RN  Outcome: Ongoing     Problem: SKIN INTEGRITY  Goal: Skin integrity is maintained or improved  6/21/2019 2321 by Awilda Rod RN  Outcome: Ongoing     Problem: KNOWLEDGE DEFICIT  Goal: Patient/S.O. demonstrates understanding of disease process, treatment plan, medications, and discharge instructions.   6/21/2019 2321 by Awilda Rod RN  Outcome: Ongoing     Problem: DISCHARGE BARRIERS  Goal: Patient's continuum of care needs are met  6/21/2019 2321 by Awilda Rod RN  Outcome: Ongoing     Problem: Falls - Risk of:  Goal: Will remain free from falls  Description  Will remain free from falls  6/21/2019 2321 by Awilda Rod RN  Outcome: Ongoing     Problem: Falls - Risk of:  Goal: Absence of physical injury  Description  Absence of physical injury  6/21/2019 2321 by Rod Oliver RN  Outcome: Ongoing     Problem: Urinary Elimination:  Goal: Signs and symptoms of infection will decrease  Description  Signs and symptoms of infection will decrease  6/21/2019 2321 by Rod Oliver RN  Outcome: Ongoing     Problem: Urinary Elimination:  Goal: Ability to reestablish a normal urinary elimination pattern will improve - after catheter removal  Description  Ability to reestablish a normal urinary elimination pattern will improve  6/21/2019 2321 by Rod Oliver RN  Outcome: Ongoing     Problem: Urinary Elimination:  Goal: Complications related to the disease process, condition or treatment will be avoided or minimized  Description  Complications related to the disease process, condition or treatment will be avoided or minimized  6/21/2019 2321 by Rod Oliver RN  Outcome: Ongoing   .

## 2019-06-22 NOTE — FLOWSHEET NOTE
Pt mentioned that his son  from suicide a month ago and expressed that he and his wife are having a hard time coping with his death.     19 1111   Encounter Summary   Services provided to: Patient and family together   Referral/Consult From: Family   Support System Spouse; Children;Family members   Continue Visiting   (visit, prayer, blessing  CL)   Complexity of Encounter Moderate   Length of Encounter 30 minutes   Routine   Type Follow up   Spiritual/Adventist   Type Spiritual support   Assessment Calm; Approachable;Grieving; Hopeful;Coping   Intervention Active listening;Explored feelings, thoughts, concerns;Prayer;Discussed relationship with God;Discussed belief system/Holiness practices/[place];Discussed illness/injury and it's impact   Outcome Engaged in conversation;Coping;Grieving   Electronically signed by Rico Quiroz on 2019 at 11:16 AM

## 2019-06-22 NOTE — PROGRESS NOTES
D: pt is able to transfer with nursing staff from bed to chair, but has some weakness A: this RN discussed plan of care with Dr. Jerrod Cortez and reported findings R: Dr. Jerrod Cortez ordered PT/OT for pt, family updated about plan of care, will continue to monitor

## 2019-06-23 LAB
ANION GAP SERPL CALCULATED.3IONS-SCNC: 15 MMOL/L (ref 3–16)
BASOPHILS ABSOLUTE: 0 K/UL (ref 0–0.2)
BASOPHILS RELATIVE PERCENT: 0.3 %
BUN BLDV-MCNC: 12 MG/DL (ref 7–20)
CALCIUM SERPL-MCNC: 9.2 MG/DL (ref 8.3–10.6)
CHLORIDE BLD-SCNC: 96 MMOL/L (ref 99–110)
CO2: 27 MMOL/L (ref 21–32)
CREAT SERPL-MCNC: 1.2 MG/DL (ref 0.8–1.3)
CULTURE, BLOOD 2: NORMAL
EOSINOPHILS ABSOLUTE: 0.3 K/UL (ref 0–0.6)
EOSINOPHILS RELATIVE PERCENT: 1.9 %
GFR AFRICAN AMERICAN: >60
GFR NON-AFRICAN AMERICAN: >60
GLUCOSE BLD-MCNC: 218 MG/DL (ref 70–99)
GLUCOSE BLD-MCNC: 276 MG/DL (ref 70–99)
GLUCOSE BLD-MCNC: 282 MG/DL (ref 70–99)
GLUCOSE BLD-MCNC: 300 MG/DL (ref 70–99)
GLUCOSE BLD-MCNC: 352 MG/DL (ref 70–99)
HCT VFR BLD CALC: 34.6 % (ref 40.5–52.5)
HEMOGLOBIN: 11.5 G/DL (ref 13.5–17.5)
LYMPHOCYTES ABSOLUTE: 1.4 K/UL (ref 1–5.1)
LYMPHOCYTES RELATIVE PERCENT: 8.4 %
MAGNESIUM: 1.8 MG/DL (ref 1.8–2.4)
MCH RBC QN AUTO: 28.1 PG (ref 26–34)
MCHC RBC AUTO-ENTMCNC: 33.3 G/DL (ref 31–36)
MCV RBC AUTO: 84.5 FL (ref 80–100)
MONOCYTES ABSOLUTE: 1.4 K/UL (ref 0–1.3)
MONOCYTES RELATIVE PERCENT: 8.6 %
NEUTROPHILS ABSOLUTE: 13 K/UL (ref 1.7–7.7)
NEUTROPHILS RELATIVE PERCENT: 80.8 %
PDW BLD-RTO: 15 % (ref 12.4–15.4)
PERFORMED ON: ABNORMAL
PLATELET # BLD: 287 K/UL (ref 135–450)
PMV BLD AUTO: 7.1 FL (ref 5–10.5)
POTASSIUM REFLEX MAGNESIUM: 3.5 MMOL/L (ref 3.5–5.1)
RBC # BLD: 4.09 M/UL (ref 4.2–5.9)
SODIUM BLD-SCNC: 138 MMOL/L (ref 136–145)
WBC # BLD: 16 K/UL (ref 4–11)

## 2019-06-23 PROCEDURE — 94761 N-INVAS EAR/PLS OXIMETRY MLT: CPT

## 2019-06-23 PROCEDURE — 80048 BASIC METABOLIC PNL TOTAL CA: CPT

## 2019-06-23 PROCEDURE — 6370000000 HC RX 637 (ALT 250 FOR IP): Performed by: INTERNAL MEDICINE

## 2019-06-23 PROCEDURE — 36415 COLL VENOUS BLD VENIPUNCTURE: CPT

## 2019-06-23 PROCEDURE — 2700000000 HC OXYGEN THERAPY PER DAY

## 2019-06-23 PROCEDURE — 83735 ASSAY OF MAGNESIUM: CPT

## 2019-06-23 PROCEDURE — 2580000003 HC RX 258: Performed by: INTERNAL MEDICINE

## 2019-06-23 PROCEDURE — 6360000002 HC RX W HCPCS: Performed by: INTERNAL MEDICINE

## 2019-06-23 PROCEDURE — 6360000002 HC RX W HCPCS: Performed by: UROLOGY

## 2019-06-23 PROCEDURE — 2580000003 HC RX 258: Performed by: UROLOGY

## 2019-06-23 PROCEDURE — 1200000000 HC SEMI PRIVATE

## 2019-06-23 PROCEDURE — 85025 COMPLETE CBC W/AUTO DIFF WBC: CPT

## 2019-06-23 RX ORDER — FUROSEMIDE 10 MG/ML
20 INJECTION INTRAMUSCULAR; INTRAVENOUS 3 TIMES DAILY
Status: DISCONTINUED | OUTPATIENT
Start: 2019-06-23 | End: 2019-06-24

## 2019-06-23 RX ORDER — FUROSEMIDE 10 MG/ML
20 INJECTION INTRAMUSCULAR; INTRAVENOUS ONCE
Status: DISCONTINUED | OUTPATIENT
Start: 2019-06-23 | End: 2019-06-23

## 2019-06-23 RX ADMIN — HYDROCODONE BITARTRATE AND ACETAMINOPHEN 1 TABLET: 5; 325 TABLET ORAL at 16:44

## 2019-06-23 RX ADMIN — ACETAMINOPHEN 650 MG: 325 TABLET ORAL at 22:04

## 2019-06-23 RX ADMIN — FUROSEMIDE 20 MG: 10 INJECTION, SOLUTION INTRAMUSCULAR; INTRAVENOUS at 22:04

## 2019-06-23 RX ADMIN — TAMSULOSIN HYDROCHLORIDE 0.4 MG: 0.4 CAPSULE ORAL at 08:41

## 2019-06-23 RX ADMIN — HYDROCODONE BITARTRATE AND ACETAMINOPHEN 1 TABLET: 5; 325 TABLET ORAL at 02:41

## 2019-06-23 RX ADMIN — MULTIPLE VITAMINS W/ MINERALS TAB 1 TABLET: TAB at 09:00

## 2019-06-23 RX ADMIN — FUROSEMIDE 20 MG: 10 INJECTION, SOLUTION INTRAMUSCULAR; INTRAVENOUS at 14:50

## 2019-06-23 RX ADMIN — INSULIN LISPRO 10 UNITS: 100 INJECTION, SOLUTION INTRAVENOUS; SUBCUTANEOUS at 12:57

## 2019-06-23 RX ADMIN — Medication 10 ML: at 11:36

## 2019-06-23 RX ADMIN — CEFTRIAXONE 2 G: 2 INJECTION, POWDER, FOR SOLUTION INTRAMUSCULAR; INTRAVENOUS at 11:34

## 2019-06-23 RX ADMIN — INSULIN LISPRO 4 UNITS: 100 INJECTION, SOLUTION INTRAVENOUS; SUBCUTANEOUS at 08:41

## 2019-06-23 RX ADMIN — OXYCODONE HYDROCHLORIDE AND ACETAMINOPHEN 1000 MG: 500 TABLET ORAL at 08:40

## 2019-06-23 RX ADMIN — INSULIN LISPRO 6 UNITS: 100 INJECTION, SOLUTION INTRAVENOUS; SUBCUTANEOUS at 18:57

## 2019-06-23 RX ADMIN — Medication 1 CAPSULE: at 08:41

## 2019-06-23 RX ADMIN — LOVASTATIN 20 MG: 10 TABLET ORAL at 22:04

## 2019-06-23 RX ADMIN — INSULIN LISPRO 4 UNITS: 100 INJECTION, SOLUTION INTRAVENOUS; SUBCUTANEOUS at 22:04

## 2019-06-23 RX ADMIN — AMLODIPINE BESYLATE 10 MG: 10 TABLET ORAL at 08:40

## 2019-06-23 RX ADMIN — HYDROCODONE BITARTRATE AND ACETAMINOPHEN 1 TABLET: 5; 325 TABLET ORAL at 08:41

## 2019-06-23 RX ADMIN — PANTOPRAZOLE SODIUM 40 MG: 40 TABLET, DELAYED RELEASE ORAL at 11:34

## 2019-06-23 RX ADMIN — Medication 10 ML: at 22:06

## 2019-06-23 ASSESSMENT — PAIN DESCRIPTION - PROGRESSION
CLINICAL_PROGRESSION: GRADUALLY WORSENING
CLINICAL_PROGRESSION: GRADUALLY WORSENING
CLINICAL_PROGRESSION: GRADUALLY IMPROVING
CLINICAL_PROGRESSION: GRADUALLY IMPROVING

## 2019-06-23 ASSESSMENT — PAIN DESCRIPTION - LOCATION
LOCATION: HEAD
LOCATION: HEAD;GROIN
LOCATION: HEAD
LOCATION: HEAD

## 2019-06-23 ASSESSMENT — PAIN DESCRIPTION - FREQUENCY
FREQUENCY: CONTINUOUS
FREQUENCY: INTERMITTENT
FREQUENCY: CONTINUOUS
FREQUENCY: CONTINUOUS

## 2019-06-23 ASSESSMENT — PAIN DESCRIPTION - PAIN TYPE
TYPE: ACUTE PAIN

## 2019-06-23 ASSESSMENT — PAIN SCALES - GENERAL
PAINLEVEL_OUTOF10: 3
PAINLEVEL_OUTOF10: 4
PAINLEVEL_OUTOF10: 3
PAINLEVEL_OUTOF10: 5
PAINLEVEL_OUTOF10: 0
PAINLEVEL_OUTOF10: 2
PAINLEVEL_OUTOF10: 5
PAINLEVEL_OUTOF10: 0
PAINLEVEL_OUTOF10: 3

## 2019-06-23 ASSESSMENT — PAIN - FUNCTIONAL ASSESSMENT
PAIN_FUNCTIONAL_ASSESSMENT: ACTIVITIES ARE NOT PREVENTED

## 2019-06-23 ASSESSMENT — PAIN DESCRIPTION - DESCRIPTORS
DESCRIPTORS: ACHING
DESCRIPTORS: ACHING
DESCRIPTORS: ACHING;THROBBING
DESCRIPTORS: HEADACHE

## 2019-06-23 ASSESSMENT — PAIN DESCRIPTION - ONSET
ONSET: ON-GOING

## 2019-06-23 ASSESSMENT — PAIN DESCRIPTION - ORIENTATION
ORIENTATION: LEFT
ORIENTATION: LEFT

## 2019-06-23 NOTE — PROGRESS NOTES
Patient has been noncompliant in diet resulting in labile glucose results. Witness many snacks, deserts and sweet beverages (soda) brought in by family members. Education provided and patient verbalizes understanding.

## 2019-06-23 NOTE — PLAN OF CARE
Problem: Pain:  Goal: Pain level will decrease  Description  Pain level will decrease  6/23/2019 0151 by Nikolay Hansen RN  Outcome: Ongoing  6/22/2019 2017 by Domingo Weaver RN  Outcome: Ongoing     Problem: SKIN INTEGRITY  Goal: Skin integrity is maintained or improved  6/23/2019 0151 by Nikolay Hansen RN  Outcome: Ongoing  6/22/2019 2017 by Domingo Weaver RN  Outcome: Ongoing     Problem: KNOWLEDGE DEFICIT  Goal: Patient/S.O. demonstrates understanding of disease process, treatment plan, medications, and discharge instructions.   6/23/2019 0151 by Nikolay Hansen RN  Outcome: Ongoing  6/22/2019 2017 by Domingo Weaver RN  Outcome: Ongoing

## 2019-06-24 LAB
ANION GAP SERPL CALCULATED.3IONS-SCNC: 15 MMOL/L (ref 3–16)
BASOPHILS ABSOLUTE: 0.1 K/UL (ref 0–0.2)
BASOPHILS RELATIVE PERCENT: 0.5 %
BUN BLDV-MCNC: 20 MG/DL (ref 7–20)
CALCIUM SERPL-MCNC: 9.8 MG/DL (ref 8.3–10.6)
CHLORIDE BLD-SCNC: 94 MMOL/L (ref 99–110)
CO2: 31 MMOL/L (ref 21–32)
CREAT SERPL-MCNC: 1.1 MG/DL (ref 0.8–1.3)
EOSINOPHILS ABSOLUTE: 0.3 K/UL (ref 0–0.6)
EOSINOPHILS RELATIVE PERCENT: 2 %
GFR AFRICAN AMERICAN: >60
GFR NON-AFRICAN AMERICAN: >60
GLUCOSE BLD-MCNC: 244 MG/DL (ref 70–99)
GLUCOSE BLD-MCNC: 246 MG/DL (ref 70–99)
GLUCOSE BLD-MCNC: 351 MG/DL (ref 70–99)
GLUCOSE BLD-MCNC: 400 MG/DL (ref 70–99)
GLUCOSE BLD-MCNC: 446 MG/DL (ref 70–99)
HCT VFR BLD CALC: 37.2 % (ref 40.5–52.5)
HEMOGLOBIN: 12.6 G/DL (ref 13.5–17.5)
LYMPHOCYTES ABSOLUTE: 1.5 K/UL (ref 1–5.1)
LYMPHOCYTES RELATIVE PERCENT: 8.7 %
MAGNESIUM: 1.9 MG/DL (ref 1.8–2.4)
MCH RBC QN AUTO: 28.4 PG (ref 26–34)
MCHC RBC AUTO-ENTMCNC: 33.8 G/DL (ref 31–36)
MCV RBC AUTO: 83.9 FL (ref 80–100)
MONOCYTES ABSOLUTE: 1.5 K/UL (ref 0–1.3)
MONOCYTES RELATIVE PERCENT: 8.7 %
NEUTROPHILS ABSOLUTE: 13.6 K/UL (ref 1.7–7.7)
NEUTROPHILS RELATIVE PERCENT: 80.1 %
PDW BLD-RTO: 14.9 % (ref 12.4–15.4)
PERFORMED ON: ABNORMAL
PLATELET # BLD: 346 K/UL (ref 135–450)
PMV BLD AUTO: 7.3 FL (ref 5–10.5)
POTASSIUM REFLEX MAGNESIUM: 3.4 MMOL/L (ref 3.5–5.1)
RBC # BLD: 4.43 M/UL (ref 4.2–5.9)
SODIUM BLD-SCNC: 140 MMOL/L (ref 136–145)
WBC # BLD: 16.9 K/UL (ref 4–11)

## 2019-06-24 PROCEDURE — 85025 COMPLETE CBC W/AUTO DIFF WBC: CPT

## 2019-06-24 PROCEDURE — 6370000000 HC RX 637 (ALT 250 FOR IP): Performed by: INTERNAL MEDICINE

## 2019-06-24 PROCEDURE — 80048 BASIC METABOLIC PNL TOTAL CA: CPT

## 2019-06-24 PROCEDURE — 36415 COLL VENOUS BLD VENIPUNCTURE: CPT

## 2019-06-24 PROCEDURE — 1200000000 HC SEMI PRIVATE

## 2019-06-24 PROCEDURE — 97535 SELF CARE MNGMENT TRAINING: CPT

## 2019-06-24 PROCEDURE — 2580000003 HC RX 258: Performed by: UROLOGY

## 2019-06-24 PROCEDURE — 2580000003 HC RX 258: Performed by: INTERNAL MEDICINE

## 2019-06-24 PROCEDURE — 97530 THERAPEUTIC ACTIVITIES: CPT

## 2019-06-24 PROCEDURE — 6360000002 HC RX W HCPCS: Performed by: INTERNAL MEDICINE

## 2019-06-24 PROCEDURE — 6360000002 HC RX W HCPCS: Performed by: UROLOGY

## 2019-06-24 PROCEDURE — 97161 PT EVAL LOW COMPLEX 20 MIN: CPT

## 2019-06-24 PROCEDURE — 83735 ASSAY OF MAGNESIUM: CPT

## 2019-06-24 PROCEDURE — 97165 OT EVAL LOW COMPLEX 30 MIN: CPT

## 2019-06-24 PROCEDURE — 97116 GAIT TRAINING THERAPY: CPT

## 2019-06-24 RX ORDER — PREDNISONE 10 MG/1
10 TABLET ORAL DAILY
Status: DISCONTINUED | OUTPATIENT
Start: 2019-06-24 | End: 2019-06-25 | Stop reason: HOSPADM

## 2019-06-24 RX ORDER — INSULIN GLARGINE 100 [IU]/ML
15 INJECTION, SOLUTION SUBCUTANEOUS NIGHTLY
Status: DISCONTINUED | OUTPATIENT
Start: 2019-06-24 | End: 2019-06-25 | Stop reason: HOSPADM

## 2019-06-24 RX ORDER — FUROSEMIDE 20 MG/1
20 TABLET ORAL DAILY
Status: DISCONTINUED | OUTPATIENT
Start: 2019-06-24 | End: 2019-06-25 | Stop reason: HOSPADM

## 2019-06-24 RX ADMIN — LOVASTATIN 20 MG: 10 TABLET ORAL at 21:43

## 2019-06-24 RX ADMIN — INSULIN GLARGINE 15 UNITS: 100 INJECTION, SOLUTION SUBCUTANEOUS at 14:24

## 2019-06-24 RX ADMIN — INSULIN LISPRO 10 UNITS: 100 INJECTION, SOLUTION INTRAVENOUS; SUBCUTANEOUS at 18:49

## 2019-06-24 RX ADMIN — Medication 10 ML: at 22:28

## 2019-06-24 RX ADMIN — PREDNISONE 10 MG: 10 TABLET ORAL at 13:02

## 2019-06-24 RX ADMIN — CEFTRIAXONE 2 G: 2 INJECTION, POWDER, FOR SOLUTION INTRAMUSCULAR; INTRAVENOUS at 11:17

## 2019-06-24 RX ADMIN — PANTOPRAZOLE SODIUM 40 MG: 40 TABLET, DELAYED RELEASE ORAL at 06:17

## 2019-06-24 RX ADMIN — Medication 10 ML: at 08:21

## 2019-06-24 RX ADMIN — POTASSIUM CHLORIDE 40 MEQ: 20 TABLET, EXTENDED RELEASE ORAL at 08:20

## 2019-06-24 RX ADMIN — FUROSEMIDE 20 MG: 20 TABLET ORAL at 13:01

## 2019-06-24 RX ADMIN — INSULIN LISPRO 4 UNITS: 100 INJECTION, SOLUTION INTRAVENOUS; SUBCUTANEOUS at 08:21

## 2019-06-24 RX ADMIN — AMLODIPINE BESYLATE 10 MG: 10 TABLET ORAL at 08:20

## 2019-06-24 RX ADMIN — INSULIN LISPRO 12 UNITS: 100 INJECTION, SOLUTION INTRAVENOUS; SUBCUTANEOUS at 12:31

## 2019-06-24 RX ADMIN — INSULIN LISPRO 6 UNITS: 100 INJECTION, SOLUTION INTRAVENOUS; SUBCUTANEOUS at 21:43

## 2019-06-24 RX ADMIN — MULTIPLE VITAMINS W/ MINERALS TAB 1 TABLET: TAB at 08:20

## 2019-06-24 RX ADMIN — FUROSEMIDE 20 MG: 10 INJECTION, SOLUTION INTRAMUSCULAR; INTRAVENOUS at 08:19

## 2019-06-24 RX ADMIN — OXYCODONE HYDROCHLORIDE AND ACETAMINOPHEN 1000 MG: 500 TABLET ORAL at 08:20

## 2019-06-24 RX ADMIN — Medication 1 CAPSULE: at 08:20

## 2019-06-24 RX ADMIN — TAMSULOSIN HYDROCHLORIDE 0.4 MG: 0.4 CAPSULE ORAL at 08:20

## 2019-06-24 RX ADMIN — HYDROCODONE BITARTRATE AND ACETAMINOPHEN 2 TABLET: 5; 325 TABLET ORAL at 13:02

## 2019-06-24 RX ADMIN — MAGNESIUM HYDROXIDE 30 ML: 400 SUSPENSION ORAL at 08:40

## 2019-06-24 ASSESSMENT — PAIN DESCRIPTION - FREQUENCY
FREQUENCY: INTERMITTENT
FREQUENCY: CONTINUOUS

## 2019-06-24 ASSESSMENT — PAIN DESCRIPTION - DESCRIPTORS
DESCRIPTORS: THROBBING
DESCRIPTORS: THROBBING

## 2019-06-24 ASSESSMENT — PAIN DESCRIPTION - LOCATION
LOCATION: GROIN;FOOT
LOCATION: GROIN;FOOT

## 2019-06-24 ASSESSMENT — PAIN DESCRIPTION - PROGRESSION
CLINICAL_PROGRESSION: NOT CHANGED
CLINICAL_PROGRESSION: GRADUALLY IMPROVING

## 2019-06-24 ASSESSMENT — PAIN DESCRIPTION - ONSET
ONSET: ON-GOING
ONSET: GRADUAL

## 2019-06-24 ASSESSMENT — PAIN SCALES - GENERAL
PAINLEVEL_OUTOF10: 8
PAINLEVEL_OUTOF10: 0
PAINLEVEL_OUTOF10: 5

## 2019-06-24 ASSESSMENT — PAIN DESCRIPTION - PAIN TYPE
TYPE: ACUTE PAIN
TYPE: ACUTE PAIN

## 2019-06-24 NOTE — PROGRESS NOTES
Occupational Therapy   Occupational Therapy Initial Assessment  Date: 2019   Patient Name: Kelley Weathers  MRN: [id number]     : 1955    Date of Service: 2019    Discharge Recommendations:  Home with assist PRN       Assessment   Performance deficits / Impairments: Decreased functional mobility ; Decreased endurance;Decreased balance;Decreased ADL status  Assessment: Pt admitted with Left ureterolithiasis and is s/p stent placement. Prior to admission, pt lived at home with family. He was independent with ADLs and fxl mobility. Pt now below this baseline, being most limited by decreased balance and activity tolerance. He was able to perform fxl mobility/transfers and ADLs at [facility] this date. Pt will benefit from continued therapy while in the hospital in order to maximize function. Anticipate he will be safe to d/c home with assist prn. Prognosis: Good  Decision Making: Low Complexity  History: Pt is from home where he lives with family. He was independent with ADLs and mobility. Now admitted with Left ureterolithiasis and is s/p stent placement  Exam: decreased activity tolerance, decreased balance  Assistance / Modification: CGA fxl transfers/mobility, CGA grooming/toileting. PMH includes: DMII  Patient Education: Role of OT, POC  REQUIRES OT FOLLOW UP: Yes  Activity Tolerance  Activity Tolerance: Patient Tolerated treatment well  Safety Devices  Safety Devices in place: Yes  Type of devices: Call light within reach; Chair alarm in place;Gait belt;Left in chair           Patient Diagnosis(es): The primary encounter diagnosis was Ureterolithiasis. A diagnosis of Sepsis, due to unspecified organism [facility]) was also pertinent to this visit. has a past medical history of Diabetes mellitus ([facility] [address] High blood pressure, and Urinary stone. has a past surgical history that includes Appendectomy (1984) and Cystoscopy (Left, 2019). Restrictions  Restrictions/Precautions  Restrictions/Precautions: Fall Risk    Subjective   General  Chart Reviewed: Yes  Patient assessed for rehabilitation services?: Yes  Additional Pertinent Hx: Pt is a 70 y.o. Male admitted with left abdominal pain, left testicular pain. CT scan with 7mm stone at UVJ with mild hydro, separate 9mm stone in bladder. He underwent left ureteral stent placement with Dr. Tari Hernandez 6/18/19. Diagnosed with Left ureterolithiasis and sepsis. PMH includes: DM.  Family / Caregiver Present: Yes(wife)  Referring Practitioner: Shante Estevez MD  Diagnosis: Left ureterolithiasis  Subjective  Subjective: Pt met bedside for OT eval/tx with PT. Pt seated in bedside chair upon entering, pleasant and agreeable to therapy. Reports 3/10 headache. Social/Functional History  Social/Functional History  Lives With: Family(daughter, son in law, wife)  Type of Home: House  Home Layout: Multi-level, Able to Live on Main level with bedroom/bathroom(Trilevel)  Home Access: Stairs to enter without rails  Entrance Stairs - Number of Steps: 1 JAKOB  Bathroom Shower/Tub: Tub/Shower unit  Bathroom Toilet: Standard  ADL Assistance: Independent  Homemaking Assistance: Independent  Homemaking Responsibilities: Yes  Ambulation Assistance: Independent  Transfer Assistance: Independent  Active : Yes  Occupation: Retired     Objective   Vision: Within Functional Limits  Hearing: Within functional limits      Orientation  Overall Orientation Status: Within Functional Limits     Balance  Sitting Balance: Supervision  Standing Balance: Contact guard assistance    Standing Balance  Activity: hand washing at sink  Comment: CGA    Functional Mobility  Functional - Mobility Device: No device  Activity: To/from bathroom; Other  Assist Level: Contact guard assistance  Functional Mobility Comments: Pt performed fxl mobility in room, hallway and bathroom without AD and CGA.  Pt did use handrail in hallway for support and was

## 2019-06-24 NOTE — PROGRESS NOTES
Performed on ACCU-CHEK    Basic Metabolic Panel w/ Reflex to MG    Collection Time: 06/24/19  6:59 AM   Result Value Ref Range    Sodium 140 136 - 145 mmol/L    Potassium reflex Magnesium 3.4 (L) 3.5 - 5.1 mmol/L    Chloride 94 (L) 99 - 110 mmol/L    CO2 31 21 - 32 mmol/L    Anion Gap 15 3 - 16    Glucose 244 (H) 70 - 99 mg/dL    BUN 20 7 - 20 mg/dL    CREATININE 1.1 0.8 - 1.3 mg/dL    GFR Non-African American >60 >60    GFR African American >60 >60    Calcium 9.8 8.3 - 10.6 mg/dL   Magnesium    Collection Time: 06/24/19  6:59 AM   Result Value Ref Range    Magnesium 1.90 1.80 - 2.40 mg/dL   CBC auto differential    Collection Time: 06/24/19  6:59 AM   Result Value Ref Range    WBC 16.9 (H) 4.0 - 11.0 K/uL    RBC 4.43 4.20 - 5.90 M/uL    Hemoglobin 12.6 (L) 13.5 - 17.5 g/dL    Hematocrit 37.2 (L) 40.5 - 52.5 %    MCV 83.9 80.0 - 100.0 fL    MCH 28.4 26.0 - 34.0 pg    MCHC 33.8 31.0 - 36.0 g/dL    RDW 14.9 12.4 - 15.4 %    Platelets 636 472 - 517 K/uL    MPV 7.3 5.0 - 10.5 fL    Neutrophils % 80.1 %    Lymphocytes % 8.7 %    Monocytes % 8.7 %    Eosinophils % 2.0 %    Basophils % 0.5 %    Neutrophils # 13.6 (H) 1.7 - 7.7 K/uL    Lymphocytes # 1.5 1.0 - 5.1 K/uL    Monocytes # 1.5 (H) 0.0 - 1.3 K/uL    Eosinophils # 0.3 0.0 - 0.6 K/uL    Basophils # 0.1 0.0 - 0.2 K/uL   POCT Glucose    Collection Time: 06/24/19  8:04 AM   Result Value Ref Range    POC Glucose 246 (H) 70 - 99 mg/dl    Performed on ACCU-CHEK    POCT Glucose    Collection Time: 06/24/19 11:58 AM   Result Value Ref Range    POC Glucose 446 (H) 70 - 99 mg/dl    Performed on ACCU-CHEK        -----------------------------------------------------------------  Imaging  Us Scrotum And Testicles    Result Date: 6/18/2019  EXAMINATION: ULTRASOUND OF THE SCROTUM/TESTICLES WITH COLOR DOPPLER FLOW EVALUATION 6/18/2019 COMPARISON: None.  HISTORY: ORDERING SYSTEM PROVIDED HISTORY: testicular pain x 4-5 days, fever FINDINGS: Measurements: Right testicle: 3.6 x 3.3 x 2.5 cm. Left testicle: 4.4 x 3.1 x 2.7 cm Right: Grey scale: The right testicle demonstrates normal homogeneous echotexture without focal lesion. No evidence of testicular microlithiasis. Doppler Evaluation:  There is normal arterial and venous Doppler flow within the testicle. Scrotal Sac:  Small hydrocele. Epididymis:  No acute abnormality. Left: Grey scale: The left testicle demonstrates normal homogeneous echotexture without focal lesion. No evidence of testicular microlithiasis. Doppler Evaluation:  There is normal arterial and venous Doppler flow within the testicle. Scrotal Sac: Moderate sized hydrocele Epididymis:  No acute abnormality. Small right-sided and moderate left-sided hydrocele. No evidence of torsion, orchitis or epididymitis     Ct Abdomen Pelvis W Iv Contrast Additional Contrast? None    Result Date: 6/18/2019  EXAMINATION: CT OF THE ABDOMEN AND PELVIS WITH CONTRAST 6/18/2019 2:58 pm TECHNIQUE: CT of the abdomen and pelvis was performed with the administration of intravenous contrast. Multiplanar reformatted images are provided for review. Dose modulation, iterative reconstruction, and/or weight based adjustment of the mA/kV was utilized to reduce the radiation dose to as low as reasonably achievable. COMPARISON: CT abdomen and pelvis September 22, 2009 HISTORY: ORDERING SYSTEM PROVIDED HISTORY: lower abd pain radiating to scrotum (scrotal US normal), fever,tachycardic TECHNOLOGIST PROVIDED HISTORY: If patient is on cardiac monitor and/or pulse ox, they may be taken off cardiac monitor and pulse ox, left on O2 if currently on. All monitors reattached when patient returns to room. Additional Contrast?->None Ordering Physician Provided Reason for Exam: Abdominal Pain Acuity: Acute Type of Exam: Initial FINDINGS: Lower Chest: No evidence of pneumonia or other acute findings. Organs: There is a left-sided stone at the UPJ measuring 7 mm. There is mild left-sided hydronephrosis.   Small stones elsewhere in the kidneys measuring 4 mm and less in size. No evidence of pyelonephritis or cholecystitis. A small 4 mm gallstone is noted. Subcentimeter low-density lesions in the liver are too small to accurately characterize but most likely cysts or hemangiomata. There are multiple cysts in the liver. Higher density lesions measuring up to 2.7 cm are indeterminate but may be high-density cysts. GI/Bowel: No bowel obstruction or other acute process demonstrated. No evidence of colitis, diverticulitis or appendicitis. There is scattered colonic diverticulosis. Pelvis: In the lumen of the bladder there is a stone measuring 9 mm. Prostate gland is quite enlarged, mildly increased in size from the remote comparison. This causes impression upon the base the bladder. Normal distention of the bladder. Peritoneum/Retroperitoneum: No free air, free fluid or abscess. Small fat containing umbilical hernia. Bones/Soft Tissues: No fracture or other acute osseous process. Advanced degenerative disc disease changes at L4-5 and L5-S1     7 mm stone at the left UVJ causing mild hydronephrosis. There is a separate renal 9 mm stone in the lumen of the bladder. Other nonacute findings as discussed         Objective:   Vitals: BP (!) 150/86   Pulse 111   Temp 98.1 °F (36.7 °C) (Oral)   Resp 18   Ht 5' 7\" (1.702 m)   Wt 173 lb 1 oz (78.5 kg)   SpO2 90%   BMI 27.11 kg/m²   General appearance: Laying in bed  Skin: Warm  HEENT: Head: Normocephalic, no lesions, without obvious abnormality.   Neck: no adenopathy, no carotid bruit, no JVD, supple, symmetrical, trachea midline and thyroid not enlarged, symmetric, no tenderness/mass/nodules  Lungs: Bibasilar rales  Heart: regular rate and rhythm  Abdomen: soft, non-tender; bowel sounds normal; no masses,  no organomegaly  Extremities: extremities normal, atraumatic, no cyanosis or edema  Neurologic: Mental status: Alert, oriented, thought content appropriate      Assessment/Plan: Patient Active Problem List:       Left ureterolithiasis: Reason for sepsis. Noted to be 7 mm at the left UVJ junction causing hydronephrosis. -s/p CYSTOSCOPY LEFT URETERAL STENT INSERTION on 6/18/19   -Pain control   -Antibiotics-rocephin increased today to 2 g daily   -Urology consulted    - outpt ESWL      Acute pyelonephritis: Complicated and due to ureterolithiasis. -Antibiotics rocephin   -Urine culture + Strep agalacticae        Severe sepsis: Improving. Attributed to urinary tract infection. Met criteria on presentation with fever, WBC and encephalopathy.   -Antibiotics    Acute hypoxic respiratory failure-  Improved. Likely chf  Start lasix iv x 1    Acute encephalopathy: Improved. Metabolic and due to sepsis. As per spouse, patient was confused. -Expect improvement with resolution of underlining infection   -Neurochecks   -Supportive care        Diabetes mellitus type 2: Controlled as last available hemoglobin A1c being 7.6 on 5/16/2019.   -Sliding scale insulin   -Accu-Cheks   -Plan to resume home regimen on discharge        Essential hypertension: Controlled.    -Continue Norvasc   -Hold lisinopril for now   -PRN hydralazine      Electronically signed by Venkata Lopez MD on 6/24/2019 at 12:41 PM

## 2019-06-24 NOTE — PROGRESS NOTES
Pt Name: Hawa Estes  Medical Record Number: [id number]  Date of Birth 1955   Today's Date: 6/24/2019      Subjective:  Awake in chair, Overall continues to feel better. Left testicle still very tender, swelling improved. Voiding appropriately, last PVR 130cc. Having dysuria - r/t stent. ROS: Constitutional: No fever    Vitals:  Vitals:    06/24/19 0215 06/24/19 0510 06/24/19 0514 06/24/19 0819   BP: 134/87  (!) 143/75 (!) 150/86   Pulse: 102  104 111   Resp: 18 18 18   Temp: 98.1 °F (36.7 °C)  99 °F (37.2 °C) 98.1 °F (36.7 °C)   TempSrc: Oral  Oral Oral   SpO2: 93%  93% 90%   Weight:  173 lb 1 oz (78.5 kg)     Height:         I/O last 3 completed shifts:   In: 478 [P.O.:478]  Out: 1950 [Urine:1950]    Exam:  General: Awake, oriented, no acute distress  Respiratory: Nonlabored breathing  Abdomen: Soft, non-tender, non-distended, no masses  : spontaneously voiding, left testicle tender - swelling slightly improving  Skin: Skin color, texture, turgor normal, no rashes or lesions  Neurologic: no gross deficits    CURRENT MEDICATIONS   Scheduled Meds:   furosemide  20 mg Intravenous TID    cefTRIAXone (ROCEPHIN) IV  2 g Intravenous Q24H    amLODIPine  10 mg Oral Daily    lovastatin  20 mg Oral Nightly    pantoprazole  40 mg Oral QAM AC    therapeutic multivitamin-minerals  1 tablet Oral Daily    tamsulosin  0.4 mg Oral Daily    vitamin C  1,000 mg Oral Daily    lactobacillus  1 capsule Oral Daily with breakfast    sodium chloride flush  10 mL Intravenous 2 times per day    insulin lispro  0-12 Units Subcutaneous TID WC    insulin lispro  0-6 Units Subcutaneous Nightly    sodium chloride  1,000 mL Intravenous Once     Continuous Infusions:   dextrose       PRN Meds:.potassium chloride **OR** potassium alternative oral replacement **OR** potassium chloride, magnesium sulfate, magnesium hydroxide, ondansetron, famotidine, nicotine, acetaminophen, HYDROcodone 5 mg - acetaminophen **OR** HYDROcodone 5 mg - acetaminophen, morphine **OR** morphine, sodium chloride flush, glucose, dextrose, glucagon (rDNA), dextrose    LABS     Recent Labs     06/22/19  0530 06/23/19  0529 06/24/19  0659   WBC 14.6* 16.0* 16.9*   HGB 10.9* 11.5* 12.6*   HCT 31.8* 34.6* 37.2*    287 346    138 140   K 3.6 3.5 3.4*    96* 94*   CO2 28 27 31   BUN 11 12 20   CREATININE 1.1 1.2 1.1   MG 1.60* 1.80 1.90   CALCIUM 8.8 9.2 9.8       ASSESSMENT   1. Hospital day # 6   2. 63y.o. Male with left abdominal pain, left testicular pain. CT scan with 7mm stone at UVJ with mild hydro, separate 9mm stone in bladder. 3. Sepsis  4. S/p left ureteral stent placement with Dr. Alberto Francois 6/18/19. 5. Previous history of urinary retention (2yrs ago), reports has been ok since taking Flomax. 6. Epidymo-orchitis with skin fixation  PLAN   1. KUB shows stone, will plan for outpatient ESWL  2. urine cultures, Strep agalacticae - on Rocephin  3. PVR appropriate Continue flomax  4. Elevate and ice scrotum   5. Definitive stone treatment in 2 weeks, ESWL.       SERGIO Shaw - CNP 6/24/2019 8:32 AM

## 2019-06-24 NOTE — PLAN OF CARE
Patient taking Norco prn pain. Pt uses call light appropriately. While in bed side rails up 3/4. Call light in reach. Non skid footwear in use Alarm on. While in Chair, call light in reach, non skid footwear and alarms on. Checking on patient Q2h for nutritional needs, hygiene needs, comfort measures, mobility, fall risk interventions and safe environment. All precautions and interventions in place . Patient's skin intact. Patient voiding per urinal without difficulty. Will continue to monitor.

## 2019-06-24 NOTE — CARE COORDINATION
Chart reviewed. PT/OT ordered, are recommending home with PRN assist.     Plan: Return home with spouse, no needs identified.      Jaspal Bills, [address] [facility]  [phone]  6/24/19 at 10:48 AM

## 2019-06-24 NOTE — PROGRESS NOTES
Patient resting in bed. Sleeping without s/s of distress. Wife at bedside. Patient voiding clear yellow urine. Patient using ice on scrotal swelling periodically. No more than 20 minutes usage per hour. All safety precautions in  Place including bed alarm. Will continue to monitor.

## 2019-06-24 NOTE — PROGRESS NOTES
Hospital Medicine    Progress Note    Admit Date: 2019    PCP: Ge Jean MD                  : 1955  MRN: [id number]    Subjective: Interval History no fever  Still sob. Diet: DIET CARB CONTROL; Dietary Nutrition Supplements: Standard High Calorie Oral Supplement      Data:   Scheduled Meds:   furosemide  20 mg Intravenous TID    cefTRIAXone (ROCEPHIN) IV  2 g Intravenous Q24H    amLODIPine  10 mg Oral Daily    lovastatin  20 mg Oral Nightly    pantoprazole  40 mg Oral QAM AC    therapeutic multivitamin-minerals  1 tablet Oral Daily    tamsulosin  0.4 mg Oral Daily    vitamin C  1,000 mg Oral Daily    lactobacillus  1 capsule Oral Daily with breakfast    sodium chloride flush  10 mL Intravenous 2 times per day    insulin lispro  0-12 Units Subcutaneous TID WC    insulin lispro  0-6 Units Subcutaneous Nightly    sodium chloride  1,000 mL Intravenous Once     Continuous Infusions:   dextrose       PRN Meds:potassium chloride **OR** potassium alternative oral replacement **OR** potassium chloride, magnesium sulfate, magnesium hydroxide, ondansetron, famotidine, nicotine, acetaminophen, HYDROcodone 5 mg - acetaminophen **OR** HYDROcodone 5 mg - acetaminophen, morphine **OR** morphine, sodium chloride flush, glucose, dextrose, glucagon (rDNA), dextrose  I/O last 3 completed shifts: In: 358 [P.O.:358]  Out: 2225 [Urine:2225]  No intake/output data recorded.     Intake/Output Summary (Last 24 hours) at 2019 2312  Last data filed at 2019 2033  Gross per 24 hour   Intake 358 ml   Output 2225 ml   Net -1867 ml         All labs past 24 hours:   Recent Results (from the past 24 hour(s))   Basic Metabolic Panel w/ Reflex to MG    Collection Time: 19  5:29 AM   Result Value Ref Range    Sodium 138 136 - 145 mmol/L    Potassium reflex Magnesium 3.5 3.5 - 5.1 mmol/L    Chloride 96 (L) 99 - 110 mmol/L    CO2 27 21 - 32 mmol/L    Anion Gap 15 3 - 16    Glucose 276 (H) 70 - 99 mg/dL    BUN 12 7 - 20 mg/dL    CREATININE 1.2 0.8 - 1.3 mg/dL    GFR Non-African American >60 >60    GFR African American >60 >60    Calcium 9.2 8.3 - 10.6 mg/dL   Magnesium    Collection Time: 06/23/19  5:29 AM   Result Value Ref Range    Magnesium 1.80 1.80 - 2.40 mg/dL   CBC auto differential    Collection Time: 06/23/19  5:29 AM   Result Value Ref Range    WBC 16.0 (H) 4.0 - 11.0 K/uL    RBC 4.09 (L) 4.20 - 5.90 M/uL    Hemoglobin 11.5 (L) 13.5 - 17.5 g/dL    Hematocrit 34.6 (L) 40.5 - 52.5 %    MCV 84.5 80.0 - 100.0 fL    MCH 28.1 26.0 - 34.0 pg    MCHC 33.3 31.0 - 36.0 g/dL    RDW 15.0 12.4 - 15.4 %    Platelets 372 771 - 576 K/uL    MPV 7.1 5.0 - 10.5 fL    Neutrophils % 80.8 %    Lymphocytes % 8.4 %    Monocytes % 8.6 %    Eosinophils % 1.9 %    Basophils % 0.3 %    Neutrophils # 13.0 (H) 1.7 - 7.7 K/uL    Lymphocytes # 1.4 1.0 - 5.1 K/uL    Monocytes # 1.4 (H) 0.0 - 1.3 K/uL    Eosinophils # 0.3 0.0 - 0.6 K/uL    Basophils # 0.0 0.0 - 0.2 K/uL   POCT Glucose    Collection Time: 06/23/19  7:49 AM   Result Value Ref Range    POC Glucose 218 (H) 70 - 99 mg/dl    Performed on ACCU-CHEK    POCT Glucose    Collection Time: 06/23/19 12:15 PM   Result Value Ref Range    POC Glucose 352 (H) 70 - 99 mg/dl    Performed on ACCU-CHEK    POCT Glucose    Collection Time: 06/23/19  4:46 PM   Result Value Ref Range    POC Glucose 282 (H) 70 - 99 mg/dl    Performed on ACCU-CHEK    POCT Glucose    Collection Time: 06/23/19  9:32 PM   Result Value Ref Range    POC Glucose 300 (H) 70 - 99 mg/dl    Performed on ACCU-CHEK        -----------------------------------------------------------------  Imaging  Us Scrotum And Testicles    Result Date: 6/18/2019  EXAMINATION: ULTRASOUND OF THE SCROTUM/TESTICLES WITH COLOR DOPPLER FLOW EVALUATION 6/18/2019 COMPARISON: None. HISTORY: ORDERING SYSTEM PROVIDED HISTORY: testicular pain x 4-5 days, fever FINDINGS: Measurements: Right testicle: 3.6 x 3.3 x 2.5 cm.  Left testicle: 4.4 x 3.1 x 2.7 cm Right: Grey scale: The right testicle demonstrates normal homogeneous echotexture without focal lesion. No evidence of testicular microlithiasis. Doppler Evaluation:  There is normal arterial and venous Doppler flow within the testicle. Scrotal Sac:  Small hydrocele. Epididymis:  No acute abnormality. Left: Grey scale: The left testicle demonstrates normal homogeneous echotexture without focal lesion. No evidence of testicular microlithiasis. Doppler Evaluation:  There is normal arterial and venous Doppler flow within the testicle. Scrotal Sac: Moderate sized hydrocele Epididymis:  No acute abnormality. Small right-sided and moderate left-sided hydrocele. No evidence of torsion, orchitis or epididymitis     Ct Abdomen Pelvis W Iv Contrast Additional Contrast? None    Result Date: 6/18/2019  EXAMINATION: CT OF THE ABDOMEN AND PELVIS WITH CONTRAST 6/18/2019 2:58 pm TECHNIQUE: CT of the abdomen and pelvis was performed with the administration of intravenous contrast. Multiplanar reformatted images are provided for review. Dose modulation, iterative reconstruction, and/or weight based adjustment of the mA/kV was utilized to reduce the radiation dose to as low as reasonably achievable. COMPARISON: CT abdomen and pelvis September 22, 2009 HISTORY: ORDERING SYSTEM PROVIDED HISTORY: lower abd pain radiating to scrotum (scrotal US normal), fever,tachycardic TECHNOLOGIST PROVIDED HISTORY: If patient is on cardiac monitor and/or pulse ox, they may be taken off cardiac monitor and pulse ox, left on O2 if currently on. All monitors reattached when patient returns to room. Additional Contrast?->None Ordering Physician Provided Reason for Exam: Abdominal Pain Acuity: Acute Type of Exam: Initial FINDINGS: Lower Chest: No evidence of pneumonia or other acute findings. Organs: There is a left-sided stone at the UPJ measuring 7 mm. There is mild left-sided hydronephrosis.   Small stones elsewhere in the kidneys measuring 4 mm and less in size. No evidence of pyelonephritis or cholecystitis. A small 4 mm gallstone is noted. Subcentimeter low-density lesions in the liver are too small to accurately characterize but most likely cysts or hemangiomata. There are multiple cysts in the liver. Higher density lesions measuring up to 2.7 cm are indeterminate but may be high-density cysts. GI/Bowel: No bowel obstruction or other acute process demonstrated. No evidence of colitis, diverticulitis or appendicitis. There is scattered colonic diverticulosis. Pelvis: In the lumen of the bladder there is a stone measuring 9 mm. Prostate gland is quite enlarged, mildly increased in size from the remote comparison. This causes impression upon the base the bladder. Normal distention of the bladder. Peritoneum/Retroperitoneum: No free air, free fluid or abscess. Small fat containing umbilical hernia. Bones/Soft Tissues: No fracture or other acute osseous process. Advanced degenerative disc disease changes at L4-5 and L5-S1     7 mm stone at the left UVJ causing mild hydronephrosis. There is a separate renal 9 mm stone in the lumen of the bladder. Other nonacute findings as discussed         Objective:   Vitals: BP (!) 149/85   Pulse 114   Temp 99.7 °F (37.6 °C) (Oral)   Resp 16   Ht 5' 7\" (1.702 m)   Wt 177 lb 0.5 oz (80.3 kg)   SpO2 91%   BMI 27.73 kg/m²   General appearance: Laying in bed  Skin: Warm  HEENT: Head: Normocephalic, no lesions, without obvious abnormality.   Neck: no adenopathy, no carotid bruit, no JVD, supple, symmetrical, trachea midline and thyroid not enlarged, symmetric, no tenderness/mass/nodules  Lungs: Bibasilar rales  Heart: regular rate and rhythm  Abdomen: soft, non-tender; bowel sounds normal; no masses,  no organomegaly  Extremities: extremities normal, atraumatic, no cyanosis or edema  Neurologic: Mental status: Alert, oriented, thought content appropriate      Assessment/Plan:   Patient Active Problem List:       Left ureterolithiasis: Reason for sepsis. Noted to be 7 mm at the left UVJ junction causing hydronephrosis. -s/p CYSTOSCOPY LEFT URETERAL STENT INSERTION on 6/18/19   -Pain control   -Antibiotics-rocephin increased today to 2 g daily   -Urology consulted    - outpt ESWL      Acute pyelonephritis: Complicated and due to ureterolithiasis. -Antibiotics rocephin   -Urine culture + Strep agalacticae        Severe sepsis: Improving. Attributed to urinary tract infection. Met criteria on presentation with fever, WBC and encephalopathy.   -Antibiotics    Acute hypoxic respiratory failure-  Improved. Likely chf  Start lasix iv x 1    Acute encephalopathy: Improved. Metabolic and due to sepsis. As per spouse, patient was confused. -Expect improvement with resolution of underlining infection   -Neurochecks   -Supportive care        Diabetes mellitus type 2: Controlled as last available hemoglobin A1c being 7.6 on 5/16/2019.   -Sliding scale insulin   -Accu-Cheks   -Plan to resume home regimen on discharge        Essential hypertension: Controlled.    -Continue Norvasc   -Hold lisinopril for now   -PRN hydralazine      Electronically signed by Theodore Sheehan MD on 6/23/2019 at 11:12 PM

## 2019-06-24 NOTE — PROGRESS NOTES
Orientation  Orientation  Overall Orientation Status: Within Functional Limits  Social/Functional History  Social/Functional History  Lives With: Family(daughter, son in law, wife)  Type of Home: House  Home Layout: Multi-level, Able to Live on Main level with bedroom/bathroom(Trilevel)  Home Access: Stairs to enter without rails  Entrance Stairs - Number of Steps: 1 JAKOB  Bathroom Shower/Tub: Tub/Shower unit  Bathroom Toilet: Standard  ADL Assistance: Independent  Homemaking Assistance: Independent  Homemaking Responsibilities: Yes  Ambulation Assistance: Independent  Transfer Assistance: Independent  Active : Yes  Occupation: Retired  Objective  AROM RLE (degrees)  RLE AROM: WFL  AROM LLE (degrees)  LLE AROM : WFL  Strength RLE  Strength RLE: WFL  Strength LLE  Strength LLE: WFL     Sensation  Overall Sensation Status: WFL  Bed mobility  Supine to Sit: Unable to assess  Sit to Supine: Unable to assess  Comment: up in chair upon arrival and exit  Transfers  Sit to Stand: Contact guard assistance  Stand to sit: Contact guard assistance  Ambulation  Ambulation?: Yes  Ambulation 1  Surface: level tile  Device: No Device; Farrell rail(reaching for furniture in room to steady self, farrell rail in hallway)  Assistance: Contact guard assistance  Quality of Gait: partial step through gait pattern, decreased foot clearance bilaterally, unsteady with decreased tanner  Distance: 90'  Stairs/Curb  Stairs?: No     Balance  Sitting - Static: Good  Sitting - Dynamic: Good  Standing - Static: Good;-  Standing - Dynamic: Fair;+      Plan   Plan  Times per week: 3-5x/wk  Current Treatment Recommendations: Functional Mobility Training, Transfer Training, Gait Training, Stair training, Patient/Caregiver Education & Training  Safety Devices  Type of devices:  All fall risk precautions in place, Call light within reach, Chair alarm in place, Gait belt, Patient at risk for falls, Left in chair  AM-PAC Score  AM-PAC Inpatient Mobility Raw Score : 19 (06/24/19 0957)  AM-PAC Inpatient T-Scale Score : 45.44 (06/24/19 0957)  Mobility Inpatient CMS 0-100% Score: 41.77 (06/24/19 0957)  Mobility Inpatient CMS G-Code Modifier : CK (06/24/19 0957)        Goals  Short term goals  Time Frame for Short term goals: 5 days  Short term goal 1: Pt will perform bed mobility mod I  Short term goal 2: Pt will perform transfers mod I  Short term goal 3: Pt will ambulate 150' without device mod I  Short term goal 4: Pt will negotiate 4 steps with HR and supervision  Patient Goals   Patient goals : \"to get moving better\"     Therapy Time   Individual Concurrent Group Co-treatment   Time In 0915         Time Out 4489         Minutes 40         Timed Code Treatment Minutes: [place] [address], [place] [id number]

## 2019-06-25 VITALS
BODY MASS INDEX: 27.02 KG/M2 | HEIGHT: 67 IN | HEART RATE: 103 BPM | SYSTOLIC BLOOD PRESSURE: 133 MMHG | WEIGHT: 172.18 LBS | RESPIRATION RATE: 20 BRPM | TEMPERATURE: 98.3 F | OXYGEN SATURATION: 91 % | DIASTOLIC BLOOD PRESSURE: 87 MMHG

## 2019-06-25 LAB
ANION GAP SERPL CALCULATED.3IONS-SCNC: 16 MMOL/L (ref 3–16)
BASOPHILS ABSOLUTE: 0 K/UL (ref 0–0.2)
BASOPHILS RELATIVE PERCENT: 0.3 %
BUN BLDV-MCNC: 29 MG/DL (ref 7–20)
CALCIUM SERPL-MCNC: 9.6 MG/DL (ref 8.3–10.6)
CHLORIDE BLD-SCNC: 93 MMOL/L (ref 99–110)
CO2: 32 MMOL/L (ref 21–32)
CREAT SERPL-MCNC: 1.4 MG/DL (ref 0.8–1.3)
EOSINOPHILS ABSOLUTE: 0.3 K/UL (ref 0–0.6)
EOSINOPHILS RELATIVE PERCENT: 1.7 %
GFR AFRICAN AMERICAN: >60
GFR NON-AFRICAN AMERICAN: 51
GLUCOSE BLD-MCNC: 254 MG/DL (ref 70–99)
GLUCOSE BLD-MCNC: 258 MG/DL (ref 70–99)
GLUCOSE BLD-MCNC: 403 MG/DL (ref 70–99)
HCT VFR BLD CALC: 35.3 % (ref 40.5–52.5)
HEMOGLOBIN: 12 G/DL (ref 13.5–17.5)
LYMPHOCYTES ABSOLUTE: 1.5 K/UL (ref 1–5.1)
LYMPHOCYTES RELATIVE PERCENT: 9.2 %
MAGNESIUM: 2.2 MG/DL (ref 1.8–2.4)
MCH RBC QN AUTO: 28.8 PG (ref 26–34)
MCHC RBC AUTO-ENTMCNC: 34 G/DL (ref 31–36)
MCV RBC AUTO: 84.7 FL (ref 80–100)
MONOCYTES ABSOLUTE: 1.3 K/UL (ref 0–1.3)
MONOCYTES RELATIVE PERCENT: 8.1 %
NEUTROPHILS ABSOLUTE: 12.8 K/UL (ref 1.7–7.7)
NEUTROPHILS RELATIVE PERCENT: 80.7 %
PDW BLD-RTO: 14.8 % (ref 12.4–15.4)
PERFORMED ON: ABNORMAL
PERFORMED ON: ABNORMAL
PLATELET # BLD: 391 K/UL (ref 135–450)
PMV BLD AUTO: 7.1 FL (ref 5–10.5)
POTASSIUM REFLEX MAGNESIUM: 3.8 MMOL/L (ref 3.5–5.1)
RBC # BLD: 4.17 M/UL (ref 4.2–5.9)
SODIUM BLD-SCNC: 141 MMOL/L (ref 136–145)
WBC # BLD: 15.8 K/UL (ref 4–11)

## 2019-06-25 PROCEDURE — 2580000003 HC RX 258: Performed by: INTERNAL MEDICINE

## 2019-06-25 PROCEDURE — 6370000000 HC RX 637 (ALT 250 FOR IP): Performed by: INTERNAL MEDICINE

## 2019-06-25 PROCEDURE — 2580000003 HC RX 258: Performed by: UROLOGY

## 2019-06-25 PROCEDURE — 36415 COLL VENOUS BLD VENIPUNCTURE: CPT

## 2019-06-25 PROCEDURE — 85025 COMPLETE CBC W/AUTO DIFF WBC: CPT

## 2019-06-25 PROCEDURE — 6360000002 HC RX W HCPCS: Performed by: UROLOGY

## 2019-06-25 PROCEDURE — 83735 ASSAY OF MAGNESIUM: CPT

## 2019-06-25 PROCEDURE — 80048 BASIC METABOLIC PNL TOTAL CA: CPT

## 2019-06-25 RX ORDER — NICOTINE 21 MG/24HR
1 PATCH, TRANSDERMAL 24 HOURS TRANSDERMAL DAILY PRN
Qty: 30 PATCH | Refills: 3 | Status: SHIPPED | OUTPATIENT
Start: 2019-06-25 | End: 2019-07-01

## 2019-06-25 RX ORDER — PREDNISONE 10 MG/1
10 TABLET ORAL DAILY
Qty: 3 TABLET | Refills: 0 | Status: SHIPPED | OUTPATIENT
Start: 2019-06-26 | End: 2019-06-29

## 2019-06-25 RX ORDER — LACTOBACILLUS RHAMNOSUS GG 10B CELL
1 CAPSULE ORAL 2 TIMES DAILY
Qty: 14 CAPSULE | Refills: 0 | Status: SHIPPED | OUTPATIENT
Start: 2019-06-25 | End: 2019-07-02

## 2019-06-25 RX ORDER — CEFUROXIME AXETIL 500 MG/1
500 TABLET ORAL 2 TIMES DAILY
Qty: 14 TABLET | Refills: 0 | Status: SHIPPED | OUTPATIENT
Start: 2019-06-25 | End: 2019-07-02

## 2019-06-25 RX ORDER — HYDROCODONE BITARTRATE AND ACETAMINOPHEN 5; 325 MG/1; MG/1
1 TABLET ORAL EVERY 6 HOURS PRN
Qty: 20 TABLET | Refills: 0 | Status: SHIPPED | OUTPATIENT
Start: 2019-06-25 | End: 2019-06-30

## 2019-06-25 RX ORDER — GLIPIZIDE 5 MG/1
5 TABLET ORAL
Qty: 60 TABLET | Refills: 3 | Status: SHIPPED | OUTPATIENT
Start: 2019-06-25 | End: 2019-10-22

## 2019-06-25 RX ADMIN — AMLODIPINE BESYLATE 10 MG: 10 TABLET ORAL at 08:33

## 2019-06-25 RX ADMIN — CEFTRIAXONE 2 G: 2 INJECTION, POWDER, FOR SOLUTION INTRAMUSCULAR; INTRAVENOUS at 11:00

## 2019-06-25 RX ADMIN — Medication 1 CAPSULE: at 08:33

## 2019-06-25 RX ADMIN — MULTIPLE VITAMINS W/ MINERALS TAB 1 TABLET: TAB at 08:33

## 2019-06-25 RX ADMIN — TAMSULOSIN HYDROCHLORIDE 0.4 MG: 0.4 CAPSULE ORAL at 08:33

## 2019-06-25 RX ADMIN — PREDNISONE 10 MG: 10 TABLET ORAL at 08:33

## 2019-06-25 RX ADMIN — OXYCODONE HYDROCHLORIDE AND ACETAMINOPHEN 1000 MG: 500 TABLET ORAL at 08:33

## 2019-06-25 RX ADMIN — INSULIN LISPRO 6 UNITS: 100 INJECTION, SOLUTION INTRAVENOUS; SUBCUTANEOUS at 08:32

## 2019-06-25 RX ADMIN — Medication 10 ML: at 11:01

## 2019-06-25 RX ADMIN — INSULIN LISPRO 8 UNITS: 100 INJECTION, SOLUTION INTRAVENOUS; SUBCUTANEOUS at 13:10

## 2019-06-25 RX ADMIN — ACETAMINOPHEN 650 MG: 325 TABLET ORAL at 03:38

## 2019-06-25 RX ADMIN — FUROSEMIDE 20 MG: 20 TABLET ORAL at 08:33

## 2019-06-25 RX ADMIN — PANTOPRAZOLE SODIUM 40 MG: 40 TABLET, DELAYED RELEASE ORAL at 06:01

## 2019-06-25 RX ADMIN — INSULIN LISPRO 18 UNITS: 100 INJECTION, SOLUTION INTRAVENOUS; SUBCUTANEOUS at 13:11

## 2019-06-25 ASSESSMENT — PAIN SCALES - GENERAL
PAINLEVEL_OUTOF10: 2
PAINLEVEL_OUTOF10: 3
PAINLEVEL_OUTOF10: 0

## 2019-06-25 ASSESSMENT — PAIN DESCRIPTION - PROGRESSION: CLINICAL_PROGRESSION: GRADUALLY IMPROVING

## 2019-06-25 ASSESSMENT — PAIN DESCRIPTION - FREQUENCY: FREQUENCY: INTERMITTENT

## 2019-06-25 ASSESSMENT — PAIN DESCRIPTION - DESCRIPTORS: DESCRIPTORS: THROBBING

## 2019-06-25 ASSESSMENT — PAIN DESCRIPTION - PAIN TYPE
TYPE: ACUTE PAIN
TYPE: ACUTE PAIN

## 2019-06-25 ASSESSMENT — PAIN DESCRIPTION - ORIENTATION: ORIENTATION: LEFT

## 2019-06-25 ASSESSMENT — PAIN - FUNCTIONAL ASSESSMENT: PAIN_FUNCTIONAL_ASSESSMENT: ACTIVITIES ARE NOT PREVENTED

## 2019-06-25 ASSESSMENT — PAIN DESCRIPTION - ONSET: ONSET: GRADUAL

## 2019-06-25 ASSESSMENT — PAIN DESCRIPTION - LOCATION: LOCATION: GROIN;FOOT

## 2019-06-25 NOTE — OP NOTE
[address] Khush[place] Fitzpatrick [address]                                OPERATIVE REPORT    PATIENT NAME: Kiran Stein                      :        1955  MED REC NO:   [id number]                          ROOM:       5273  ACCOUNT NO:   [de-identified]                           ADMIT DATE: 2019  PROVIDER:     Irish Schulz MD      DATE OF PROCEDURE:  2019    PREOPERATIVE DIAGNOSES:  Left proximal ureteral calculus, bladder stone,  urinary tract infection, enlarged prostate, history of retention. POSTOPERATIVE DIAGNOSES:  Left proximal ureteral calculus, bladder  stone, urinary tract infection, enlarged prostate, history of retention. OPERATION PERFORMED:  Cystoscopy, left ureteral stent insertion. SURGEON:  Irish Schulz MD    ANESTHESIA:  MAC. FINDINGS:  A 1-cm bladder stone and 9-mm left UPJ stone. DRAINS:  6-Algerian x 26-cm double-J left ureteral stent. 18-Algerian  coude-tip Kingsley catheter. SPECIMEN:  None. EBL:  Minimal.    COMPLICATIONS:  None immediate. DISPOSITION:  Stable to recovery. Return to floor. INDICATIONS:  The patient is a 49-year-old male who presented with  fever, urinary tract infection, and 9-mm left UPJ stone. He also has a  stone in his bladder. He presents now for cystoscopy and left ureteral  stent placement. PROCEDURE:  The patient was properly identified in the preoperative area  and taken to the operating room and placed on the table in supine  position. MAC anesthesia was induced, and the patient was placed in  dorsal lithotomy position. He was prepped and draped in a standard  fashion. Antibiotics were given and a time-out was performed. Rigid cystoscope was placed through the urethra and into the bladder.    The patient has a very enlarged prostate and high-riding bladder neck  and difficult to visualize left ureteral orifice but I was able see so  just past the

## 2019-06-25 NOTE — PLAN OF CARE
telephone. Patient verbalizes understanding. Call light/telephone in reach. Problem: PAIN  Goal: Patient's pain/discomfort is manageable  6/24/2019 2350 by Hemal Temple RN  Outcome: Ongoing  6/24/2019 1112 by Shereen Brewer RN  Outcome: Ongoing  Note:   Pain /discomfort being managed with PRN analgesics per MD orders. Patient able to express presence and absence of pain and rate pain appropriately using numerical scale. Problem: SKIN INTEGRITY  Goal: Skin integrity is maintained or improved  6/24/2019 2350 by Hemal Temple RN  Outcome: Ongoing  6/24/2019 1112 by Shereen Brewer RN  Outcome: Ongoing  Note:   Emmett score assessed. Patient able to ambulate and turn self. Educated patient on importance of repositioning to prevent skin issues. Problem: KNOWLEDGE DEFICIT  Goal: Patient/S.O. demonstrates understanding of disease process, treatment plan, medications, and discharge instructions. 6/24/2019 2350 by Hemal Temple RN  Outcome: Ongoing  6/24/2019 1112 by Shereen Brewer RN  Outcome: Ongoing  Note:   Patient displays no sign or symptoms of knowledge deficit regarding their plan of care, medications, or disease process. Patient is actively involved in his/her care and is able to address any concerns with the nurse. Problem: DISCHARGE BARRIERS  Goal: Patient's continuum of care needs are met  6/24/2019 2350 by Hemal Temple RN  Outcome: Ongoing  6/24/2019 1112 by Shereen Brewer RN  Outcome: Ongoing     Problem: Falls - Risk of:  Goal: Will remain free from falls  Description  Will remain free from falls  6/24/2019 2350 by Hemal Temple RN  Outcome: Ongoing  6/24/2019 1112 by Shereen Brewer RN  Outcome: Ongoing  Note:   Fall risk assessment completed . Fall precautions in place, bed/ chair alarm on, side rails 2/4 up, call light in reach, educated pt on calling for assistance when needed, room clear of clutter. Pt verbalized understanding. Goal: Absence of physical injury  Description  Absence of physical injury  6/24/2019 2350 by Mala Vincent RN  Outcome: Ongoing  6/24/2019 1112 by Rebecca Guerrero RN  Outcome: Ongoing  Note:   Fall risk assessment completed . Fall precautions in place, bed/ chair alarm on, side rails 2/4 up, call light in reach, educated pt on calling for assistance when needed, room clear of clutter. Pt verbalized understanding. Problem: Urinary Elimination:  Goal: Signs and symptoms of infection will decrease  Description  Signs and symptoms of infection will decrease  6/24/2019 2350 by Mala Vincent RN  Outcome: Ongoing  6/24/2019 1112 by Rebecca Guerrero RN  Outcome: Ongoing  Goal: Ability to reestablish a normal urinary elimination pattern will improve - after catheter removal  Description  Ability to reestablish a normal urinary elimination pattern will improve  6/24/2019 2350 by Mala Vincent RN  Outcome: Ongoing  6/24/2019 1112 by Rebecca Guerrero RN  Outcome: Ongoing  Goal: Complications related to the disease process, condition or treatment will be avoided or minimized  Description  Complications related to the disease process, condition or treatment will be avoided or minimized  6/24/2019 2350 by Mala Vincent RN  Outcome: Ongoing  6/24/2019 1112 by Rebecca Guerrero RN  Outcome: Ongoing     Problem: Pain:  Goal: Pain level will decrease  Description  Pain level will decrease  6/24/2019 2350 by Mala Vincent RN  Outcome: Ongoing  6/24/2019 1112 by Rebecca Guerrero RN  Outcome: Ongoing  Note:   Pain /discomfort being managed with PRN analgesics per MD orders. Patient able to express presence and absence of pain and rate pain appropriately using numerical scale.      Goal: Control of acute pain  Description  Control of acute pain  6/24/2019 2350 by Mala Vincent RN  Outcome: Ongoing  6/24/2019 1112 by Rebecca Guerrero RN  Outcome: Ongoing  Note:   Pain /discomfort being managed with PRN analgesics per MD orders. Patient able to express presence and absence of pain and rate pain appropriately using numerical scale. Goal: Control of chronic pain  Description  Control of chronic pain  6/24/2019 2350 by Chauncey Trujillo RN  Outcome: Ongoing  6/24/2019 1112 by Sherlyn Cornell RN  Outcome: Ongoing  Note:   Pain /discomfort being managed with PRN analgesics per MD orders. Patient able to express presence and absence of pain and rate pain appropriately using numerical scale. Problem: SAFETY  Goal: Free from accidental physical injury  6/24/2019 2350 by Chauncey Trujillo RN  Outcome: Ongoing  6/24/2019 1112 by Sherlyn Cornell RN  Outcome: Ongoing  Note:   Fall risk assessment completed . Fall precautions in place, bed/ chair alarm on, side rails 2/4 up, call light in reach, educated pt on calling for assistance when needed, room clear of clutter. Pt verbalized understanding. Problem: DAILY CARE  Goal: Daily care needs are met  6/24/2019 2350 by Chauncey Trujillo RN  Outcome: Ongoing  6/24/2019 1112 by Sherlyn Cornell RN  Outcome: Ongoing  Note:   Checking on patient Q2H for nutrition needs, hygiene needs, comfort measures, mobility, fall risk interventions, and safe environment. All precautions and interventions in place. Educated patient on use of call light and telephone. Patient verbalizes understanding. Call light/telephone in reach. Problem: PAIN  Goal: Patient's pain/discomfort is manageable  6/24/2019 2350 by Chauncey Trujillo RN  Outcome: Ongoing  6/24/2019 1112 by Sherlyn Cornell RN  Outcome: Ongoing  Note:   Pain /discomfort being managed with PRN analgesics per MD orders. Patient able to express presence and absence of pain and rate pain appropriately using numerical scale.           Problem: SKIN INTEGRITY  Goal: Skin integrity is maintained or improved  6/24/2019 2350 by Chauncey Trujillo RN  Outcome: Ongoing  6/24/2019 1112 by Rossy De La Rosa

## 2019-06-25 NOTE — PROGRESS NOTES
Pt Name: Naida Bennett  Medical Record Number: [id number]  Date of Birth 1955   Today's Date: 6/25/2019      Subjective:  Awake in chair, Overall continues to feel better. Left testicle tenderness improved today- still very hard, swelling continues to improve. Voiding appropriately. Having dysuria - r/t stent. ROS: Constitutional: No fever    Vitals:  Vitals:    06/24/19 2115 06/24/19 2316 06/25/19 0512 06/25/19 0800   BP: 138/86 137/83 (!) 149/81 (!) 140/73   Pulse: 107 106 99 101   Resp: 20 16 18 20   Temp: 98.5 °F (36.9 °C) 97.8 °F (36.6 °C) 98.4 °F (36.9 °C) 98.8 °F (37.1 °C)   TempSrc: Oral Oral Oral Oral   SpO2: 92% 91% 92%    Weight:   172 lb 2.9 oz (78.1 kg)    Height:         I/O last 3 completed shifts:   In: 56 [P.O.:840; IV Piggyback:50]  Out: 1402 [Urine:1402]    Exam:  General: Awake, oriented, no acute distress  Respiratory: Nonlabored breathing  Abdomen: Soft, non-tender, non-distended, no masses  : spontaneously voiding, left testicle tenderness and swelling improving, still very firm  Skin: Skin color, texture, turgor normal, no rashes or lesions  Neurologic: no gross deficits    CURRENT MEDICATIONS   Scheduled Meds:   insulin glargine  15 Units Subcutaneous Nightly    predniSONE  10 mg Oral Daily    furosemide  20 mg Oral Daily    cefTRIAXone (ROCEPHIN) IV  2 g Intravenous Q24H    amLODIPine  10 mg Oral Daily    lovastatin  20 mg Oral Nightly    pantoprazole  40 mg Oral QAM AC    therapeutic multivitamin-minerals  1 tablet Oral Daily    tamsulosin  0.4 mg Oral Daily    vitamin C  1,000 mg Oral Daily    lactobacillus  1 capsule Oral Daily with breakfast    sodium chloride flush  10 mL Intravenous 2 times per day    insulin lispro  0-12 Units Subcutaneous TID WC    insulin lispro  0-6 Units Subcutaneous Nightly    sodium chloride  1,000 mL Intravenous Once     Continuous Infusions:   dextrose       PRN Meds:.potassium chloride **OR** potassium alternative oral replacement **OR** potassium chloride, magnesium sulfate, magnesium hydroxide, ondansetron, famotidine, nicotine, acetaminophen, HYDROcodone 5 mg - acetaminophen **OR** HYDROcodone 5 mg - acetaminophen, morphine **OR** morphine, sodium chloride flush, glucose, dextrose, glucagon (rDNA), dextrose    LABS     Recent Labs     06/23/19  0529 06/24/19  0659 06/25/19  0541   WBC 16.0* 16.9* 15.8*   HGB 11.5* 12.6* 12.0*   HCT 34.6* 37.2* 35.3*    346 391    140 141   K 3.5 3.4* 3.8   CL 96* 94* 93*   CO2 27 31 32   BUN 12 20 29*   CREATININE 1.2 1.1 1.4*   MG 1.80 1.90 2.20   CALCIUM 9.2 9.8 9.6       ASSESSMENT   1. Hospital day # 7   2. 63y.o. Male with left abdominal pain, left testicular pain. CT scan with 7mm stone at UVJ with mild hydro, separate 9mm stone in bladder. 3. Sepsis  4. S/p left ureteral stent placement with Dr. Ene Blank 6/18/19. 5. Previous history of urinary retention (2yrs ago), reports has been ok since taking Flomax. 6. Epidymo-orchitis with skin fixation  PLAN   1. KUB shows stone, will plan for outpatient ESWL  2. urine cultures, Strep agalacticae - on Rocephin  3. PVR appropriate Continue flomax  4. Elevate and ice scrotum   5. Definitive stone treatment in 2 weeks, ESWL.       SERGIO Moore - CNP 6/25/2019 9:11 AM

## 2019-06-25 NOTE — DISCHARGE SUMMARY
Pt discharged to home, iv removed, tele monitor removed, pt discharged with documented belongings, paperwork reviewed and signed with pt and family, plan of care reviewed, pt transferred by wheelchair to front of hospital, pt and family satisfied with plan of care
rhythm, S1 and S2 normal, no murmur, rub   or gallop   Abdomen:     Soft, non-tender, bowel sounds active all four quadrants,     no masses, no organomegaly   Genitalia:    Normal male without lesion, discharge or tenderness   Rectal:    Normal tone, normal prostate, no masses or tenderness;    guaiac negative stool   Extremities:   Extremities normal, atraumatic, no cyanosis or edema   Pulses:   2+ and symmetric all extremities   Skin:   Skin color, texture, turgor normal, no rashes or lesions   Lymph nodes:   Cervical, supraclavicular, and axillary nodes normal   Neurologic:   CNII-XII intact. Normal strength, sensation and reflexes       throughout       Disposition: home    In process/preliminary results:  Outstanding Order Results     No orders found from 5/20/2019 to 6/19/2019. Patient Instructions:   Current Discharge Medication List      START taking these medications    Details   HYDROcodone-acetaminophen (NORCO) 5-325 MG per tablet Take 1 tablet by mouth every 6 hours as needed for Pain for up to 5 days.   Qty: 20 tablet, Refills: 0    Comments: Reduce doses taken as pain becomes manageable  Associated Diagnoses: Shoulder pain, left      lactobacillus (CULTURELLE) capsule Take 1 capsule by mouth 2 times daily for 7 days  Qty: 14 capsule, Refills: 0      predniSONE (DELTASONE) 10 MG tablet Take 1 tablet by mouth daily for 3 days  Qty: 3 tablet, Refills: 0      nicotine (NICODERM CQ) 14 MG/24HR Place 1 patch onto the skin daily as needed (if patient is a smoker and requesting a patch)  Qty: 30 patch, Refills: 3      cefUROXime (CEFTIN) 500 MG tablet Take 1 tablet by mouth 2 times daily for 7 days  Qty: 14 tablet, Refills: 0         CONTINUE these medications which have CHANGED    Details   glipiZIDE (GLUCOTROL) 5 MG tablet Take 1 tablet by mouth 2 times daily (before meals)  Qty: 60 tablet, Refills: 3    Associated Diagnoses: Shoulder pain, left; Biceps tendonitis         CONTINUE these medications

## 2019-06-25 NOTE — PROGRESS NOTES
Patient's wife has an incident this morning, as she was going to the bathroom she had a stool accident on the floor. The wife stated she is not sick, its something she eat that did not agree with her. We discussed the above issue with her at length that if she feels sick at all she can not stay at the patient's bed side. Patient and wife were agreeable.

## 2019-07-01 ENCOUNTER — OFFICE VISIT (OUTPATIENT)
Dept: FAMILY MEDICINE CLINIC | Age: 64
End: 2019-07-01
Payer: MEDICARE

## 2019-07-01 ENCOUNTER — TELEPHONE (OUTPATIENT)
Dept: FAMILY MEDICINE CLINIC | Age: 64
End: 2019-07-01

## 2019-07-01 VITALS
SYSTOLIC BLOOD PRESSURE: 136 MMHG | DIASTOLIC BLOOD PRESSURE: 84 MMHG | OXYGEN SATURATION: 99 % | BODY MASS INDEX: 26.35 KG/M2 | WEIGHT: 168.25 LBS | HEART RATE: 121 BPM

## 2019-07-01 DIAGNOSIS — D64.9 ANEMIA, UNSPECIFIED TYPE: ICD-10-CM

## 2019-07-01 DIAGNOSIS — Z09 HOSPITAL DISCHARGE FOLLOW-UP: ICD-10-CM

## 2019-07-01 DIAGNOSIS — E11.9 TYPE 2 DIABETES MELLITUS WITHOUT COMPLICATION, WITHOUT LONG-TERM CURRENT USE OF INSULIN (HCC): Primary | ICD-10-CM

## 2019-07-01 DIAGNOSIS — N17.9 AKI (ACUTE KIDNEY INJURY) (HCC): ICD-10-CM

## 2019-07-01 DIAGNOSIS — M10.079 ACUTE IDIOPATHIC GOUT OF FOOT, UNSPECIFIED LATERALITY: ICD-10-CM

## 2019-07-01 DIAGNOSIS — N20.0 CALCULUS OF KIDNEY: ICD-10-CM

## 2019-07-01 DIAGNOSIS — N39.0 URINARY TRACT INFECTION WITHOUT HEMATURIA, SITE UNSPECIFIED: ICD-10-CM

## 2019-07-01 DIAGNOSIS — D50.9 MICROCYTIC ANEMIA: ICD-10-CM

## 2019-07-01 LAB
ANION GAP SERPL CALCULATED.3IONS-SCNC: 16 MMOL/L (ref 3–16)
BASOPHILS ABSOLUTE: 0 K/UL (ref 0–0.2)
BASOPHILS RELATIVE PERCENT: 0.3 %
BUN BLDV-MCNC: 21 MG/DL (ref 7–20)
CALCIUM SERPL-MCNC: 9.8 MG/DL (ref 8.3–10.6)
CHLORIDE BLD-SCNC: 97 MMOL/L (ref 99–110)
CO2: 24 MMOL/L (ref 21–32)
CREAT SERPL-MCNC: 1.1 MG/DL (ref 0.8–1.3)
EOSINOPHILS ABSOLUTE: 0.1 K/UL (ref 0–0.6)
EOSINOPHILS RELATIVE PERCENT: 1.2 %
FERRITIN: 137.7 NG/ML (ref 30–400)
GFR AFRICAN AMERICAN: >60
GFR NON-AFRICAN AMERICAN: >60
GLUCOSE BLD-MCNC: 195 MG/DL (ref 70–99)
HCT VFR BLD CALC: 37.5 % (ref 40.5–52.5)
HEMOGLOBIN: 12.5 G/DL (ref 13.5–17.5)
IRON SATURATION: 13 % (ref 20–50)
IRON: 36 UG/DL (ref 59–158)
LYMPHOCYTES ABSOLUTE: 1.2 K/UL (ref 1–5.1)
LYMPHOCYTES RELATIVE PERCENT: 10.1 %
MCH RBC QN AUTO: 28.8 PG (ref 26–34)
MCHC RBC AUTO-ENTMCNC: 33.2 G/DL (ref 31–36)
MCV RBC AUTO: 86.9 FL (ref 80–100)
MONOCYTES ABSOLUTE: 0.7 K/UL (ref 0–1.3)
MONOCYTES RELATIVE PERCENT: 5.5 %
NEUTROPHILS ABSOLUTE: 10 K/UL (ref 1.7–7.7)
NEUTROPHILS RELATIVE PERCENT: 82.9 %
PDW BLD-RTO: 14.5 % (ref 12.4–15.4)
PLATELET # BLD: 569 K/UL (ref 135–450)
PMV BLD AUTO: 7 FL (ref 5–10.5)
POTASSIUM SERPL-SCNC: 5.1 MMOL/L (ref 3.5–5.1)
RBC # BLD: 4.32 M/UL (ref 4.2–5.9)
SODIUM BLD-SCNC: 137 MMOL/L (ref 136–145)
TOTAL IRON BINDING CAPACITY: 280 UG/DL (ref 260–445)
WBC # BLD: 12 K/UL (ref 4–11)

## 2019-07-01 PROCEDURE — 2022F DILAT RTA XM EVC RTNOPTHY: CPT | Performed by: INTERNAL MEDICINE

## 2019-07-01 PROCEDURE — G8427 DOCREV CUR MEDS BY ELIG CLIN: HCPCS | Performed by: INTERNAL MEDICINE

## 2019-07-01 PROCEDURE — 3045F PR MOST RECENT HEMOGLOBIN A1C LEVEL 7.0-9.0%: CPT | Performed by: INTERNAL MEDICINE

## 2019-07-01 PROCEDURE — 1123F ACP DISCUSS/DSCN MKR DOCD: CPT | Performed by: INTERNAL MEDICINE

## 2019-07-01 PROCEDURE — 99214 OFFICE O/P EST MOD 30 MIN: CPT | Performed by: INTERNAL MEDICINE

## 2019-07-01 PROCEDURE — 1111F DSCHRG MED/CURRENT MED MERGE: CPT | Performed by: INTERNAL MEDICINE

## 2019-07-01 PROCEDURE — 3017F COLORECTAL CA SCREEN DOC REV: CPT | Performed by: INTERNAL MEDICINE

## 2019-07-01 PROCEDURE — G8419 CALC BMI OUT NRM PARAM NOF/U: HCPCS | Performed by: INTERNAL MEDICINE

## 2019-07-01 PROCEDURE — 4040F PNEUMOC VAC/ADMIN/RCVD: CPT | Performed by: INTERNAL MEDICINE

## 2019-07-01 PROCEDURE — 1036F TOBACCO NON-USER: CPT | Performed by: INTERNAL MEDICINE

## 2019-07-01 PROCEDURE — 36415 COLL VENOUS BLD VENIPUNCTURE: CPT | Performed by: INTERNAL MEDICINE

## 2019-07-01 RX ORDER — LANCETS 28 GAUGE
1 EACH MISCELLANEOUS 2 TIMES DAILY
Qty: 100 EACH | Refills: 5 | Status: SHIPPED | OUTPATIENT
Start: 2019-07-01 | End: 2022-06-20

## 2019-07-01 RX ORDER — BLOOD-GLUCOSE METER
1 KIT MISCELLANEOUS DAILY
Qty: 1 KIT | Refills: 0 | Status: SHIPPED | OUTPATIENT
Start: 2019-07-01 | End: 2022-02-15

## 2019-07-01 ASSESSMENT — ENCOUNTER SYMPTOMS
NAUSEA: 0
WHEEZING: 0
SINUS PRESSURE: 0
SORE THROAT: 0
EYE DISCHARGE: 0
DIARRHEA: 0
SHORTNESS OF BREATH: 0
EYE REDNESS: 0
ABDOMINAL PAIN: 0
VOMITING: 0
SINUS PAIN: 0
RHINORRHEA: 0
COUGH: 0

## 2019-07-01 NOTE — PROGRESS NOTES
for 7 days             lovastatin (MEVACOR) 20 MG tablet  Take 1 tablet by mouth nightly             Multiple Vitamins-Minerals (THERAPEUTIC MULTIVITAMIN-MINERALS) tablet  Take 1 tablet by mouth daily             Omega-3 Fatty Acids (FISH OIL) 1000 MG CAPS  Take 1,000 mg by mouth daily             omeprazole (PRILOSEC) 20 MG delayed release capsule  Take 1 capsule by mouth daily             sitaGLIPtan-metformin (JANUMET)  MG per tablet  Take 1 tablet by mouth 2 times daily (with meals). tamsulosin (FLOMAX) 0.4 MG capsule  Take 1 capsule by mouth daily             terazosin (HYTRIN) 2 MG capsule  Take 2 mg by mouth daily                   Medications marked \"taking\" at this time  Outpatient Medications Marked as Taking for the 7/1/19 encounter (Office Visit) with Anne Souza MD   Medication Sig Dispense Refill    glucose monitoring kit (FREESTYLE) monitoring kit 1 kit by Does not apply route daily 1 kit 0    FREESTYLE LANCETS MISC 1 each by Does not apply route 2 times daily 100 each 5    blood glucose test strips (ASCENSIA AUTODISC VI;ONE TOUCH ULTRA TEST VI) strip 1 each by In Vitro route 2 times daily As needed.  100 each 5    glipiZIDE (GLUCOTROL) 5 MG tablet Take 1 tablet by mouth 2 times daily (before meals) 60 tablet 3    lactobacillus (CULTURELLE) capsule Take 1 capsule by mouth 2 times daily for 7 days 14 capsule 0    cefUROXime (CEFTIN) 500 MG tablet Take 1 tablet by mouth 2 times daily for 7 days 14 tablet 0    terazosin (HYTRIN) 2 MG capsule Take 2 mg by mouth daily      amLODIPine (NORVASC) 10 MG tablet Take 1 tablet by mouth daily 30 tablet 3    lovastatin (MEVACOR) 20 MG tablet Take 1 tablet by mouth nightly 30 tablet 3    Cholecalciferol (VITAMIN D3) 2000 units CAPS Take 1 capsule by mouth daily 30 capsule 3    omeprazole (PRILOSEC) 20 MG delayed release capsule Take 1 capsule by mouth daily 30 capsule 3    tamsulosin (FLOMAX) 0.4 MG capsule Take 1 capsule by mouth daily 30 capsule 3    ibuprofen (ADVIL;MOTRIN) 800 MG tablet Take 1 tablet by mouth as needed for Pain 30 tablet 3    Omega-3 Fatty Acids (FISH OIL) 1000 MG CAPS Take 1,000 mg by mouth daily      Ascorbic Acid (VITAMIN C) 1000 MG tablet Take 1,000 mg by mouth daily      Multiple Vitamins-Minerals (THERAPEUTIC MULTIVITAMIN-MINERALS) tablet Take 1 tablet by mouth daily      sitaGLIPtan-metformin (JANUMET)  MG per tablet Take 1 tablet by mouth 2 times daily (with meals). Medications patient taking as of now reconciled against medications ordered at time of hospital discharge: Yes    Chief Complaint   Patient presents with    Follow-Up from Hospital       HPI    Inpatient course: Discharge summary reviewed- see chart. Patient was admitted for nephrolithiasis and urinary tract infection. He had a stent placed. Plan is for it to be removed on Friday. He has follow-up with Dr. Ryley Fish. His hospital course is complicated by hyperglycemia that was treated with an increased frequency of glipizide, acute episode of gout treated with prednisone and urinary tract infection for which she is completing oral antibiotic therapy. Interval history/Current status: He is still having pain with urination but was told this is to be expected. He has not had any fever or chills. No abdominal pain, nausea, vomiting. His gout has essentially resolved with no recurrence. He is tolerating increased dosing of glipizide but would like to start monitoring his blood sugar at home. He had lost 10 pounds prior to this admission to help manage his diabetes. Review of Systems   Constitutional: Negative for chills and fever. HENT: Negative for congestion, ear pain, rhinorrhea, sinus pressure, sinus pain and sore throat. Eyes: Negative for discharge and redness. Respiratory: Negative for cough, shortness of breath and wheezing. Cardiovascular: Negative for chest pain, palpitations and leg swelling. Gastrointestinal: Negative for abdominal pain, diarrhea, nausea and vomiting. Genitourinary: Positive for difficulty urinating. Negative for dysuria. Musculoskeletal: Negative for myalgias. Skin: Negative for rash. Vitals:    07/01/19 1152   BP: 136/84   Pulse: 121   SpO2: 99%   Weight: 168 lb 4 oz (76.3 kg)     Body mass index is 26.35 kg/m². Wt Readings from Last 3 Encounters:   07/01/19 168 lb 4 oz (76.3 kg)   06/25/19 172 lb 2.9 oz (78.1 kg)   05/16/19 176 lb 8 oz (80.1 kg)     BP Readings from Last 3 Encounters:   07/01/19 136/84   06/25/19 133/87   06/18/19 102/65       Physical Exam   Constitutional: He appears well-developed and well-nourished. No distress. HENT:   Head: Normocephalic and atraumatic. Neck: Neck supple. Cardiovascular: Regular rhythm and normal heart sounds. No murmur heard. Tachycardic   Pulmonary/Chest: Effort normal and breath sounds normal. No respiratory distress. He has no wheezes. He has no rales. Abdominal: Soft. Bowel sounds are normal. There is no tenderness. Musculoskeletal: He exhibits no edema or tenderness. Lymphadenopathy:     He has no cervical adenopathy. Neurological: No cranial nerve deficit. Skin: Skin is warm and dry. Capillary refill takes less than 2 seconds. No rash noted. Assessment/Plan:  1. Type 2 diabetes mellitus without complication, without long-term current use of insulin (HCC)  We will see how his blood sugar settles out after completing steroids. He will monitor his blood sugar at home, supplies sent to pharmacy. He is advised to go back to once daily glipizide or contact me should he have any hypoglycemia as this is a concern given recent weight loss  - OK DISCHARGE MEDS RECONCILED W/ CURRENT OUTPATIENT MED LIST    2. Acute idiopathic gout of foot, unspecified laterality  Essentially resolved.   Return precautions reviewed for any recurrent symptoms  - OK DISCHARGE MEDS RECONCILED W/ CURRENT OUTPATIENT MED

## 2019-07-01 NOTE — TELEPHONE ENCOUNTER
[facility] Transitions Initial Follow Up Call    Outreach made within 2 business days of discharge: No    Patient: Eamon Jimenez Patient : 1955   MRN: [id number]  Reason for Admission: There are no discharge diagnoses documented for the most recent discharge. Discharge Date: 19       Spoke with: Patient    Discharge department/facility: [address] Interactive Patient Contact:  Was patient able to fill all prescriptions: Yes  Was patient instructed to bring all medications to the follow-up visit: Yes  Is patient taking all medications as directed in the discharge summary?  Yes  Does patient understand their discharge instructions: Yes  Does patient have questions or concerns that need addressed prior to 7-14 day follow up office visit: Yes     Scheduled appointment with PCP within 7-14 days    Follow Up  Future Appointments   Date Time Provider Rowdy Turner   2019 11:45 AM Shant Glez MD [facility] [place]   8/15/2019 10:00 AM Shant Glez MD [address]

## 2019-07-02 ENCOUNTER — TELEPHONE (OUTPATIENT)
Dept: FAMILY MEDICINE CLINIC | Age: 64
End: 2019-07-02

## 2019-07-03 LAB — URINE CULTURE, ROUTINE: NORMAL

## 2019-07-05 ENCOUNTER — TELEPHONE (OUTPATIENT)
Dept: FAMILY MEDICINE CLINIC | Age: 64
End: 2019-07-05

## 2019-08-15 ENCOUNTER — OFFICE VISIT (OUTPATIENT)
Dept: FAMILY MEDICINE CLINIC | Age: 64
End: 2019-08-15
Payer: MEDICARE

## 2019-08-15 VITALS
BODY MASS INDEX: 26.89 KG/M2 | OXYGEN SATURATION: 94 % | DIASTOLIC BLOOD PRESSURE: 84 MMHG | SYSTOLIC BLOOD PRESSURE: 148 MMHG | HEART RATE: 103 BPM | HEIGHT: 68 IN | WEIGHT: 177.4 LBS

## 2019-08-15 DIAGNOSIS — I10 ESSENTIAL HYPERTENSION: ICD-10-CM

## 2019-08-15 DIAGNOSIS — E78.5 HYPERLIPIDEMIA, UNSPECIFIED HYPERLIPIDEMIA TYPE: ICD-10-CM

## 2019-08-15 DIAGNOSIS — E11.9 TYPE 2 DIABETES MELLITUS WITHOUT COMPLICATION, WITHOUT LONG-TERM CURRENT USE OF INSULIN (HCC): Primary | ICD-10-CM

## 2019-08-15 LAB
A/G RATIO: 1.7 (ref 1.1–2.2)
ALBUMIN SERPL-MCNC: 4.4 G/DL (ref 3.4–5)
ALP BLD-CCNC: 90 U/L (ref 40–129)
ALT SERPL-CCNC: 15 U/L (ref 10–40)
ANION GAP SERPL CALCULATED.3IONS-SCNC: 15 MMOL/L (ref 3–16)
AST SERPL-CCNC: 15 U/L (ref 15–37)
BILIRUB SERPL-MCNC: 0.6 MG/DL (ref 0–1)
BUN BLDV-MCNC: 27 MG/DL (ref 7–20)
CALCIUM SERPL-MCNC: 10.4 MG/DL (ref 8.3–10.6)
CHLORIDE BLD-SCNC: 102 MMOL/L (ref 99–110)
CHOLESTEROL, TOTAL: 171 MG/DL (ref 0–199)
CO2: 26 MMOL/L (ref 21–32)
CREAT SERPL-MCNC: 1 MG/DL (ref 0.8–1.3)
GFR AFRICAN AMERICAN: >60
GFR NON-AFRICAN AMERICAN: >60
GLOBULIN: 2.6 G/DL
GLUCOSE BLD-MCNC: 234 MG/DL (ref 70–99)
HDLC SERPL-MCNC: 40 MG/DL (ref 40–60)
LDL CHOLESTEROL CALCULATED: 79 MG/DL
POTASSIUM SERPL-SCNC: 4.6 MMOL/L (ref 3.5–5.1)
SODIUM BLD-SCNC: 143 MMOL/L (ref 136–145)
TOTAL PROTEIN: 7 G/DL (ref 6.4–8.2)
TRIGL SERPL-MCNC: 258 MG/DL (ref 0–150)
VLDLC SERPL CALC-MCNC: 52 MG/DL

## 2019-08-15 PROCEDURE — G8419 CALC BMI OUT NRM PARAM NOF/U: HCPCS | Performed by: INTERNAL MEDICINE

## 2019-08-15 PROCEDURE — 3045F PR MOST RECENT HEMOGLOBIN A1C LEVEL 7.0-9.0%: CPT | Performed by: INTERNAL MEDICINE

## 2019-08-15 PROCEDURE — 99214 OFFICE O/P EST MOD 30 MIN: CPT | Performed by: INTERNAL MEDICINE

## 2019-08-15 PROCEDURE — 3017F COLORECTAL CA SCREEN DOC REV: CPT | Performed by: INTERNAL MEDICINE

## 2019-08-15 PROCEDURE — 1036F TOBACCO NON-USER: CPT | Performed by: INTERNAL MEDICINE

## 2019-08-15 PROCEDURE — G8427 DOCREV CUR MEDS BY ELIG CLIN: HCPCS | Performed by: INTERNAL MEDICINE

## 2019-08-15 PROCEDURE — 2022F DILAT RTA XM EVC RTNOPTHY: CPT | Performed by: INTERNAL MEDICINE

## 2019-08-15 PROCEDURE — 1123F ACP DISCUSS/DSCN MKR DOCD: CPT | Performed by: INTERNAL MEDICINE

## 2019-08-15 PROCEDURE — 36415 COLL VENOUS BLD VENIPUNCTURE: CPT | Performed by: INTERNAL MEDICINE

## 2019-08-15 PROCEDURE — 4040F PNEUMOC VAC/ADMIN/RCVD: CPT | Performed by: INTERNAL MEDICINE

## 2019-08-15 RX ORDER — LOSARTAN POTASSIUM 25 MG/1
25 TABLET ORAL DAILY
Qty: 30 TABLET | Refills: 5 | Status: SHIPPED | OUTPATIENT
Start: 2019-08-15 | End: 2019-11-07 | Stop reason: SDUPTHER

## 2019-08-15 ASSESSMENT — ENCOUNTER SYMPTOMS
SHORTNESS OF BREATH: 0
ABDOMINAL PAIN: 0

## 2019-08-15 NOTE — PROGRESS NOTES
8/15/2019     Anna Mosley (:  1955) is a 70 y.o. male, here for evaluation of the following medical concerns:    HPI  Patient presents for follow-up of type 2 diabetes, hypertension, hyperlipidemia. He does not monitor his blood sugar regularly. He is doing well with his medications. He has been working on his diet. However, he has lost 10 pounds that he has since gained back. He had been sick for some time with urological problems. His stent has been removed. His blood pressure is high today. He reports he does monitor it at home and it typically runs 453A systolic over 62H diastolic. No recent chest pain, shortness of breath, edema. He is tolerating his statin without muscle aches or abdominal pain. Review of Systems   Respiratory: Negative for shortness of breath. Cardiovascular: Negative for chest pain, palpitations and leg swelling. Gastrointestinal: Negative for abdominal pain. Musculoskeletal: Negative for myalgias. Neurological: Negative for dizziness and light-headedness. Prior to Visit Medications    Medication Sig Taking? Authorizing Provider   losartan (COZAAR) 25 MG tablet Take 1 tablet by mouth daily Yes Lm Carter MD   FREESTYLE LANCETS MISC 1 each by Does not apply route 2 times daily Yes Lm Carter MD   blood glucose test strips (ASCENSIA AUTODISC VI;ONE TOUCH ULTRA TEST VI) strip 1 each by In Vitro route 2 times daily As needed.  Yes Lm Carter MD   glipiZIDE (GLUCOTROL) 5 MG tablet Take 1 tablet by mouth 2 times daily (before meals)  Patient taking differently: Take 5 mg by mouth daily  Yes Nestor Obrien MD   terazosin (HYTRIN) 2 MG capsule Take 2 mg by mouth daily Yes Historical Provider, MD   amLODIPine (NORVASC) 10 MG tablet Take 1 tablet by mouth daily Yes Lm Carter MD   lovastatin (MEVACOR) 20 MG tablet Take 1 tablet by mouth nightly Yes Lm Carter MD   Cholecalciferol (VITAMIN D3) 2000 units CAPS Take 1 capsule normal. No respiratory distress. He has no wheezes. He has no rales. Musculoskeletal: He exhibits no tenderness. Lymphadenopathy:     He has no cervical adenopathy. Neurological: No cranial nerve deficit. Skin: Skin is warm and dry. Capillary refill takes less than 2 seconds. No rash noted. ASSESSMENT/PLAN:  1. Type 2 diabetes mellitus without complication, without long-term current use of insulin ([facility])  Follow-up A1c today. Discussed weight gain and dietary management. To new present medications pending lab results. We may need to increase his glipizide  - Comprehensive Metabolic Panel  - Hemoglobin A1C    2. Hyperlipidemia, unspecified hyperlipidemia type  Continue statin therapy. Follow-up lipid panel as we do not have one on file  - Lipid Panel    3. Essential hypertension  Blood pressure above goal.  Recommend starting an ARB for his blood pressure and for renal protection from diabetes. Potential adverse effects reviewed and he will notify me should any develop. We will continue to monitor his blood pressure at home as well  - Comprehensive Metabolic Panel      Return in about 3 months (around 11/15/2019). An electronic signature was used to authenticate this note.     --Kaylynn Cook MD on 8/15/2019 at 12:29 PM

## 2019-08-16 LAB
ESTIMATED AVERAGE GLUCOSE: 151.3 MG/DL
HBA1C MFR BLD: 6.9 %

## 2019-08-23 ENCOUNTER — HOSPITAL ENCOUNTER (OUTPATIENT)
Dept: ULTRASOUND IMAGING | Age: 64
Discharge: HOME | End: 2019-08-23
Payer: MEDICARE

## 2019-08-23 DIAGNOSIS — N45.2 ORCHITIS: ICD-10-CM

## 2019-08-23 PROCEDURE — 76870 US EXAM SCROTUM: CPT

## 2019-09-25 ENCOUNTER — NURSE ONLY (OUTPATIENT)
Dept: FAMILY MEDICINE CLINIC | Age: 64
End: 2019-09-25
Payer: MEDICARE

## 2019-09-25 DIAGNOSIS — Z23 NEED FOR INFLUENZA VACCINATION: Primary | ICD-10-CM

## 2019-09-25 PROCEDURE — G0008 ADMIN INFLUENZA VIRUS VAC: HCPCS | Performed by: INTERNAL MEDICINE

## 2019-09-25 PROCEDURE — 90653 IIV ADJUVANT VACCINE IM: CPT | Performed by: INTERNAL MEDICINE

## 2019-10-22 DIAGNOSIS — M25.512 SHOULDER PAIN, LEFT: ICD-10-CM

## 2019-10-22 DIAGNOSIS — M75.20 BICEPS TENDONITIS: ICD-10-CM

## 2019-10-22 RX ORDER — AMLODIPINE BESYLATE 10 MG/1
TABLET ORAL
Qty: 90 TABLET | Refills: 2 | Status: SHIPPED | OUTPATIENT
Start: 2019-10-22 | End: 2019-11-07 | Stop reason: SDUPTHER

## 2019-10-22 RX ORDER — OMEPRAZOLE 20 MG/1
CAPSULE, DELAYED RELEASE ORAL
Qty: 90 CAPSULE | Refills: 2 | Status: SHIPPED | OUTPATIENT
Start: 2019-10-22 | End: 2019-11-07 | Stop reason: SDUPTHER

## 2019-10-22 RX ORDER — LOVASTATIN 20 MG/1
TABLET ORAL
Qty: 90 TABLET | Refills: 2 | Status: SHIPPED | OUTPATIENT
Start: 2019-10-22 | End: 2019-11-07 | Stop reason: SDUPTHER

## 2019-10-22 RX ORDER — GLIPIZIDE 5 MG/1
TABLET ORAL
Qty: 90 TABLET | Refills: 2 | Status: SHIPPED | OUTPATIENT
Start: 2019-10-22 | End: 2019-11-07 | Stop reason: SDUPTHER

## 2019-10-24 ENCOUNTER — TELEPHONE (OUTPATIENT)
Dept: FAMILY MEDICINE CLINIC | Age: 64
End: 2019-10-24

## 2019-11-07 ENCOUNTER — OFFICE VISIT (OUTPATIENT)
Dept: FAMILY MEDICINE CLINIC | Age: 64
End: 2019-11-07
Payer: MEDICARE

## 2019-11-07 VITALS
SYSTOLIC BLOOD PRESSURE: 124 MMHG | OXYGEN SATURATION: 97 % | HEIGHT: 68 IN | WEIGHT: 184.2 LBS | RESPIRATION RATE: 17 BRPM | BODY MASS INDEX: 27.92 KG/M2 | DIASTOLIC BLOOD PRESSURE: 80 MMHG | HEART RATE: 101 BPM

## 2019-11-07 DIAGNOSIS — M75.20 BICEPS TENDONITIS: ICD-10-CM

## 2019-11-07 DIAGNOSIS — E11.9 TYPE 2 DIABETES MELLITUS WITHOUT COMPLICATION, WITHOUT LONG-TERM CURRENT USE OF INSULIN (HCC): ICD-10-CM

## 2019-11-07 DIAGNOSIS — Z00.00 ROUTINE GENERAL MEDICAL EXAMINATION AT A HEALTH CARE FACILITY: Primary | ICD-10-CM

## 2019-11-07 DIAGNOSIS — M25.512 SHOULDER PAIN, LEFT: ICD-10-CM

## 2019-11-07 PROCEDURE — 4040F PNEUMOC VAC/ADMIN/RCVD: CPT | Performed by: INTERNAL MEDICINE

## 2019-11-07 PROCEDURE — G0438 PPPS, INITIAL VISIT: HCPCS | Performed by: INTERNAL MEDICINE

## 2019-11-07 PROCEDURE — G8482 FLU IMMUNIZE ORDER/ADMIN: HCPCS | Performed by: INTERNAL MEDICINE

## 2019-11-07 PROCEDURE — 3044F HG A1C LEVEL LT 7.0%: CPT | Performed by: INTERNAL MEDICINE

## 2019-11-07 PROCEDURE — 3017F COLORECTAL CA SCREEN DOC REV: CPT | Performed by: INTERNAL MEDICINE

## 2019-11-07 PROCEDURE — 1123F ACP DISCUSS/DSCN MKR DOCD: CPT | Performed by: INTERNAL MEDICINE

## 2019-11-07 PROCEDURE — G0009 ADMIN PNEUMOCOCCAL VACCINE: HCPCS | Performed by: INTERNAL MEDICINE

## 2019-11-07 PROCEDURE — 90670 PCV13 VACCINE IM: CPT | Performed by: INTERNAL MEDICINE

## 2019-11-07 RX ORDER — LOVASTATIN 20 MG/1
TABLET ORAL
Qty: 90 TABLET | Refills: 3 | Status: SHIPPED | OUTPATIENT
Start: 2019-11-07 | End: 2020-02-03 | Stop reason: SDUPTHER

## 2019-11-07 RX ORDER — CHLORAL HYDRATE 500 MG
1000 CAPSULE ORAL DAILY
Qty: 90 CAPSULE | Refills: 3 | Status: SHIPPED | OUTPATIENT
Start: 2019-11-07

## 2019-11-07 RX ORDER — OMEPRAZOLE 20 MG/1
CAPSULE, DELAYED RELEASE ORAL
Qty: 90 CAPSULE | Refills: 3 | Status: SHIPPED | OUTPATIENT
Start: 2019-11-07 | End: 2020-04-28 | Stop reason: SDUPTHER

## 2019-11-07 RX ORDER — AMLODIPINE BESYLATE 10 MG/1
TABLET ORAL
Qty: 90 TABLET | Refills: 3 | Status: SHIPPED | OUTPATIENT
Start: 2019-11-07 | End: 2020-02-03 | Stop reason: SDUPTHER

## 2019-11-07 RX ORDER — GLIPIZIDE 5 MG/1
5 TABLET ORAL DAILY
Qty: 90 TABLET | Refills: 3 | Status: SHIPPED | OUTPATIENT
Start: 2019-11-07 | End: 2020-07-27

## 2019-11-07 RX ORDER — ACETAMINOPHEN 160 MG
2000 TABLET,DISINTEGRATING ORAL DAILY
Qty: 90 CAPSULE | Refills: 3 | Status: SHIPPED | OUTPATIENT
Start: 2019-11-07 | End: 2019-11-07 | Stop reason: SDUPTHER

## 2019-11-07 RX ORDER — ACETAMINOPHEN 160 MG
2000 TABLET,DISINTEGRATING ORAL DAILY
Qty: 90 CAPSULE | Refills: 3 | Status: SHIPPED | OUTPATIENT
Start: 2019-11-07 | End: 2020-12-21

## 2019-11-07 RX ORDER — TAMSULOSIN HYDROCHLORIDE 0.4 MG/1
0.4 CAPSULE ORAL DAILY
Qty: 90 CAPSULE | Refills: 3 | Status: SHIPPED | OUTPATIENT
Start: 2019-11-07 | End: 2020-02-03 | Stop reason: SDUPTHER

## 2019-11-07 RX ORDER — MULTIVIT WITH MINERALS/LUTEIN
1000 TABLET ORAL DAILY
Qty: 90 TABLET | Refills: 3 | Status: SHIPPED | OUTPATIENT
Start: 2019-11-07

## 2019-11-07 RX ORDER — LOSARTAN POTASSIUM 25 MG/1
25 TABLET ORAL DAILY
Qty: 90 TABLET | Refills: 3 | Status: SHIPPED | OUTPATIENT
Start: 2019-11-07 | End: 2020-02-03 | Stop reason: SDUPTHER

## 2019-11-07 RX ORDER — TERAZOSIN 2 MG/1
2 CAPSULE ORAL DAILY
Qty: 90 CAPSULE | Refills: 3 | Status: SHIPPED | OUTPATIENT
Start: 2019-11-07 | End: 2020-02-03 | Stop reason: SDUPTHER

## 2019-11-07 ASSESSMENT — LIFESTYLE VARIABLES: HOW OFTEN DO YOU HAVE A DRINK CONTAINING ALCOHOL: 0

## 2019-11-07 ASSESSMENT — PATIENT HEALTH QUESTIONNAIRE - PHQ9
SUM OF ALL RESPONSES TO PHQ QUESTIONS 1-9: 0
SUM OF ALL RESPONSES TO PHQ QUESTIONS 1-9: 0

## 2019-12-02 ENCOUNTER — HOSPITAL ENCOUNTER (OUTPATIENT)
Dept: ULTRASOUND IMAGING | Age: 64
Discharge: HOME | End: 2019-12-02
Payer: MEDICARE

## 2019-12-02 DIAGNOSIS — N45.2 ORCHITIS: ICD-10-CM

## 2019-12-02 DIAGNOSIS — N50.82 SCROTAL PAIN: ICD-10-CM

## 2019-12-02 PROCEDURE — 76870 US EXAM SCROTUM: CPT

## 2020-02-03 RX ORDER — AMLODIPINE BESYLATE 10 MG/1
TABLET ORAL
Qty: 90 TABLET | Refills: 3 | Status: SHIPPED | OUTPATIENT
Start: 2020-02-03 | End: 2021-05-03

## 2020-02-03 RX ORDER — TAMSULOSIN HYDROCHLORIDE 0.4 MG/1
0.4 CAPSULE ORAL DAILY
Qty: 90 CAPSULE | Refills: 3 | Status: SHIPPED | OUTPATIENT
Start: 2020-02-03 | End: 2021-02-22

## 2020-02-03 RX ORDER — LOVASTATIN 20 MG/1
TABLET ORAL
Qty: 90 TABLET | Refills: 3 | Status: SHIPPED | OUTPATIENT
Start: 2020-02-03 | End: 2020-08-20

## 2020-02-03 RX ORDER — TERAZOSIN 2 MG/1
2 CAPSULE ORAL DAILY
Qty: 90 CAPSULE | Refills: 3 | Status: SHIPPED | OUTPATIENT
Start: 2020-02-03 | End: 2020-11-22

## 2020-02-03 RX ORDER — LOSARTAN POTASSIUM 25 MG/1
25 TABLET ORAL DAILY
Qty: 90 TABLET | Refills: 3 | Status: SHIPPED | OUTPATIENT
Start: 2020-02-03 | End: 2020-02-06 | Stop reason: SDUPTHER

## 2020-02-06 ENCOUNTER — OFFICE VISIT (OUTPATIENT)
Dept: FAMILY MEDICINE CLINIC | Age: 65
End: 2020-02-06
Payer: MEDICARE

## 2020-02-06 VITALS
OXYGEN SATURATION: 95 % | HEART RATE: 112 BPM | BODY MASS INDEX: 28.4 KG/M2 | WEIGHT: 189 LBS | DIASTOLIC BLOOD PRESSURE: 88 MMHG | SYSTOLIC BLOOD PRESSURE: 140 MMHG | RESPIRATION RATE: 16 BRPM

## 2020-02-06 LAB — HBA1C MFR BLD: 8 %

## 2020-02-06 PROCEDURE — G8417 CALC BMI ABV UP PARAM F/U: HCPCS | Performed by: INTERNAL MEDICINE

## 2020-02-06 PROCEDURE — 2022F DILAT RTA XM EVC RTNOPTHY: CPT | Performed by: INTERNAL MEDICINE

## 2020-02-06 PROCEDURE — 1123F ACP DISCUSS/DSCN MKR DOCD: CPT | Performed by: INTERNAL MEDICINE

## 2020-02-06 PROCEDURE — 83036 HEMOGLOBIN GLYCOSYLATED A1C: CPT | Performed by: INTERNAL MEDICINE

## 2020-02-06 PROCEDURE — G8427 DOCREV CUR MEDS BY ELIG CLIN: HCPCS | Performed by: INTERNAL MEDICINE

## 2020-02-06 PROCEDURE — 3052F HG A1C>EQUAL 8.0%<EQUAL 9.0%: CPT | Performed by: INTERNAL MEDICINE

## 2020-02-06 PROCEDURE — 1036F TOBACCO NON-USER: CPT | Performed by: INTERNAL MEDICINE

## 2020-02-06 PROCEDURE — G8510 SCR DEP NEG, NO PLAN REQD: HCPCS | Performed by: INTERNAL MEDICINE

## 2020-02-06 PROCEDURE — 99214 OFFICE O/P EST MOD 30 MIN: CPT | Performed by: INTERNAL MEDICINE

## 2020-02-06 PROCEDURE — 3017F COLORECTAL CA SCREEN DOC REV: CPT | Performed by: INTERNAL MEDICINE

## 2020-02-06 PROCEDURE — 4040F PNEUMOC VAC/ADMIN/RCVD: CPT | Performed by: INTERNAL MEDICINE

## 2020-02-06 PROCEDURE — G8482 FLU IMMUNIZE ORDER/ADMIN: HCPCS | Performed by: INTERNAL MEDICINE

## 2020-02-06 RX ORDER — LOSARTAN POTASSIUM 50 MG/1
50 TABLET ORAL DAILY
Qty: 90 TABLET | Refills: 3 | Status: SHIPPED | OUTPATIENT
Start: 2020-02-06 | End: 2021-03-22 | Stop reason: SDUPTHER

## 2020-02-06 ASSESSMENT — PATIENT HEALTH QUESTIONNAIRE - PHQ9
SUM OF ALL RESPONSES TO PHQ QUESTIONS 1-9: 0
SUM OF ALL RESPONSES TO PHQ QUESTIONS 1-9: 0
SUM OF ALL RESPONSES TO PHQ9 QUESTIONS 1 & 2: 0
2. FEELING DOWN, DEPRESSED OR HOPELESS: 0
1. LITTLE INTEREST OR PLEASURE IN DOING THINGS: 0

## 2020-02-06 ASSESSMENT — ENCOUNTER SYMPTOMS
ABDOMINAL PAIN: 0
SHORTNESS OF BREATH: 0

## 2020-02-06 NOTE — PROGRESS NOTES
2020     Cecy Brooks (:  1955) is a 75 y.o. male, here for evaluation of the following medical concerns:    Diabetes   Pertinent negatives for hypoglycemia include no dizziness. Pertinent negatives for diabetes include no chest pain. Patient presents for follow-up of type 2 diabetes, hypertension, hyperlipidemia. He does not monitor his blood sugar regularly. He is doing well with his medications. He is to poor dietary compliance in the interim. He has been drinking only water and black coffee, ever, he is also gained weight as he has been eating a lot of sugar and carbohydrates. He is not exercising regularly. His blood pressure is high today. He reports he does monitor it at home and it typically runs 768M systolic over 54Z diastolic. No recent chest pain, shortness of breath, edema. He is tolerating his statin without muscle aches or abdominal pain. Review of Systems   Respiratory: Negative for shortness of breath. Cardiovascular: Negative for chest pain, palpitations and leg swelling. Gastrointestinal: Negative for abdominal pain. Musculoskeletal: Negative for myalgias. Neurological: Negative for dizziness and light-headedness. Prior to Visit Medications    Medication Sig Taking?  Authorizing Provider   losartan (COZAAR) 50 MG tablet Take 1 tablet by mouth daily Yes Moriah Gregory MD   amLODIPine (NORVASC) 10 MG tablet TAKE ONE TABLET BY MOUTH DAILY Yes Moriah Gregory MD   lovastatin (MEVACOR) 20 MG tablet TAKE ONE TABLET BY MOUTH ONCE NIGHTLY Yes Moriah Gregory MD   sitaGLIPtan-metformin (JANUMET)  MG per tablet Take 1 tablet by mouth 2 times daily (with meals) Yes Moriah Gregory MD   tamsulosin (FLOMAX) 0.4 MG capsule Take 1 capsule by mouth daily Yes Moriah Gregory MD   terazosin (HYTRIN) 2 MG capsule Take 1 capsule by mouth daily Yes Moriah Gregory MD   Ascorbic Acid (VITAMIN C) 1000 MG tablet Take 1 tablet by mouth daily Yes Prasanna Merino MD   Cholecalciferol (VITAMIN D3) 50 MCG (2000 UT) CAPS Take 1 capsule by mouth daily Yes Prasanna Merino MD   glipiZIDE (GLUCOTROL) 5 MG tablet Take 1 tablet by mouth daily Yes Prasanna Merino MD   Omega-3 Fatty Acids (FISH OIL) 1000 MG CAPS Take 1 capsule by mouth daily Yes Prasanna Merino MD   omeprazole (PRILOSEC) 20 MG delayed release capsule TAKE ONE CAPSULE BY MOUTH DAILY Yes Prasanna Merino MD   glucose monitoring kit (FREESTYLE) monitoring kit 1 kit by Does not apply route daily Yes Prasanna Merino MD   FREESTYLE LANCETS MISC 1 each by Does not apply route 2 times daily Yes Prasanna Merino MD   blood glucose test strips (ASCENSIA AUTODISC VI;ONE TOUCH ULTRA TEST VI) strip 1 each by In Vitro route 2 times daily As needed. Yes Prasanna Merino MD   Multiple Vitamins-Minerals (THERAPEUTIC MULTIVITAMIN-MINERALS) tablet Take 1 tablet by mouth daily Yes Historical Provider, MD   zoster recombinant adjuvanted vaccine [facility]) 50 MCG/0.5ML SUSR injection Inject 0.5 mLs into the muscle See Admin Instructions 1 dose now and repeat in 2-6 months  Patient not taking: Reported on 2/6/2020  Prasanna Merino MD        Social History     Tobacco Use    Smoking status: Never Smoker    Smokeless tobacco: Never Used   Substance Use Topics    Alcohol use: No        Vitals:    02/06/20 0948   BP: (!) 140/88   Pulse: 112   Resp: 16   SpO2: 95%   Weight: 189 lb (85.7 kg)     Estimated body mass index is 28.4 kg/m² as calculated from the following:    Height as of 11/7/19: 5' 8.4\" (1.737 m). Weight as of this encounter: 189 lb (85.7 kg). Physical Exam  Constitutional:       General: He is not in acute distress. Appearance: He is well-developed. HENT:      Head: Normocephalic and atraumatic. Right Ear: External ear normal.      Left Ear: External ear normal.   Eyes:      General:         Right eye: No discharge. Left eye: No discharge.       Conjunctiva/sclera: Conjunctivae normal.      Pupils: Pupils are equal, round, and reactive to light. Neck:      Musculoskeletal: Neck supple. Cardiovascular:      Rate and Rhythm: Normal rate and regular rhythm. Heart sounds: Normal heart sounds. No murmur. Pulmonary:      Effort: Pulmonary effort is normal. No respiratory distress. Breath sounds: Normal breath sounds. No wheezing or rales. Musculoskeletal:         General: No tenderness. Lymphadenopathy:      Cervical: No cervical adenopathy. Skin:     General: Skin is warm and dry. Capillary Refill: Capillary refill takes less than 2 seconds. Findings: No rash. Neurological:      Cranial Nerves: No cranial nerve deficit. ASSESSMENT/PLAN:  1. Type 2 diabetes mellitus without complication, without long-term current use of insulin (HCC)  Glycemic control has worsened quite a bit, discussed weight gain and importance of dietary management. We will not adjust medications at this time, he will work on lifestyle changes. If no improvement in 3 months, we will need to change his medications however. He voices agreement and understanding  - Comprehensive Metabolic Panel  - Hemoglobin A1C    2. Hyperlipidemia, unspecified hyperlipidemia type  Continue statin therapy. Follow-up lipid panel next visit  - Lipid Panel    3. Essential hypertension  Blood pressure above goal.  Recommend increasing aRB for his blood pressure and for renal protection from diabetes. Potential adverse effects reviewed and he will notify me should any develop. We will continue to monitor his blood pressure at home as well  - Comprehensive Metabolic Panel      Return in about 3 months (around 5/6/2020). An electronic signature was used to authenticate this note.     --Shelby Sidhu MD on 2/6/2020 at 10:22 AM

## 2020-04-03 NOTE — ED TRIAGE NOTES
Pt to ER c/o abd pain and testicular pain x 4 days progressivly getting worse. Pt abd distended and taut. Pt states this is chronic appearing and was told he has a hernia. Pt states he is nauseated, has had a headaches-believed to be related to high blood pressure. Pt wife reports their son killed himself recently and pt hasn't been feeling well. No

## 2020-04-28 ENCOUNTER — VIRTUAL VISIT (OUTPATIENT)
Dept: FAMILY MEDICINE CLINIC | Age: 65
End: 2020-04-28
Payer: MEDICARE

## 2020-04-28 PROCEDURE — G8427 DOCREV CUR MEDS BY ELIG CLIN: HCPCS | Performed by: INTERNAL MEDICINE

## 2020-04-28 PROCEDURE — 4040F PNEUMOC VAC/ADMIN/RCVD: CPT | Performed by: INTERNAL MEDICINE

## 2020-04-28 PROCEDURE — 99214 OFFICE O/P EST MOD 30 MIN: CPT | Performed by: INTERNAL MEDICINE

## 2020-04-28 PROCEDURE — 1123F ACP DISCUSS/DSCN MKR DOCD: CPT | Performed by: INTERNAL MEDICINE

## 2020-04-28 PROCEDURE — 3017F COLORECTAL CA SCREEN DOC REV: CPT | Performed by: INTERNAL MEDICINE

## 2020-04-28 PROCEDURE — 3052F HG A1C>EQUAL 8.0%<EQUAL 9.0%: CPT | Performed by: INTERNAL MEDICINE

## 2020-04-28 PROCEDURE — 2022F DILAT RTA XM EVC RTNOPTHY: CPT | Performed by: INTERNAL MEDICINE

## 2020-04-28 RX ORDER — OMEPRAZOLE 20 MG/1
CAPSULE, DELAYED RELEASE ORAL
Qty: 90 CAPSULE | Refills: 3 | Status: SHIPPED | OUTPATIENT
Start: 2020-04-28 | End: 2021-07-06

## 2020-04-28 RX ORDER — LANCETS 30 GAUGE
1 EACH MISCELLANEOUS 3 TIMES DAILY
Qty: 100 EACH | Refills: 5 | Status: SHIPPED | OUTPATIENT
Start: 2020-04-28 | End: 2021-06-22

## 2020-04-28 RX ORDER — BLOOD-GLUCOSE METER
1 EACH MISCELLANEOUS DAILY
Qty: 1 KIT | Refills: 0 | Status: SHIPPED | OUTPATIENT
Start: 2020-04-28

## 2020-04-28 ASSESSMENT — ENCOUNTER SYMPTOMS: COUGH: 0

## 2020-04-28 NOTE — PROGRESS NOTES
Heather Terrell MD   losartan (COZAAR) 50 MG tablet Take 1 tablet by mouth daily  Jose D Mclain MD   amLODIPine (NORVASC) 10 MG tablet TAKE ONE TABLET BY MOUTH DAILY  Jose D Mclain MD   lovastatin (MEVACOR) 20 MG tablet TAKE ONE TABLET BY MOUTH ONCE NIGHTLY  Jose D Mclain MD   sitaGLIPtan-metformin (JANUMET)  MG per tablet Take 1 tablet by mouth 2 times daily (with meals)  Jose D Mclain MD   tamsulosin (FLOMAX) 0.4 MG capsule Take 1 capsule by mouth daily  Jose D Mclain MD   terazosin (HYTRIN) 2 MG capsule Take 1 capsule by mouth daily  Jose D Mclain MD   zoster recombinant adjuvanted vaccine [facility]) 50 MCG/0.5ML SUSR injection Inject 0.5 mLs into the muscle See Admin Instructions 1 dose now and repeat in 2-6 months  Patient not taking: Reported on 2/6/2020  Jose D Mclain MD   Ascorbic Acid (VITAMIN C) 1000 MG tablet Take 1 tablet by mouth daily  Jose D Mclain MD   Cholecalciferol (VITAMIN D3) 50 MCG (2000 UT) CAPS Take 1 capsule by mouth daily  Jose D Mclain MD   glipiZIDE (GLUCOTROL) 5 MG tablet Take 1 tablet by mouth daily  Jose D Mclain MD   Omega-3 Fatty Acids (FISH OIL) 1000 MG CAPS Take 1 capsule by mouth daily  Jose D Mclain MD   glucose monitoring kit (FREESTYLE) monitoring kit 1 kit by Does not apply route daily  Jose D Mclain MD   FREESTYLE LANCETS MISC 1 each by Does not apply route 2 times daily  Jose D Mclain MD   Multiple Vitamins-Minerals (THERAPEUTIC MULTIVITAMIN-MINERALS) tablet Take 1 tablet by mouth daily  Historical Provider, MD       Social History     Tobacco Use    Smoking status: Never Smoker    Smokeless tobacco: Never Used   Substance Use Topics    Alcohol use: No    Drug use: No     PHYSICAL EXAMINATION:    Vital Signs: (As obtained by patient/caregiver or practitioner observation)    Blood pressure- 151/90 Heart rate-    Respiratory rate-    Temperature-  Pulse oximetry-     Constitutional: [x] Appears well-developed

## 2020-05-07 ENCOUNTER — TELEPHONE (OUTPATIENT)
Dept: FAMILY MEDICINE CLINIC | Age: 65
End: 2020-05-07

## 2020-05-07 NOTE — TELEPHONE ENCOUNTER
PT got the new meter, he says he feels like its not reading right. About an hour before eating or drinking when he wakes up, its around 200. He does not think this is right, I advised him to take it back to the pharmacy. Before he takes it to the pharm, he's going to run his blood on both old and new meter's.

## 2020-05-07 NOTE — TELEPHONE ENCOUNTER
Dr Elbert Lynn, pt states you were wanting to speak with him RE: blood sugar.  Please call Kenny Garg back at [phone]

## 2020-05-07 NOTE — TELEPHONE ENCOUNTER
I wont have much time to call him this afternoon -reach out to him to find out what hes noticing or sched vv next week

## 2020-06-08 ENCOUNTER — TELEPHONE (OUTPATIENT)
Dept: FAMILY MEDICINE CLINIC | Age: 65
End: 2020-06-08

## 2020-07-27 RX ORDER — GLIPIZIDE 5 MG/1
TABLET ORAL
Qty: 90 TABLET | Refills: 3 | Status: SHIPPED | OUTPATIENT
Start: 2020-07-27 | End: 2020-09-14

## 2020-08-03 ENCOUNTER — TELEPHONE (OUTPATIENT)
Dept: FAMILY MEDICINE CLINIC | Age: 65
End: 2020-08-03

## 2020-08-03 NOTE — TELEPHONE ENCOUNTER
Requesting a referral to an eye doctor due to DM. Please advise.  Please call Mallory Brunson back at [phone]

## 2020-08-04 ENCOUNTER — TELEPHONE (OUTPATIENT)
Dept: FAMILY MEDICINE CLINIC | Age: 65
End: 2020-08-04

## 2020-08-04 ENCOUNTER — NURSE ONLY (OUTPATIENT)
Dept: FAMILY MEDICINE CLINIC | Age: 65
End: 2020-08-04
Payer: MEDICARE

## 2020-08-04 VITALS — DIASTOLIC BLOOD PRESSURE: 88 MMHG | OXYGEN SATURATION: 95 % | SYSTOLIC BLOOD PRESSURE: 158 MMHG | HEART RATE: 77 BPM

## 2020-08-04 LAB
BASOPHILS ABSOLUTE: 0 K/UL (ref 0–0.2)
BASOPHILS RELATIVE PERCENT: 0.4 %
C-REACTIVE PROTEIN: 3.4 MG/L (ref 0–5.1)
EOSINOPHILS ABSOLUTE: 0.2 K/UL (ref 0–0.6)
EOSINOPHILS RELATIVE PERCENT: 3.4 %
HCT VFR BLD CALC: 39 % (ref 40.5–52.5)
HEMOGLOBIN: 13.3 G/DL (ref 13.5–17.5)
LYMPHOCYTES ABSOLUTE: 1.4 K/UL (ref 1–5.1)
LYMPHOCYTES RELATIVE PERCENT: 22.1 %
MCH RBC QN AUTO: 29.4 PG (ref 26–34)
MCHC RBC AUTO-ENTMCNC: 34 G/DL (ref 31–36)
MCV RBC AUTO: 86.5 FL (ref 80–100)
MONOCYTES ABSOLUTE: 0.4 K/UL (ref 0–1.3)
MONOCYTES RELATIVE PERCENT: 6.3 %
NEUTROPHILS ABSOLUTE: 4.4 K/UL (ref 1.7–7.7)
NEUTROPHILS RELATIVE PERCENT: 67.8 %
PDW BLD-RTO: 14.5 % (ref 12.4–15.4)
PLATELET # BLD: 235 K/UL (ref 135–450)
PMV BLD AUTO: 7.6 FL (ref 5–10.5)
RBC # BLD: 4.51 M/UL (ref 4.2–5.9)
SEDIMENTATION RATE, ERYTHROCYTE: 50 MM/HR (ref 0–20)
WBC # BLD: 6.5 K/UL (ref 4–11)

## 2020-08-04 PROCEDURE — 36415 COLL VENOUS BLD VENIPUNCTURE: CPT | Performed by: INTERNAL MEDICINE

## 2020-08-04 NOTE — TELEPHONE ENCOUNTER
Lindsey told me he has an appt with [facility] middle of sept. I will call them and see if I can get him in this week. Spoke to [facility]. They will be calling back tomorrow.

## 2020-08-04 NOTE — TELEPHONE ENCOUNTER
[facility] won't see pt until labs are back. -INFLAMMATORY MARKERS MEASURED ASAP. -ELEVATED SIGNS AND SYMPTOMS NEEDS IV STEROIDS. If the symptoms match up and it's GCA. He needs prednisone  If pt is looking not well, he needs to be admitted.      Per Dr. Ben Ceja    PT coming up today to do labs

## 2020-08-04 NOTE — TELEPHONE ENCOUNTER
Dr. Kitty Hernandez calling for Dr. Rohini Dobson. I spoke with her and she said it's fine to call her back. She is wanting to know if you'd like her to refer pt to an urgent opthalmology office or if you could order blood work and biopsy's for the below issue. Left eye - loss of vision & color vision  Swollen nerve in back of eye  2200  No pain in jaw or scalp  Shooting pains on top of head but would go away  Worried about Giant cell arthritis or temporal arthritis. She did want to discuss this with you when you have time. Best call back for Dr. Jose Mcdonald  [phone]    That is just the office number, so let me know when and I'll get her on the line for you.

## 2020-08-04 NOTE — TELEPHONE ENCOUNTER
Please call up to [facility] and see if they can see today - we can also do labs here if they need us to - ESR, c-reactive protein, cbc

## 2020-08-05 ENCOUNTER — TELEPHONE (OUTPATIENT)
Dept: FAMILY MEDICINE CLINIC | Age: 65
End: 2020-08-05

## 2020-08-05 NOTE — TELEPHONE ENCOUNTER
States faxes are not going through correctly with pt's results. Wanting to know if you will call her with the results.  Her number is [phone]

## 2020-08-05 NOTE — TELEPHONE ENCOUNTER
Sherwin Cardenas called back, after faxing 3 times. Went ahead and gave verbal of the sed rate and CRP .

## 2020-08-05 NOTE — TELEPHONE ENCOUNTER
Felisha with the [facility] called in regarding the labs that PT had done yesterday. She was looking for the results of a couple of them.      Please call Felisha back: [phone]

## 2020-08-11 ENCOUNTER — TELEPHONE (OUTPATIENT)
Dept: FAMILY MEDICINE CLINIC | Age: 65
End: 2020-08-11

## 2020-08-11 NOTE — TELEPHONE ENCOUNTER
PT's wife called in stating that PT has an appointment scheduled for Monday @ 11:45 however when PT was discharged they scheduled him with another appointment @ 11:30 in [facility].  They would like to know when PT can be rescheduled for (Dr. Gallagher Samples schedule is full.)     Please call back: [phone]

## 2020-08-11 NOTE — TELEPHONE ENCOUNTER
PT is rescheduled for the next day.  Spoke to Gladys Merino, the hospital scheduled 3 appt's on Monday for him after she scheduled his original follow up

## 2020-08-18 ENCOUNTER — OFFICE VISIT (OUTPATIENT)
Dept: FAMILY MEDICINE CLINIC | Age: 65
End: 2020-08-18
Payer: MEDICARE

## 2020-08-18 VITALS
SYSTOLIC BLOOD PRESSURE: 142 MMHG | BODY MASS INDEX: 26.9 KG/M2 | DIASTOLIC BLOOD PRESSURE: 80 MMHG | OXYGEN SATURATION: 98 % | HEART RATE: 92 BPM | RESPIRATION RATE: 16 BRPM | WEIGHT: 179 LBS

## 2020-08-18 PROCEDURE — 99495 TRANSJ CARE MGMT MOD F2F 14D: CPT | Performed by: INTERNAL MEDICINE

## 2020-08-18 PROCEDURE — 1111F DSCHRG MED/CURRENT MED MERGE: CPT | Performed by: INTERNAL MEDICINE

## 2020-08-18 ASSESSMENT — ENCOUNTER SYMPTOMS
VOMITING: 0
WHEEZING: 0
EYE REDNESS: 0
SINUS PRESSURE: 0
SORE THROAT: 0
DIARRHEA: 0
EYE DISCHARGE: 0
SHORTNESS OF BREATH: 0
COUGH: 0
NAUSEA: 0
ABDOMINAL PAIN: 0
SINUS PAIN: 0
RHINORRHEA: 0

## 2020-08-18 NOTE — PROGRESS NOTES
glucose monitoring kit (FREESTYLE) monitoring kit  1 kit by Does not apply route daily             Lancets MISC  1 each by Does not apply route 3 times daily             losartan (COZAAR) 50 MG tablet  Take 1 tablet by mouth daily             lovastatin (MEVACOR) 20 MG tablet  TAKE ONE TABLET BY MOUTH ONCE NIGHTLY             Multiple Vitamins-Minerals (THERAPEUTIC MULTIVITAMIN-MINERALS) tablet  Take 1 tablet by mouth daily             Omega-3 Fatty Acids (FISH OIL) 1000 MG CAPS  Take 1 capsule by mouth daily             omeprazole (PRILOSEC) 20 MG delayed release capsule  TAKE ONE CAPSULE BY MOUTH DAILY             sitaGLIPtan-metformin (JANUMET)  MG per tablet  Take 1 tablet by mouth 2 times daily (with meals)             tamsulosin (FLOMAX) 0.4 MG capsule  Take 1 capsule by mouth daily             terazosin (HYTRIN) 2 MG capsule  Take 1 capsule by mouth daily                   Medications marked \"taking\" at this time  Outpatient Medications Marked as Taking for the 8/18/20 encounter (Office Visit) with Skyler Sargent MD   Medication Sig Dispense Refill    glipiZIDE (GLUCOTROL) 5 MG tablet TAKE ONE TABLET BY MOUTH EVERY EVENING 90 tablet 3    Blood Glucose Monitoring Suppl (ONE TOUCH ULTRA 2) w/Device KIT 1 kit by Does not apply route daily 1 kit 0    blood glucose test strips (ASCENSIA AUTODISC VI;ONE TOUCH ULTRA TEST VI) strip 1 each by In Vitro route daily As needed.  100 each 3    omeprazole (PRILOSEC) 20 MG delayed release capsule TAKE ONE CAPSULE BY MOUTH DAILY 90 capsule 3    Lancets MISC 1 each by Does not apply route 3 times daily 100 each 5    losartan (COZAAR) 50 MG tablet Take 1 tablet by mouth daily 90 tablet 3    amLODIPine (NORVASC) 10 MG tablet TAKE ONE TABLET BY MOUTH DAILY 90 tablet 3    lovastatin (MEVACOR) 20 MG tablet TAKE ONE TABLET BY MOUTH ONCE NIGHTLY 90 tablet 3    sitaGLIPtan-metformin (JANUMET)  MG per tablet Take 1 tablet by mouth 2 times daily (with meals) 180 tablet 3    tamsulosin (FLOMAX) 0.4 MG capsule Take 1 capsule by mouth daily 90 capsule 3    terazosin (HYTRIN) 2 MG capsule Take 1 capsule by mouth daily 90 capsule 3    Ascorbic Acid (VITAMIN C) 1000 MG tablet Take 1 tablet by mouth daily 90 tablet 3    Cholecalciferol (VITAMIN D3) 50 MCG (2000 UT) CAPS Take 1 capsule by mouth daily 90 capsule 3    Omega-3 Fatty Acids (FISH OIL) 1000 MG CAPS Take 1 capsule by mouth daily 90 capsule 3    glucose monitoring kit (FREESTYLE) monitoring kit 1 kit by Does not apply route daily 1 kit 0    FREESTYLE LANCETS MISC 1 each by Does not apply route 2 times daily 100 each 5    Multiple Vitamins-Minerals (THERAPEUTIC MULTIVITAMIN-MINERALS) tablet Take 1 tablet by mouth daily          Medications patient taking as of now reconciled against medications ordered at time of hospital discharge: Yes    Chief Complaint   Patient presents with    Follow-Up from [facility]  [facility] [address] Problem       HPI    Inpatient course: Discharge summary reviewed- see chart. 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 He received high-dose IV steroids. He was sent home on prednisone orally. His temporal artery biopsy came back negative. He followed up with rheumatology yesterday. They are tapering off his steroids. His blood pressure and blood sugar have been elevated while on the steroids. They have been monitoring this regularly. He has a log with him today. Blood pressure is typically running 140s over 80s but occasionally has some high numbers up to 702 systolic in the evenings. His fasting blood sugar has also been running 1 20-1 50 in the morning but then elevated in the evening up to 300. He has been taking all his medications. Unfortunately, he has not seen any improvement in his vision. He still has low vision bilaterally.   He does not recall having any headaches related to the vision change. He reports the change in his vision was gradual over several weeks. He has follow-up with ophthalmology on Friday. He was also noted to have some renal insufficiency in the hospital.  He is following up with nephrology on Monday. Other than the visual disturbance, he has no physical complaints      Review of Systems   Constitutional: Negative for chills and fever. HENT: Negative for congestion, ear pain, rhinorrhea, sinus pressure, sinus pain and sore throat. Eyes: Positive for visual disturbance. Negative for discharge and redness. Respiratory: Negative for cough, shortness of breath and wheezing. Cardiovascular: Negative for chest pain, palpitations and leg swelling. Gastrointestinal: Negative for abdominal pain, diarrhea, nausea and vomiting. Genitourinary: Negative for dysuria. Musculoskeletal: Negative for myalgias. Skin: Negative for rash. Vitals:    08/18/20 1151   BP: (!) 142/80   Pulse: 92   Resp: 16   SpO2: 98%   Weight: 179 lb (81.2 kg)     Body mass index is 26.9 kg/m². Wt Readings from Last 3 Encounters:   08/18/20 179 lb (81.2 kg)   02/06/20 189 lb (85.7 kg)   11/07/19 184 lb 3.2 oz (83.6 kg)     BP Readings from Last 3 Encounters:   08/18/20 (!) 142/80   08/04/20 (!) 158/88   02/06/20 (!) 140/88       Physical Exam  Constitutional:       General: He is not in acute distress. Appearance: Normal appearance. HENT:      Head: Normocephalic and atraumatic. Cardiovascular:      Rate and Rhythm: Normal rate and regular rhythm. Heart sounds: No murmur. Pulmonary:      Effort: Pulmonary effort is normal. No respiratory distress. Breath sounds: No wheezing or rales. Skin:     General: Skin is warm and dry. Neurological:      General: No focal deficit present. Mental Status: He is alert. Assessment/Plan:  1. Hospital discharge follow-up    - UT DISCHARGE MEDS RECONCILED W/ CURRENT OUTPATIENT MED LIST    2.  Vision loss  Unclear etiology. He will see ophthalmology this week. I will follow along. Continue steroid taper per rheumatology-patient states that they will be tapering for him    3. Type 2 diabetes mellitus without complication, without long-term current use of insulin (HCC)  Hyperglycemia related to steroids. Recommend increasing the glipizide to twice daily until he is off the steroids. Continue to monitor and keep blood sugar log. Close follow-up with me 1 to 2 weeks after cessation of the steroid to see if meds need further adjustment    4. Essential hypertension  Borderline elevated. Recommend he take an extra dose of losartan for any blood pressures greater than 160/90. Certainly, he has a bit of renal dysfunction and long-term use of the losartan should be considered. We will await his follow-up with the nephrologist.  Would be desirable to have him on an ACE or arm given his diabetes    5.  Stage 3 chronic kidney disease ([facility] [address]  As above        Medical Decision Making: moderate complexity

## 2020-08-20 RX ORDER — LOVASTATIN 20 MG/1
TABLET ORAL
Qty: 90 TABLET | Refills: 3 | Status: SHIPPED
Start: 2020-08-20 | End: 2021-06-24 | Stop reason: DRUGHIGH

## 2020-09-14 ENCOUNTER — OFFICE VISIT (OUTPATIENT)
Dept: FAMILY MEDICINE CLINIC | Age: 65
End: 2020-09-14
Payer: MEDICARE

## 2020-09-14 VITALS
HEART RATE: 118 BPM | RESPIRATION RATE: 15 BRPM | DIASTOLIC BLOOD PRESSURE: 68 MMHG | SYSTOLIC BLOOD PRESSURE: 120 MMHG | OXYGEN SATURATION: 97 % | WEIGHT: 175 LBS | BODY MASS INDEX: 26.3 KG/M2

## 2020-09-14 PROCEDURE — 99214 OFFICE O/P EST MOD 30 MIN: CPT | Performed by: INTERNAL MEDICINE

## 2020-09-14 PROCEDURE — 2022F DILAT RTA XM EVC RTNOPTHY: CPT | Performed by: INTERNAL MEDICINE

## 2020-09-14 PROCEDURE — 4040F PNEUMOC VAC/ADMIN/RCVD: CPT | Performed by: INTERNAL MEDICINE

## 2020-09-14 PROCEDURE — G8427 DOCREV CUR MEDS BY ELIG CLIN: HCPCS | Performed by: INTERNAL MEDICINE

## 2020-09-14 PROCEDURE — 3052F HG A1C>EQUAL 8.0%<EQUAL 9.0%: CPT | Performed by: INTERNAL MEDICINE

## 2020-09-14 PROCEDURE — 1036F TOBACCO NON-USER: CPT | Performed by: INTERNAL MEDICINE

## 2020-09-14 PROCEDURE — 1123F ACP DISCUSS/DSCN MKR DOCD: CPT | Performed by: INTERNAL MEDICINE

## 2020-09-14 PROCEDURE — G8417 CALC BMI ABV UP PARAM F/U: HCPCS | Performed by: INTERNAL MEDICINE

## 2020-09-14 PROCEDURE — 3017F COLORECTAL CA SCREEN DOC REV: CPT | Performed by: INTERNAL MEDICINE

## 2020-09-14 RX ORDER — GLIPIZIDE 5 MG/1
TABLET ORAL
Qty: 270 TABLET | Refills: 1 | Status: SHIPPED | OUTPATIENT
Start: 2020-09-14 | End: 2020-10-12 | Stop reason: SDUPTHER

## 2020-09-14 RX ORDER — PREDNISONE 20 MG/1
30 TABLET ORAL DAILY
COMMUNITY
Start: 2020-08-18 | End: 2021-06-22

## 2020-09-14 RX ORDER — HYDRALAZINE HYDROCHLORIDE 25 MG/1
25 TABLET, FILM COATED ORAL 3 TIMES DAILY
COMMUNITY
Start: 2020-08-10 | End: 2020-11-23 | Stop reason: SDUPTHER

## 2020-09-14 ASSESSMENT — ENCOUNTER SYMPTOMS
NAUSEA: 0
ABDOMINAL PAIN: 0
WHEEZING: 0
SORE THROAT: 0
SINUS PRESSURE: 0
DIARRHEA: 0
VOMITING: 0
EYE DISCHARGE: 0
EYE REDNESS: 0
SINUS PAIN: 0
SHORTNESS OF BREATH: 0
RHINORRHEA: 0
COUGH: 0

## 2020-09-14 NOTE — PROGRESS NOTES
2020     Meet Garcia (:  1955) is a 75 y.o. male, here for evaluation of the following medical concerns:    HPI  Patient presents for follow-up to review his blood sugar readings. He still on high-dose steroids for suspected GCA and recent vision loss. He still follows with ophthalmologist.  He is also following with nephrology. His blood sugar is reasonably well controlled in the mornings but he notices higher readings in the evenings. Typically 1  fasting but up to 200-2 50 when he checks in the evening. He has been very diligent about his diet and has actually lost weight on the high-dose steroids. He has not had any improvement in his vision. His blood pressure has been reasonably well-controlled at home as well, looks better here today. Currently on amlodipine, hydralazine, losartan. Review of Systems   Constitutional: Negative for chills and fever. HENT: Negative for congestion, ear pain, rhinorrhea, sinus pressure, sinus pain and sore throat. Eyes: Negative for discharge and redness. Respiratory: Negative for cough, shortness of breath and wheezing. Cardiovascular: Negative for chest pain, palpitations and leg swelling. Gastrointestinal: Negative for abdominal pain, diarrhea, nausea and vomiting. Genitourinary: Negative for dysuria. Musculoskeletal: Negative for myalgias. Skin: Negative for rash. Prior to Visit Medications    Medication Sig Taking?  Authorizing Provider   predniSONE (DELTASONE) 20 MG tablet Take 40 mg by mouth daily Yes Historical Provider, MD   hydrALAZINE (APRESOLINE) 25 MG tablet Take 25 mg by mouth 3 times daily Yes Historical Provider, MD   glipiZIDE (GLUCOTROL) 5 MG tablet Take 2 tablets in AM and 1 tablet in PM Yes Myra Renee MD   lovastatin (MEVACOR) 20 MG tablet TAKE ONE TABLET BY MOUTH ONCE NIGHTLY Yes Myra Renee MD   Blood Glucose Monitoring Suppl (ONE TOUCH ULTRA 2) w/Device KIT 1 kit by Does not apply (79.4 kg). Physical Exam  Constitutional:       General: He is not in acute distress. Appearance: Normal appearance. HENT:      Head: Normocephalic and atraumatic. Cardiovascular:      Rate and Rhythm: Normal rate and regular rhythm. Heart sounds: No murmur. Pulmonary:      Effort: Pulmonary effort is normal. No respiratory distress. Breath sounds: Normal breath sounds. No wheezing or rales. Musculoskeletal:      Right lower leg: No edema. Left lower leg: No edema. Neurological:      General: No focal deficit present. Mental Status: He is alert. Psychiatric:         Mood and Affect: Mood normal.         ASSESSMENT/PLAN:  1. Type 2 diabetes mellitus without complication, without long-term current use of insulin ([address]  He does not want to do any injectable medications. Will avoid SGLT2 inhibitors at this time given the kidney issues. We will plan to add an evening dose of glipizide at this time. Hopefully, once he is off the steroids he can go back to his regular medication regimen as he has been doing very well with his diet and weight management. Close follow-up with me in 1 month to review his blood sugar readings again. He will call in the interim with any issues    2. Essential hypertension  Reasonably well-controlled at this time, no changes made today    3. Stage 3 chronic kidney disease ([address]  Follow-up with nephrology as planned. Avoid nephrotoxins. We will need to dose adjust any additional diabetic medications    4. Vision loss  Per ophthalmology, still treating as GCA. Biopsy was negative. Return in about 1 month (around 10/14/2020). An electronic signature was used to authenticate this note.     --Dimitri Padilla MD on 9/14/2020 at 10:29 AM

## 2020-10-12 ENCOUNTER — OFFICE VISIT (OUTPATIENT)
Dept: FAMILY MEDICINE CLINIC | Age: 65
End: 2020-10-12
Payer: MEDICARE

## 2020-10-12 VITALS
BODY MASS INDEX: 26.6 KG/M2 | OXYGEN SATURATION: 100 % | RESPIRATION RATE: 17 BRPM | DIASTOLIC BLOOD PRESSURE: 78 MMHG | SYSTOLIC BLOOD PRESSURE: 138 MMHG | WEIGHT: 177 LBS | HEART RATE: 102 BPM

## 2020-10-12 PROBLEM — M31.6 GCA (GIANT CELL ARTERITIS) (HCC): Status: ACTIVE | Noted: 2020-10-12

## 2020-10-12 PROCEDURE — 4040F PNEUMOC VAC/ADMIN/RCVD: CPT | Performed by: INTERNAL MEDICINE

## 2020-10-12 PROCEDURE — 99213 OFFICE O/P EST LOW 20 MIN: CPT | Performed by: INTERNAL MEDICINE

## 2020-10-12 PROCEDURE — 1123F ACP DISCUSS/DSCN MKR DOCD: CPT | Performed by: INTERNAL MEDICINE

## 2020-10-12 PROCEDURE — 3052F HG A1C>EQUAL 8.0%<EQUAL 9.0%: CPT | Performed by: INTERNAL MEDICINE

## 2020-10-12 PROCEDURE — G8427 DOCREV CUR MEDS BY ELIG CLIN: HCPCS | Performed by: INTERNAL MEDICINE

## 2020-10-12 PROCEDURE — 2022F DILAT RTA XM EVC RTNOPTHY: CPT | Performed by: INTERNAL MEDICINE

## 2020-10-12 PROCEDURE — 3017F COLORECTAL CA SCREEN DOC REV: CPT | Performed by: INTERNAL MEDICINE

## 2020-10-12 PROCEDURE — G8417 CALC BMI ABV UP PARAM F/U: HCPCS | Performed by: INTERNAL MEDICINE

## 2020-10-12 PROCEDURE — G8484 FLU IMMUNIZE NO ADMIN: HCPCS | Performed by: INTERNAL MEDICINE

## 2020-10-12 PROCEDURE — 1036F TOBACCO NON-USER: CPT | Performed by: INTERNAL MEDICINE

## 2020-10-12 RX ORDER — GLIPIZIDE 5 MG/1
TABLET ORAL
Qty: 360 TABLET | Refills: 3 | Status: SHIPPED | OUTPATIENT
Start: 2020-10-12 | End: 2021-11-03

## 2020-10-12 ASSESSMENT — ENCOUNTER SYMPTOMS
VOMITING: 0
SHORTNESS OF BREATH: 0
SINUS PAIN: 0
RHINORRHEA: 0
SORE THROAT: 0
COUGH: 0
EYE DISCHARGE: 0
ABDOMINAL PAIN: 0
NAUSEA: 0
SINUS PRESSURE: 0
WHEEZING: 0
DIARRHEA: 0
EYE REDNESS: 0

## 2020-10-12 NOTE — PROGRESS NOTES
10/12/2020     Claudia Valenzuela (:  1955) is a 75 y.o. male, here for evaluation of the following medical concerns:    Diabetes   Pertinent negatives for diabetes include no chest pain. Patient presents for follow-up to review his blood sugar readings. He still on high-dose steroids for suspected GCA and recent vision loss however, now decreased to 30 mg per day. He is starting to see some improvement in his blood sugars with this change. He has another appoint with ophthalmologist next week and is hoping they will decrease it further. Carlos Alberto Farr His blood sugar is reasonably well controlled in the mornings but he notices higher readings in the evenings. Typically  fasting but up to 200-300 when he checks in the evening. He has been very diligent about his diet. Walking several miles per day. He has had some modest improvement in his vision, especially in the right eye. He is now able to do more of his activities. Still not driving. Is able to take long walks on his own now as well which is an improvement. He otherwise feels well. Review of Systems   Constitutional: Negative for chills and fever. HENT: Negative for congestion, ear pain, rhinorrhea, sinus pressure, sinus pain and sore throat. Eyes: Negative for discharge and redness. Respiratory: Negative for cough, shortness of breath and wheezing. Cardiovascular: Negative for chest pain, palpitations and leg swelling. Gastrointestinal: Negative for abdominal pain, diarrhea, nausea and vomiting. Genitourinary: Negative for dysuria. Musculoskeletal: Negative for myalgias. Skin: Negative for rash. Prior to Visit Medications    Medication Sig Taking?  Authorizing Provider   glipiZIDE (GLUCOTROL) 5 MG tablet Take 2 tablets twice daily Yes Haydee Covarrubias MD   predniSONE (DELTASONE) 20 MG tablet Take 30 mg by mouth daily  Yes Historical Provider, MD   hydrALAZINE (APRESOLINE) 25 MG tablet Take 25 mg by mouth 3 times daily Yes Historical Provider, MD   lovastatin (MEVACOR) 20 MG tablet TAKE ONE TABLET BY MOUTH ONCE NIGHTLY Yes Destiny Musa MD   Blood Glucose Monitoring Suppl (ONE TOUCH ULTRA 2) w/Device KIT 1 kit by Does not apply route daily Yes Destiny Musa MD   blood glucose test strips (ASCENSIA AUTODISC VI;ONE TOUCH ULTRA TEST VI) strip 1 each by In Vitro route daily As needed.  Yes Destiny Musa MD   omeprazole (PRILOSEC) 20 MG delayed release capsule TAKE ONE CAPSULE BY MOUTH DAILY Yes Destiny Musa MD   Lancets MISC 1 each by Does not apply route 3 times daily Yes Destiny Musa MD   losartan (COZAAR) 50 MG tablet Take 1 tablet by mouth daily Yes Destiny Musa MD   amLODIPine (NORVASC) 10 MG tablet TAKE ONE TABLET BY MOUTH DAILY Yes Destiny Musa MD   sitaGLIPtan-metformin (JANUMET)  MG per tablet Take 1 tablet by mouth 2 times daily (with meals) Yes Destiny Musa MD   tamsulosin (FLOMAX) 0.4 MG capsule Take 1 capsule by mouth daily Yes Destiny Musa MD   Cholecalciferol (VITAMIN D3) 50 MCG (2000 UT) CAPS Take 1 capsule by mouth daily Yes Destiny Musa MD   Omega-3 Fatty Acids (FISH OIL) 1000 MG CAPS Take 1 capsule by mouth daily Yes Destiny Musa MD   glucose monitoring kit (FREESTYLE) monitoring kit 1 kit by Does not apply route daily Yes Destiny Musa MD   FREESTYLE LANCETS MISC 1 each by Does not apply route 2 times daily Yes Destiny Musa MD   Multiple Vitamins-Minerals (THERAPEUTIC MULTIVITAMIN-MINERALS) tablet Take 1 tablet by mouth daily Yes Historical Provider, MD   terazosin (HYTRIN) 2 MG capsule Take 1 capsule by mouth daily  Patient not taking: Reported on 10/12/2020  Destiny Musa MD   Ascorbic Acid (VITAMIN C) 1000 MG tablet Take 1 tablet by mouth daily  Patient not taking: Reported on 10/12/2020  Destiny Musa MD        Social History     Tobacco Use    Smoking status: Never Smoker    Smokeless tobacco: Never Used   Substance Use Topics    Alcohol use: No        Vitals:    10/12/20 1015   BP: 138/78   Pulse: 102   Resp: 17   SpO2: 100%   Weight: 177 lb (80.3 kg)     Estimated body mass index is 26.6 kg/m² as calculated from the following:    Height as of 11/7/19: 5' 8.4\" (1.737 m). Weight as of this encounter: 177 lb (80.3 kg). Physical Exam  Constitutional:       General: He is not in acute distress. Appearance: Normal appearance. HENT:      Head: Normocephalic and atraumatic. Cardiovascular:      Rate and Rhythm: Normal rate and regular rhythm. Heart sounds: No murmur. Pulmonary:      Effort: Pulmonary effort is normal. No respiratory distress. Breath sounds: Normal breath sounds. No wheezing or rales. Musculoskeletal:      Right lower leg: No edema. Left lower leg: No edema. Neurological:      General: No focal deficit present. Mental Status: He is alert. Psychiatric:         Mood and Affect: Mood normal.         ASSESSMENT/PLAN:  1. Type 2 diabetes mellitus without complication, without long-term current use of insulin ([address]  He does not want to do any injectable medications. Will avoid SGLT2 inhibitors at this time given the kidney issues. We will plan to increase his evening dose of glipizide at this time. . he will notify me should he develop any hyopglycemia. Hopefully, once he is off the steroids he can go back to his regular medication regimen as he has been doing very well with his diet and weight management. Close follow-up  in 1 month to review his blood sugar readings again. He will call in the interim with any issues    2. GCA (giant cell arteritis) (HCC)  Continue steroids per ophtho. Hold off on flu shot til next visit given moderate dose steroid. Return in about 4 weeks (around 11/9/2020). An electronic signature was used to authenticate this note.     --Vanessa Newton MD on 10/12/2020 at 10:55 AM

## 2020-10-28 ENCOUNTER — TELEPHONE (OUTPATIENT)
Dept: FAMILY MEDICINE CLINIC | Age: 65
End: 2020-10-28

## 2020-10-28 NOTE — TELEPHONE ENCOUNTER
I can sign it if it is filled out    Just bring to me when ready    Please document call and then close encounter.   thanks

## 2020-10-28 NOTE — TELEPHONE ENCOUNTER
PT came into the office with a [employer] Patient [employer] Program Enrollment Form that he needs filled out. Placed form in Elvira's basket.

## 2020-11-22 PROBLEM — E11.65 UNCONTROLLED TYPE 2 DIABETES MELLITUS WITH HYPERGLYCEMIA (HCC): Status: ACTIVE | Noted: 2020-11-22

## 2020-11-22 PROBLEM — E78.2 MIXED HYPERLIPIDEMIA: Status: ACTIVE | Noted: 2020-11-22

## 2020-11-22 PROBLEM — A41.9 SEPSIS (HCC): Status: RESOLVED | Noted: 2017-05-29 | Resolved: 2020-11-22

## 2020-11-23 ENCOUNTER — OFFICE VISIT (OUTPATIENT)
Dept: FAMILY MEDICINE CLINIC | Age: 65
End: 2020-11-23
Payer: MEDICARE

## 2020-11-23 VITALS
WEIGHT: 178 LBS | HEART RATE: 111 BPM | OXYGEN SATURATION: 96 % | TEMPERATURE: 97.2 F | DIASTOLIC BLOOD PRESSURE: 88 MMHG | RESPIRATION RATE: 16 BRPM | SYSTOLIC BLOOD PRESSURE: 138 MMHG | BODY MASS INDEX: 26.75 KG/M2

## 2020-11-23 LAB
A/G RATIO: 2.1 (ref 1.1–2.2)
ALBUMIN SERPL-MCNC: 4.2 G/DL (ref 3.4–5)
ALP BLD-CCNC: 65 U/L (ref 40–129)
ALT SERPL-CCNC: 16 U/L (ref 10–40)
ANION GAP SERPL CALCULATED.3IONS-SCNC: 11 MMOL/L (ref 3–16)
AST SERPL-CCNC: 13 U/L (ref 15–37)
BILIRUB SERPL-MCNC: 0.7 MG/DL (ref 0–1)
BUN BLDV-MCNC: 28 MG/DL (ref 7–20)
CALCIUM SERPL-MCNC: 9.7 MG/DL (ref 8.3–10.6)
CHLORIDE BLD-SCNC: 98 MMOL/L (ref 99–110)
CO2: 29 MMOL/L (ref 21–32)
CREAT SERPL-MCNC: 1.4 MG/DL (ref 0.8–1.3)
GFR AFRICAN AMERICAN: >60
GFR NON-AFRICAN AMERICAN: 51
GLOBULIN: 2 G/DL
GLUCOSE BLD-MCNC: 211 MG/DL (ref 70–99)
HBA1C MFR BLD: 7.2 %
POTASSIUM SERPL-SCNC: 3.9 MMOL/L (ref 3.5–5.1)
SEDIMENTATION RATE, ERYTHROCYTE: 50 MM/HR (ref 0–20)
SODIUM BLD-SCNC: 138 MMOL/L (ref 136–145)
TOTAL PROTEIN: 6.2 G/DL (ref 6.4–8.2)

## 2020-11-23 PROCEDURE — G8484 FLU IMMUNIZE NO ADMIN: HCPCS | Performed by: FAMILY MEDICINE

## 2020-11-23 PROCEDURE — 99214 OFFICE O/P EST MOD 30 MIN: CPT | Performed by: FAMILY MEDICINE

## 2020-11-23 PROCEDURE — 4040F PNEUMOC VAC/ADMIN/RCVD: CPT | Performed by: FAMILY MEDICINE

## 2020-11-23 PROCEDURE — G8417 CALC BMI ABV UP PARAM F/U: HCPCS | Performed by: FAMILY MEDICINE

## 2020-11-23 PROCEDURE — 36415 COLL VENOUS BLD VENIPUNCTURE: CPT | Performed by: FAMILY MEDICINE

## 2020-11-23 PROCEDURE — 3051F HG A1C>EQUAL 7.0%<8.0%: CPT | Performed by: FAMILY MEDICINE

## 2020-11-23 PROCEDURE — G8428 CUR MEDS NOT DOCUMENT: HCPCS | Performed by: FAMILY MEDICINE

## 2020-11-23 PROCEDURE — 3017F COLORECTAL CA SCREEN DOC REV: CPT | Performed by: FAMILY MEDICINE

## 2020-11-23 PROCEDURE — 83036 HEMOGLOBIN GLYCOSYLATED A1C: CPT | Performed by: FAMILY MEDICINE

## 2020-11-23 PROCEDURE — 1036F TOBACCO NON-USER: CPT | Performed by: FAMILY MEDICINE

## 2020-11-23 PROCEDURE — 2022F DILAT RTA XM EVC RTNOPTHY: CPT | Performed by: FAMILY MEDICINE

## 2020-11-23 PROCEDURE — 1123F ACP DISCUSS/DSCN MKR DOCD: CPT | Performed by: FAMILY MEDICINE

## 2020-11-23 RX ORDER — HYDRALAZINE HYDROCHLORIDE 25 MG/1
25 TABLET, FILM COATED ORAL 3 TIMES DAILY
Qty: 270 TABLET | Refills: 3 | Status: SHIPPED | OUTPATIENT
Start: 2020-11-23 | End: 2022-06-08

## 2020-11-23 NOTE — PROGRESS NOTES
Chief Complaint   Patient presents with    Follow-up    Diabetes     Family History   Problem Relation Age of Onset    Kidney Disease Mother     Diabetes Mother     Cancer Brother      Social History     Socioeconomic History    Marital status:      Spouse name: Not on file    Number of children: Not on file    Years of education: Not on file    Highest education level: Not on file   Occupational History    Occupation: retired   Social Needs    Financial resource strain: Not on file    Food insecurity     Worry: Not on file     Inability: Not on file   [employer] needs     Medical: Not on file     Non-medical: Not on file   Tobacco Use    Smoking status: Never Smoker    Smokeless tobacco: Never Used   Substance and Sexual Activity    Alcohol use: No    Drug use: No    Sexual activity: Not on file   Lifestyle    Physical activity     Days per week: Not on file     Minutes per session: Not on file    Stress: Not on file   Relationships    Social connections     Talks on phone: Not on file     Gets together: Not on file     Attends Congregation service: Not on file     Active member of club or organization: Not on file     Attends meetings of clubs or organizations: Not on file     Relationship status: Not on file    Intimate partner violence     Fear of current or ex partner: Not on file     Emotionally abused: Not on file     Physically abused: Not on file     Forced sexual activity: Not on file   Other Topics Concern    Not on file   Social History Narrative    Not on file       Current Outpatient Medications:     glipiZIDE (GLUCOTROL) 5 MG tablet, Take 2 tablets twice daily, Disp: 360 tablet, Rfl: 3    predniSONE (DELTASONE) 20 MG tablet, Take 30 mg by mouth daily , Disp: , Rfl:     hydrALAZINE (APRESOLINE) 25 MG tablet, Take 25 mg by mouth 3 times daily, Disp: , Rfl:     lovastatin (MEVACOR) 20 MG tablet, TAKE ONE TABLET BY MOUTH ONCE NIGHTLY, Disp: 90 tablet, Rfl: 3    Blood Glucose Monitoring Suppl (ONE TOUCH ULTRA 2) w/Device KIT, 1 kit by Does not apply route daily, Disp: 1 kit, Rfl: 0    blood glucose test strips (ASCENSIA AUTODISC VI;ONE TOUCH ULTRA TEST VI) strip, 1 each by In Vitro route daily As needed. , Disp: 100 each, Rfl: 3    omeprazole (PRILOSEC) 20 MG delayed release capsule, TAKE ONE CAPSULE BY MOUTH DAILY, Disp: 90 capsule, Rfl: 3    Lancets MISC, 1 each by Does not apply route 3 times daily, Disp: 100 each, Rfl: 5    losartan (COZAAR) 50 MG tablet, Take 1 tablet by mouth daily, Disp: 90 tablet, Rfl: 3    amLODIPine (NORVASC) 10 MG tablet, TAKE ONE TABLET BY MOUTH DAILY, Disp: 90 tablet, Rfl: 3    sitaGLIPtan-metformin (JANUMET)  MG per tablet, Take 1 tablet by mouth 2 times daily (with meals), Disp: 180 tablet, Rfl: 3    tamsulosin (FLOMAX) 0.4 MG capsule, Take 1 capsule by mouth daily, Disp: 90 capsule, Rfl: 3    Cholecalciferol (VITAMIN D3) 50 MCG (2000 UT) CAPS, Take 1 capsule by mouth daily, Disp: 90 capsule, Rfl: 3    Omega-3 Fatty Acids (FISH OIL) 1000 MG CAPS, Take 1 capsule by mouth daily, Disp: 90 capsule, Rfl: 3    glucose monitoring kit (FREESTYLE) monitoring kit, 1 kit by Does not apply route daily, Disp: 1 kit, Rfl: 0    FREESTYLE LANCETS MISC, 1 each by Does not apply route 2 times daily, Disp: 100 each, Rfl: 5    Multiple Vitamins-Minerals (THERAPEUTIC MULTIVITAMIN-MINERALS) tablet, Take 1 tablet by mouth daily, Disp: , Rfl:     Ascorbic Acid (VITAMIN C) 1000 MG tablet, Take 1 tablet by mouth daily (Patient not taking: Reported on 11/23/2020), Disp: 90 tablet, Rfl: 3  No Known Allergies    Patient Active Problem List   Diagnosis    Urinary retention    Essential hypertension    BPH (benign prostatic hyperplasia)    Ureterolithiasis    GCA (giant cell arteritis) (HCC)    Uncontrolled type 2 diabetes mellitus with hyperglycemia (HCC)    Mixed hyperlipidemia       HPI / ROS: Sharin Hammans presents for evaluation and management of :    # DM2  Lab Results   Component Value Date    LABA1C 7.2 11/23/2020     Lab Results   Component Value Date    LABMICR YES 06/18/2019     He is here for closer follow up after recent steroid use for GCA      # GCA now off steroids feels OK no new sx   Lab Results   Component Value Date    SEDRATE 50 (H) 08/04/2020     # HTN - elieser meds no CP/SOB  Lab Results   Component Value Date     08/15/2019    K 4.6 08/15/2019    K 3.8 06/25/2019     08/15/2019    CO2 26 08/15/2019    BUN 27 08/15/2019    CREATININE 1.0 08/15/2019    GLUCOSE 234 08/15/2019    CALCIUM 10.4 08/15/2019       # Hyperlipidemia on statin elieser this no myalgias or weakness  Lab Results   Component Value Date    LDLCALC 79 08/15/2019      Lab Results   Component Value Date    ALT 15 08/15/2019    AST 15 08/15/2019    ALKPHOS 90 08/15/2019    BILITOT 0.6 08/15/2019      # LUTS / Nocturia - improved on alpha blocker per pt      Wt Readings from Last 3 Encounters:   11/23/20 178 lb (80.7 kg)   10/12/20 177 lb (80.3 kg)   09/14/20 175 lb (79.4 kg)         A&o  /88   Pulse 111   Temp 97.2 °F (36.2 °C)   Resp 16   Wt 178 lb (80.7 kg)   SpO2 96%   BMI 26.75 kg/m²   Neck no bruit no TMg  Car reg no MGR  Lungs cta  Ext no edema       Diagnosis Orders   1. Uncontrolled type 2 diabetes mellitus with hyperglycemia (HCC)  Comprehensive Metabolic Panel    POCT glycosylated hemoglobin (Hb A1C)    a1c today =  7.2 ; improve down from 8.0 FE 2020lcheck renal; consider LEONID next time when steroid use more remote   2. GCA (giant cell arteritis) (HCC)      improevd sx better tapering off steroids   3. Mixed hyperlipidemia  Comprehensive Metabolic Panel    LFTs only on statin; will defer recheck lipids until better a1c control as above   4. Essential hypertension  Comprehensive Metabolic Panel    at goal arb check renal   5. Need for 23-polyvalent pneumococcal polysaccharide vaccine      declines for now   6.  Needs flu shot      declines for now Orders Placed This Encounter   Procedures    Comprehensive Metabolic Panel    POCT glycosylated hemoglobin (Hb A1C)

## 2020-12-21 RX ORDER — ACETAMINOPHEN 160 MG
TABLET,DISINTEGRATING ORAL
Qty: 90 CAPSULE | Refills: 2 | Status: SHIPPED | OUTPATIENT
Start: 2020-12-21 | Stop reason: SDUPTHER

## 2021-01-27 RX ORDER — LOSARTAN POTASSIUM 25 MG/1
TABLET ORAL
Qty: 90 TABLET | Refills: 3 | Status: SHIPPED | OUTPATIENT
Start: 2021-01-27 | End: 2021-03-22

## 2021-01-30 ENCOUNTER — TELEPHONE (OUTPATIENT)
Dept: FAMILY MEDICINE CLINIC | Age: 66
End: 2021-01-30

## 2021-02-01 RX ORDER — BLOOD-GLUCOSE METER
EACH MISCELLANEOUS
Qty: 100 STRIP | Refills: 5 | Status: SHIPPED | OUTPATIENT
Start: 2021-02-01 | End: 2021-02-02

## 2021-02-01 NOTE — TELEPHONE ENCOUNTER
PT called in regarding the refill for his test strips. HE says that per the pharmacy that the directions state to test once a day but PT says that he tests twice a day. Once in the morning and once in the evening. He says that dr. Rao Bhatt should be aware that he tests twice a day and he needs a new prescription stating that he tests twice a day.      Best call back number: [phone]

## 2021-02-02 RX ORDER — BLOOD-GLUCOSE METER
EACH MISCELLANEOUS
Qty: 100 STRIP | Refills: 5 | Status: SHIPPED | OUTPATIENT
Start: 2021-02-02 | End: 2022-04-20 | Stop reason: SDUPTHER

## 2021-02-22 RX ORDER — TAMSULOSIN HYDROCHLORIDE 0.4 MG/1
CAPSULE ORAL
Qty: 90 CAPSULE | Refills: 2 | Status: SHIPPED | OUTPATIENT
Start: 2021-02-22 | End: 2021-12-20 | Stop reason: SDUPTHER

## 2021-03-22 ENCOUNTER — OFFICE VISIT (OUTPATIENT)
Dept: FAMILY MEDICINE CLINIC | Age: 66
End: 2021-03-22
Payer: MEDICARE

## 2021-03-22 VITALS
HEART RATE: 103 BPM | BODY MASS INDEX: 28.34 KG/M2 | DIASTOLIC BLOOD PRESSURE: 81 MMHG | OXYGEN SATURATION: 98 % | TEMPERATURE: 99.1 F | SYSTOLIC BLOOD PRESSURE: 140 MMHG | RESPIRATION RATE: 17 BRPM | WEIGHT: 188.6 LBS

## 2021-03-22 DIAGNOSIS — E78.2 MIXED HYPERLIPIDEMIA: Primary | ICD-10-CM

## 2021-03-22 DIAGNOSIS — E11.9 TYPE 2 DIABETES MELLITUS WITHOUT COMPLICATION, WITHOUT LONG-TERM CURRENT USE OF INSULIN (HCC): ICD-10-CM

## 2021-03-22 DIAGNOSIS — M31.6 GCA (GIANT CELL ARTERITIS) (HCC): ICD-10-CM

## 2021-03-22 DIAGNOSIS — I10 ESSENTIAL HYPERTENSION: ICD-10-CM

## 2021-03-22 LAB — HBA1C MFR BLD: 7.1 %

## 2021-03-22 PROCEDURE — G8427 DOCREV CUR MEDS BY ELIG CLIN: HCPCS | Performed by: INTERNAL MEDICINE

## 2021-03-22 PROCEDURE — 1036F TOBACCO NON-USER: CPT | Performed by: INTERNAL MEDICINE

## 2021-03-22 PROCEDURE — 4040F PNEUMOC VAC/ADMIN/RCVD: CPT | Performed by: INTERNAL MEDICINE

## 2021-03-22 PROCEDURE — 83036 HEMOGLOBIN GLYCOSYLATED A1C: CPT | Performed by: INTERNAL MEDICINE

## 2021-03-22 PROCEDURE — 99214 OFFICE O/P EST MOD 30 MIN: CPT | Performed by: INTERNAL MEDICINE

## 2021-03-22 PROCEDURE — 1123F ACP DISCUSS/DSCN MKR DOCD: CPT | Performed by: INTERNAL MEDICINE

## 2021-03-22 PROCEDURE — G8484 FLU IMMUNIZE NO ADMIN: HCPCS | Performed by: INTERNAL MEDICINE

## 2021-03-22 PROCEDURE — G8417 CALC BMI ABV UP PARAM F/U: HCPCS | Performed by: INTERNAL MEDICINE

## 2021-03-22 PROCEDURE — 2022F DILAT RTA XM EVC RTNOPTHY: CPT | Performed by: INTERNAL MEDICINE

## 2021-03-22 PROCEDURE — 3017F COLORECTAL CA SCREEN DOC REV: CPT | Performed by: INTERNAL MEDICINE

## 2021-03-22 PROCEDURE — 3051F HG A1C>EQUAL 7.0%<8.0%: CPT | Performed by: INTERNAL MEDICINE

## 2021-03-22 RX ORDER — LOSARTAN POTASSIUM 50 MG/1
50 TABLET ORAL DAILY
Qty: 90 TABLET | Refills: 3 | Status: SHIPPED | OUTPATIENT
Start: 2021-03-22 | End: 2022-03-18 | Stop reason: SDUPTHER

## 2021-03-22 ASSESSMENT — PATIENT HEALTH QUESTIONNAIRE - PHQ9
2. FEELING DOWN, DEPRESSED OR HOPELESS: 1
SUM OF ALL RESPONSES TO PHQ QUESTIONS 1-9: 2
1. LITTLE INTEREST OR PLEASURE IN DOING THINGS: 1
SUM OF ALL RESPONSES TO PHQ QUESTIONS 1-9: 2
SUM OF ALL RESPONSES TO PHQ9 QUESTIONS 1 & 2: 2
SUM OF ALL RESPONSES TO PHQ QUESTIONS 1-9: 2

## 2021-03-22 NOTE — TELEPHONE ENCOUNTER
Seen today, calling RE: losartan-- has been taking 25 mg.  States to go ahead and call the 50 mg dosage to [facility]

## 2021-03-22 NOTE — PROGRESS NOTES
Dexter Gunter (:  1955) is a 77 y.o. male,Established patient, here for evaluation of the following chief complaint(s):  Follow-up and Diabetes      ASSESSMENT/PLAN:  1. Mixed hyperlipidemia  Tolerating well, continue statin therapy  2. Essential hypertension  Recommend increasing his losartan as his blood pressure remains slightly above goal.  He will notify me should any adverse effects develop  3. GCA (giant cell arteritis) ([facility] [address]  Follow-up with ophthalmology as planned. Unfortunately, has not had seen much change in his vision with the steroids. Unclear when they will be discontinued. Expect glycemic control will improve at that time  4. Type 2 diabetes mellitus without complication, without long-term current use of insulin ([facility])  Recommend decreasing glipizide to 1 tablet twice daily given intermittent hypoglycemia, this is the likely cause. You may need to get him off glipizide altogether once he is off the steroids. No other changes to management today  -     POCT glycosylated hemoglobin (Hb A1C)      Return in about 3 months (around 2021). SUBJECTIVE/OBJECTIVE:  HPI  Patient presents for routine follow-up care of type 2 diabetes, hypertension, hyperlipidemia, giant cell arteritis. He brings his blood pressure and blood sugar log with him today. His blood sugar has been improving slowly over the last several months as they are tapering down his steroids. He has follow-up with his ophthalmologist next month. He is not sure if they are going to take him off the steroids at that point or not. He has not seen much improvement in his vision. His blood sugars typically running less than 160. He is very diligent about following a diabetic diet. His A1c is 7.1 today. He occasionally notices some hypoglycemia. His blood pressure has been well controlled on current medications. Typically running 140/80 at home.   No recent chest pain, shortness of breath, edema, lightheadedness,

## 2021-03-25 ASSESSMENT — ENCOUNTER SYMPTOMS
SHORTNESS OF BREATH: 0
ABDOMINAL PAIN: 0
DIARRHEA: 0

## 2021-05-03 DIAGNOSIS — M25.512 SHOULDER PAIN, LEFT: ICD-10-CM

## 2021-05-03 DIAGNOSIS — M75.20 BICEPS TENDONITIS: ICD-10-CM

## 2021-05-03 RX ORDER — AMLODIPINE BESYLATE 10 MG/1
TABLET ORAL
Qty: 90 TABLET | Refills: 2 | Status: SHIPPED | OUTPATIENT
Start: 2021-05-03 | End: 2022-02-08

## 2021-05-27 LAB — DIABETIC RETINOPATHY: NEGATIVE

## 2021-06-22 ENCOUNTER — OFFICE VISIT (OUTPATIENT)
Dept: FAMILY MEDICINE CLINIC | Age: 66
End: 2021-06-22
Payer: MEDICARE

## 2021-06-22 ENCOUNTER — TELEPHONE (OUTPATIENT)
Dept: FAMILY MEDICINE CLINIC | Age: 66
End: 2021-06-22

## 2021-06-22 VITALS
RESPIRATION RATE: 16 BRPM | OXYGEN SATURATION: 96 % | WEIGHT: 192.8 LBS | DIASTOLIC BLOOD PRESSURE: 78 MMHG | SYSTOLIC BLOOD PRESSURE: 130 MMHG | BODY MASS INDEX: 29.22 KG/M2 | HEART RATE: 111 BPM | TEMPERATURE: 97.4 F | HEIGHT: 68 IN

## 2021-06-22 DIAGNOSIS — R00.0 TACHYCARDIA: ICD-10-CM

## 2021-06-22 DIAGNOSIS — E78.2 MIXED HYPERLIPIDEMIA: ICD-10-CM

## 2021-06-22 DIAGNOSIS — E11.9 TYPE 2 DIABETES MELLITUS WITHOUT COMPLICATION, WITHOUT LONG-TERM CURRENT USE OF INSULIN (HCC): Primary | ICD-10-CM

## 2021-06-22 DIAGNOSIS — M31.6 GCA (GIANT CELL ARTERITIS) (HCC): ICD-10-CM

## 2021-06-22 DIAGNOSIS — I10 ESSENTIAL HYPERTENSION: ICD-10-CM

## 2021-06-22 LAB
A/G RATIO: 2 (ref 1.1–2.2)
ALBUMIN SERPL-MCNC: 4.9 G/DL (ref 3.4–5)
ALP BLD-CCNC: 70 U/L (ref 40–129)
ALT SERPL-CCNC: 14 U/L (ref 10–40)
ANION GAP SERPL CALCULATED.3IONS-SCNC: 20 MMOL/L (ref 3–16)
AST SERPL-CCNC: 18 U/L (ref 15–37)
BASOPHILS ABSOLUTE: 0.1 K/UL (ref 0–0.2)
BASOPHILS RELATIVE PERCENT: 0.6 %
BILIRUB SERPL-MCNC: 0.6 MG/DL (ref 0–1)
BUN BLDV-MCNC: 25 MG/DL (ref 7–20)
CALCIUM SERPL-MCNC: 10.9 MG/DL (ref 8.3–10.6)
CHLORIDE BLD-SCNC: 101 MMOL/L (ref 99–110)
CHOLESTEROL, TOTAL: 211 MG/DL (ref 0–199)
CO2: 21 MMOL/L (ref 21–32)
CREAT SERPL-MCNC: 1.3 MG/DL (ref 0.8–1.3)
EOSINOPHILS ABSOLUTE: 0.2 K/UL (ref 0–0.6)
EOSINOPHILS RELATIVE PERCENT: 1.7 %
GFR AFRICAN AMERICAN: >60
GFR NON-AFRICAN AMERICAN: 55
GLOBULIN: 2.4 G/DL
GLUCOSE BLD-MCNC: 159 MG/DL (ref 70–99)
HCT VFR BLD CALC: 37.4 % (ref 40.5–52.5)
HDLC SERPL-MCNC: 40 MG/DL (ref 40–60)
HEMOGLOBIN: 12.8 G/DL (ref 13.5–17.5)
LDL CHOLESTEROL CALCULATED: ABNORMAL MG/DL
LDL CHOLESTEROL DIRECT: 116 MG/DL
LYMPHOCYTES ABSOLUTE: 1.3 K/UL (ref 1–5.1)
LYMPHOCYTES RELATIVE PERCENT: 13.3 %
MCH RBC QN AUTO: 30 PG (ref 26–34)
MCHC RBC AUTO-ENTMCNC: 34.1 G/DL (ref 31–36)
MCV RBC AUTO: 87.9 FL (ref 80–100)
MONOCYTES ABSOLUTE: 0.7 K/UL (ref 0–1.3)
MONOCYTES RELATIVE PERCENT: 6.9 %
NEUTROPHILS ABSOLUTE: 7.7 K/UL (ref 1.7–7.7)
NEUTROPHILS RELATIVE PERCENT: 77.5 %
PDW BLD-RTO: 15.8 % (ref 12.4–15.4)
PLATELET # BLD: 209 K/UL (ref 135–450)
PMV BLD AUTO: 7.7 FL (ref 5–10.5)
POTASSIUM SERPL-SCNC: 4.9 MMOL/L (ref 3.5–5.1)
RBC # BLD: 4.25 M/UL (ref 4.2–5.9)
SODIUM BLD-SCNC: 142 MMOL/L (ref 136–145)
TOTAL PROTEIN: 7.3 G/DL (ref 6.4–8.2)
TRIGL SERPL-MCNC: 428 MG/DL (ref 0–150)
TSH REFLEX: 2.06 UIU/ML (ref 0.27–4.2)
VLDLC SERPL CALC-MCNC: ABNORMAL MG/DL
WBC # BLD: 9.9 K/UL (ref 4–11)

## 2021-06-22 PROCEDURE — 3051F HG A1C>EQUAL 7.0%<8.0%: CPT | Performed by: INTERNAL MEDICINE

## 2021-06-22 PROCEDURE — 4040F PNEUMOC VAC/ADMIN/RCVD: CPT | Performed by: INTERNAL MEDICINE

## 2021-06-22 PROCEDURE — 1036F TOBACCO NON-USER: CPT | Performed by: INTERNAL MEDICINE

## 2021-06-22 PROCEDURE — 1123F ACP DISCUSS/DSCN MKR DOCD: CPT | Performed by: INTERNAL MEDICINE

## 2021-06-22 PROCEDURE — 2022F DILAT RTA XM EVC RTNOPTHY: CPT | Performed by: INTERNAL MEDICINE

## 2021-06-22 PROCEDURE — G8427 DOCREV CUR MEDS BY ELIG CLIN: HCPCS | Performed by: INTERNAL MEDICINE

## 2021-06-22 PROCEDURE — 99214 OFFICE O/P EST MOD 30 MIN: CPT | Performed by: INTERNAL MEDICINE

## 2021-06-22 PROCEDURE — G8417 CALC BMI ABV UP PARAM F/U: HCPCS | Performed by: INTERNAL MEDICINE

## 2021-06-22 PROCEDURE — 93000 ELECTROCARDIOGRAM COMPLETE: CPT | Performed by: INTERNAL MEDICINE

## 2021-06-22 PROCEDURE — 3017F COLORECTAL CA SCREEN DOC REV: CPT | Performed by: INTERNAL MEDICINE

## 2021-06-22 PROCEDURE — 36415 COLL VENOUS BLD VENIPUNCTURE: CPT | Performed by: INTERNAL MEDICINE

## 2021-06-22 RX ORDER — SENNOSIDES 8.6 MG
650 CAPSULE ORAL EVERY 8 HOURS PRN
Status: ON HOLD | COMMUNITY
End: 2022-06-28 | Stop reason: HOSPADM

## 2021-06-22 RX ORDER — LANCETS 33 GAUGE
EACH MISCELLANEOUS
Qty: 100 EACH | Refills: 5 | Status: SHIPPED | OUTPATIENT
Start: 2021-06-22 | End: 2022-09-21 | Stop reason: SDUPTHER

## 2021-06-22 ASSESSMENT — ENCOUNTER SYMPTOMS
ABDOMINAL PAIN: 0
SHORTNESS OF BREATH: 0
DIARRHEA: 0

## 2021-06-22 NOTE — PROGRESS NOTES
Catherine Arauz. (:  1955) is a 77 y.o. male,Established patient, here for evaluation of the following chief complaint(s):  3 Month Follow-Up      ASSESSMENT/PLAN  1. Type 2 diabetes mellitus without complication, without long-term current use of insulin (HCC)  Ischemic control continues to improve. No ongoing hypoglycemia with medication adjustment. Continue present management. 2. Tachycardia  Appears to be aflutter 3:1. rec cardiology eval - staff contacted office and ekg reviewed by cardiologist, appt given for tomorrow afternoon. Since this has likely been going on for many years, can wait for appt tomorrow. However, pt given strict instruction regarding when to seek medical attention for new symptoms including chest pain, sob. Also advised to take full dose aspirin until evaluation / informed discussion with cardiology tomorrow. He voiced agreement/understanding of plan   - EKG 12 Doc Otoole MD, [facility], [facility]  - CBC Auto Differential  - Comprehensive Metabolic Panel  - TSH with Reflex    3. Mixed hyperlipidemia  continue statin, fu labs  - Lipid Panel    4. Essential hypertension  At goal with med adjustment. continue    5. GCA (giant cell arteritis) ([facility] [address]  F/u with optho as planned. Down to 5 mg prednisone        Return in about 3 months (around 2021). SUBJECTIVE/OBJECTIVE:  HPI  Patient presents for routine follow-up care of type 2 diabetes, hypertension, hyperlipidemia, giant cell arteritis. He brings his blood pressure and blood sugar log with him today. His blood sugar has been improving slowly over the last several months since prednisone was decreased to 5 mg daily. No more hypoglycemia with decrease in the glipizide dose. He has not seen much improvement in his vision. He was told by the eye doctor recently that they think he may have had a \"mini stroke\". His blood sugar is typically running less than 160.   He is very diligent about following a diabetic diet. His A1c is 6.8 graciela. His blood pressure has been well controlled on current medications, losartan increased at last visit. No recent chest pain, shortness of breath, edema, lightheadedness, dizziness. Remains on statin without abdominal symptoms or myalgias. Tachycardia noted today on his blood sugar log. On further review, present for quite some time. He states that it has been like that for many years. He does not recall a time when his heart rate was ever consistently below 100. He denies any recent chest pain. He denies any palpitations but states that he does feel his heart beating rapidly when he is doing activities such as mowing the lawn. Again, he has noted this for many years. No lightheadedness or dizziness. No major cardiac history. He recalls his heart rate being elevated during hospitalizations and was on a monitor, was never told he had any abnormal rhythm. Review of Systems   Respiratory: Negative for shortness of breath. Cardiovascular: Negative for chest pain, palpitations and leg swelling. Gastrointestinal: Negative for abdominal pain and diarrhea. Musculoskeletal: Negative for myalgias. Physical Exam  Constitutional:       Appearance: Normal appearance. HENT:      Head: Normocephalic and atraumatic. Cardiovascular:      Rate and Rhythm: Regular rhythm. Tachycardia present. Heart sounds: No murmur heard. Pulmonary:      Effort: Pulmonary effort is normal. No respiratory distress. Breath sounds: Normal breath sounds. No wheezing or rales. Musculoskeletal:      Right lower leg: No edema. Left lower leg: No edema. Neurological:      Mental Status: He is alert. An electronic signature was used to authenticate this note.     --Laly Bhatt MD

## 2021-06-22 NOTE — TELEPHONE ENCOUNTER
FYI--Called [employer] [phone] for eligibility. I spoke with Aide Stanton.  She verified pt is currently active on insurance coverage

## 2021-06-23 ENCOUNTER — OFFICE VISIT (OUTPATIENT)
Dept: CARDIOLOGY CLINIC | Age: 66
End: 2021-06-23
Payer: MEDICARE

## 2021-06-23 VITALS
SYSTOLIC BLOOD PRESSURE: 132 MMHG | DIASTOLIC BLOOD PRESSURE: 78 MMHG | HEART RATE: 108 BPM | WEIGHT: 191.6 LBS | BODY MASS INDEX: 29.04 KG/M2 | OXYGEN SATURATION: 98 % | HEIGHT: 68 IN

## 2021-06-23 DIAGNOSIS — R94.31 ABNORMAL EKG: ICD-10-CM

## 2021-06-23 DIAGNOSIS — D64.9 ANEMIA, UNSPECIFIED TYPE: Primary | ICD-10-CM

## 2021-06-23 DIAGNOSIS — R00.0 SINUS TACHYCARDIA: Primary | ICD-10-CM

## 2021-06-23 DIAGNOSIS — E83.52 SERUM CALCIUM ELEVATED: Primary | ICD-10-CM

## 2021-06-23 DIAGNOSIS — E11.9 TYPE 2 DIABETES MELLITUS WITHOUT COMPLICATION, WITHOUT LONG-TERM CURRENT USE OF INSULIN (HCC): ICD-10-CM

## 2021-06-23 PROCEDURE — 2022F DILAT RTA XM EVC RTNOPTHY: CPT | Performed by: INTERNAL MEDICINE

## 2021-06-23 PROCEDURE — G8427 DOCREV CUR MEDS BY ELIG CLIN: HCPCS | Performed by: INTERNAL MEDICINE

## 2021-06-23 PROCEDURE — G8417 CALC BMI ABV UP PARAM F/U: HCPCS | Performed by: INTERNAL MEDICINE

## 2021-06-23 PROCEDURE — 93000 ELECTROCARDIOGRAM COMPLETE: CPT | Performed by: INTERNAL MEDICINE

## 2021-06-23 PROCEDURE — 99204 OFFICE O/P NEW MOD 45 MIN: CPT | Performed by: INTERNAL MEDICINE

## 2021-06-23 RX ORDER — ASPIRIN 81 MG/1
81 TABLET ORAL DAILY
Status: ON HOLD | COMMUNITY
End: 2022-06-28 | Stop reason: HOSPADM

## 2021-06-23 NOTE — PROGRESS NOTES
[address] [facility]  1955    June 23, 2021    Reason for Consult: Abnormal EKG     CC: \"I always have a higher heart rate\"     HPI:  The patient is 77 y.o. male with a past medical history significant for hypertension, type 2 diabetes and possible prior TIA. He was told by his ophthalmologist that he has limited blood flow behind his left eye and this could be related to a prior TIA. He presents as referral from Dr. Carlo Borden regarding abnormal EKG. He states he has had a chronic history of a \"faster heart rate\". He reports being active with his daily life and participates in yard work. He will walk around the block as well. He denies any exertional chest pain or shortness of breath. He states his heart rate will become elevated at times with exertion. This will resolve with rest and drinking water. Review of Systems:  Constitutional: No fatigue, weakness, night sweats or fever. HEENT: No new vision difficulties or ringing in the ears. Respiratory: No new SOB, PND, orthopnea or cough. Cardiovascular: See HPI   GI: No n/v, diarrhea, constipation, abdominal pain or changes in bowel habits. No melena, no hematochezia  : No urinary frequency, urgency, incontinence, hematuria or dysuria. Skin: No cyanosis or skin lesions. Musculoskeletal: No new muscle or joint pain. Neurological: No syncope or TIA-like symptoms.   Psychiatric: No anxiety, insomnia or depression     Past Medical History:   Diagnosis Date    Diabetes mellitus ([address]     High blood pressure     Urinary stone 06/18/2019    stent placed 6/18/2019     Past Surgical History:   Procedure Laterality Date    APPENDECTOMY  01/01/1984    CYSTOSCOPY Left 6/18/2019    CYSTOSCOPY LEFT URETERAL STENT INSERTION performed by Russ Singh MD at [facility] [place] History   Problem Relation Age of Onset    Kidney Disease Mother     Diabetes Mother     Cancer Brother      Social History K 4.9 06/22/2021    K 3.8 06/25/2019    BUN 25 06/22/2021    CREATININE 1.3 06/22/2021     Assessment:  1. Sinus tachycardia  2. Essential hypertension   3. Type 2 diabetes, without insulin use or long term complications    Plan:  His EKG completed yesterday in the office with Dr. Juan Daniel Trevizo was sinus tachycardia and not atrial flutter as the computer interpreted. His EKG completed today revealed sinus tachycardia as well. His blood pressure is well controlled today in the office. I have encouraged him in risk factor modification with increased aerobic activity and adhering to a heart healthy diet. I have personally reviewed all previous testing for this visit today including imaging, lab results and EKG as detailed above. I can see him at any time for new cardiac symptoms. This note was scribed in the presence of Mukul Walton MD by [employer], RN. Physician Attestation:  The scribes documentation has been prepared under my direction and personally reviewed by me in its entirety. I, Dr. Leann Payne personally performed the services described in this documentation as scribed by my RN in my presence, and I confirm that the note above accurately reflects all work, treatment, procedures, and medical decision making performed by me.

## 2021-06-24 ENCOUNTER — NURSE ONLY (OUTPATIENT)
Dept: FAMILY MEDICINE CLINIC | Age: 66
End: 2021-06-24

## 2021-06-24 ENCOUNTER — TELEPHONE (OUTPATIENT)
Dept: FAMILY MEDICINE CLINIC | Age: 66
End: 2021-06-24

## 2021-06-24 DIAGNOSIS — E83.52 SERUM CALCIUM ELEVATED: ICD-10-CM

## 2021-06-24 DIAGNOSIS — D64.9 ANEMIA, UNSPECIFIED TYPE: ICD-10-CM

## 2021-06-24 DIAGNOSIS — E78.2 MIXED HYPERLIPIDEMIA: Primary | ICD-10-CM

## 2021-06-24 LAB
FERRITIN: 16 NG/ML (ref 30–400)
VITAMIN D 25-HYDROXY: 56.2 NG/ML

## 2021-06-24 RX ORDER — LOVASTATIN 40 MG/1
40 TABLET ORAL NIGHTLY
Qty: 30 TABLET | Refills: 3 | Status: SHIPPED | OUTPATIENT
Start: 2021-06-24 | Stop reason: SDUPTHER

## 2021-06-24 NOTE — TELEPHONE ENCOUNTER
Please call patient and let him know I did send in an increased dose of lovastatin 40 mg nightly to his pharmacy. He should also discontinue his calcium supplement. Please document call and then close encounter.   thanks

## 2021-06-25 LAB
IRON SATURATION: 17 % (ref 20–50)
IRON: 61 UG/DL (ref 59–158)
PARATHYROID HORMONE INTACT: 19 PG/ML (ref 14–72)
TOTAL IRON BINDING CAPACITY: 353 UG/DL (ref 260–445)

## 2021-06-25 RX ORDER — LANOLIN ALCOHOL/MO/W.PET/CERES
325 CREAM (GRAM) TOPICAL
Qty: 90 TABLET | Refills: 2 | Status: SHIPPED | OUTPATIENT
Start: 2021-06-25 | End: 2021-09-23

## 2021-06-26 LAB
CALCIUM IONIZED, CALC AT PH 7.4: 1.38 MMOL/L (ref 1.11–1.3)
CALCIUM IONIZED: 1.42 MMOL/L (ref 1.11–1.3)

## 2021-07-01 ENCOUNTER — TELEPHONE (OUTPATIENT)
Dept: FAMILY MEDICINE CLINIC | Age: 66
End: 2021-07-01

## 2021-07-01 NOTE — TELEPHONE ENCOUNTER
PT called in regarding his lab results from 6/24. PT says that he has stopped taking his calcium supplement.

## 2021-07-06 RX ORDER — OMEPRAZOLE 20 MG/1
CAPSULE, DELAYED RELEASE ORAL
Qty: 90 CAPSULE | Refills: 2 | Status: SHIPPED | OUTPATIENT
Start: 2021-07-06 | End: 2022-03-28 | Stop reason: SDUPTHER

## 2021-07-07 ENCOUNTER — TELEPHONE (OUTPATIENT)
Dept: FAMILY MEDICINE CLINIC | Age: 66
End: 2021-07-07

## 2021-07-07 ENCOUNTER — NURSE ONLY (OUTPATIENT)
Dept: FAMILY MEDICINE CLINIC | Age: 66
End: 2021-07-07
Payer: MEDICARE

## 2021-07-07 DIAGNOSIS — D50.9 IRON DEFICIENCY ANEMIA, UNSPECIFIED IRON DEFICIENCY ANEMIA TYPE: ICD-10-CM

## 2021-07-07 DIAGNOSIS — Z79.52 CURRENT CHRONIC USE OF SYSTEMIC STEROIDS: ICD-10-CM

## 2021-07-07 DIAGNOSIS — D64.9 ANEMIA, UNSPECIFIED TYPE: ICD-10-CM

## 2021-07-07 DIAGNOSIS — R19.5 POSITIVE FIT (FECAL IMMUNOCHEMICAL TEST): Primary | ICD-10-CM

## 2021-07-07 LAB
CONTROL: NORMAL
HEMOCCULT STL QL: NORMAL

## 2021-07-07 PROCEDURE — 82274 ASSAY TEST FOR BLOOD FECAL: CPT | Performed by: INTERNAL MEDICINE

## 2021-08-12 DIAGNOSIS — M75.20 BICEPS TENDONITIS: ICD-10-CM

## 2021-08-12 DIAGNOSIS — M25.512 SHOULDER PAIN, LEFT: ICD-10-CM

## 2021-08-12 RX ORDER — SITAGLIPTIN AND METFORMIN HYDROCHLORIDE 1000; 50 MG/1; MG/1
TABLET, FILM COATED ORAL
Qty: 180 TABLET | Refills: 1 | Status: SHIPPED | OUTPATIENT
Start: 2021-08-12 | End: 2022-02-15 | Stop reason: ALTCHOICE

## 2021-08-31 ENCOUNTER — TELEPHONE (OUTPATIENT)
Dept: FAMILY MEDICINE CLINIC | Age: 66
End: 2021-08-31

## 2021-09-17 ENCOUNTER — OFFICE VISIT (OUTPATIENT)
Dept: FAMILY MEDICINE CLINIC | Age: 66
End: 2021-09-17
Payer: MEDICARE

## 2021-09-17 VITALS
DIASTOLIC BLOOD PRESSURE: 84 MMHG | HEART RATE: 97 BPM | WEIGHT: 193 LBS | HEIGHT: 68 IN | BODY MASS INDEX: 29.25 KG/M2 | OXYGEN SATURATION: 98 % | RESPIRATION RATE: 16 BRPM | SYSTOLIC BLOOD PRESSURE: 138 MMHG

## 2021-09-17 DIAGNOSIS — E78.5 HYPERLIPIDEMIA, UNSPECIFIED HYPERLIPIDEMIA TYPE: ICD-10-CM

## 2021-09-17 DIAGNOSIS — Z12.5 PROSTATE CANCER SCREENING: ICD-10-CM

## 2021-09-17 DIAGNOSIS — I10 ESSENTIAL HYPERTENSION: ICD-10-CM

## 2021-09-17 DIAGNOSIS — Z00.00 ROUTINE GENERAL MEDICAL EXAMINATION AT A HEALTH CARE FACILITY: Primary | ICD-10-CM

## 2021-09-17 DIAGNOSIS — Z51.81 THERAPEUTIC DRUG MONITORING: ICD-10-CM

## 2021-09-17 DIAGNOSIS — E11.65 UNCONTROLLED TYPE 2 DIABETES MELLITUS WITH HYPERGLYCEMIA (HCC): ICD-10-CM

## 2021-09-17 DIAGNOSIS — K21.9 GASTROESOPHAGEAL REFLUX DISEASE, UNSPECIFIED WHETHER ESOPHAGITIS PRESENT: ICD-10-CM

## 2021-09-17 LAB
ALBUMIN SERPL-MCNC: 4.9 G/DL (ref 3.4–5)
ALP BLD-CCNC: 82 U/L (ref 40–129)
ALT SERPL-CCNC: 16 U/L (ref 10–40)
ANION GAP SERPL CALCULATED.3IONS-SCNC: 24 MMOL/L (ref 3–16)
AST SERPL-CCNC: 15 U/L (ref 15–37)
BILIRUB SERPL-MCNC: 0.6 MG/DL (ref 0–1)
BILIRUBIN DIRECT: <0.2 MG/DL (ref 0–0.3)
BILIRUBIN, INDIRECT: NORMAL MG/DL (ref 0–1)
BUN BLDV-MCNC: 25 MG/DL (ref 7–20)
CALCIUM SERPL-MCNC: 10.6 MG/DL (ref 8.3–10.6)
CHLORIDE BLD-SCNC: 101 MMOL/L (ref 99–110)
CO2: 16 MMOL/L (ref 21–32)
CREAT SERPL-MCNC: 1.6 MG/DL (ref 0.8–1.3)
GFR AFRICAN AMERICAN: 52
GFR NON-AFRICAN AMERICAN: 43
GLUCOSE BLD-MCNC: 317 MG/DL (ref 70–99)
LDL CHOLESTEROL DIRECT: 90 MG/DL
POTASSIUM SERPL-SCNC: 4.8 MMOL/L (ref 3.5–5.1)
PROSTATE SPECIFIC ANTIGEN: 8.29 NG/ML (ref 0–4)
SODIUM BLD-SCNC: 141 MMOL/L (ref 136–145)
TOTAL PROTEIN: 7.4 G/DL (ref 6.4–8.2)
VITAMIN B-12: 395 PG/ML (ref 211–911)

## 2021-09-17 PROCEDURE — G0439 PPPS, SUBSEQ VISIT: HCPCS | Performed by: FAMILY MEDICINE

## 2021-09-17 PROCEDURE — 99213 OFFICE O/P EST LOW 20 MIN: CPT | Performed by: FAMILY MEDICINE

## 2021-09-17 PROCEDURE — 90732 PPSV23 VACC 2 YRS+ SUBQ/IM: CPT | Performed by: FAMILY MEDICINE

## 2021-09-17 PROCEDURE — 3051F HG A1C>EQUAL 7.0%<8.0%: CPT | Performed by: FAMILY MEDICINE

## 2021-09-17 PROCEDURE — G0009 ADMIN PNEUMOCOCCAL VACCINE: HCPCS | Performed by: FAMILY MEDICINE

## 2021-09-17 PROCEDURE — 36415 COLL VENOUS BLD VENIPUNCTURE: CPT | Performed by: FAMILY MEDICINE

## 2021-09-17 RX ORDER — PREDNISONE 1 MG/1
5 TABLET ORAL DAILY
Qty: 10 TABLET | Refills: 0
Start: 2021-09-17

## 2021-09-17 SDOH — ECONOMIC STABILITY: TRANSPORTATION INSECURITY
IN THE PAST 12 MONTHS, HAS THE LACK OF TRANSPORTATION KEPT YOU FROM MEDICAL APPOINTMENTS OR FROM GETTING MEDICATIONS?: NO

## 2021-09-17 SDOH — ECONOMIC STABILITY: FOOD INSECURITY: WITHIN THE PAST 12 MONTHS, YOU WORRIED THAT YOUR FOOD WOULD RUN OUT BEFORE YOU GOT MONEY TO BUY MORE.: NEVER TRUE

## 2021-09-17 SDOH — ECONOMIC STABILITY: TRANSPORTATION INSECURITY
IN THE PAST 12 MONTHS, HAS LACK OF TRANSPORTATION KEPT YOU FROM MEETINGS, WORK, OR FROM GETTING THINGS NEEDED FOR DAILY LIVING?: NO

## 2021-09-17 SDOH — ECONOMIC STABILITY: FOOD INSECURITY: WITHIN THE PAST 12 MONTHS, THE FOOD YOU BOUGHT JUST DIDN'T LAST AND YOU DIDN'T HAVE MONEY TO GET MORE.: NEVER TRUE

## 2021-09-17 ASSESSMENT — PATIENT HEALTH QUESTIONNAIRE - PHQ9
1. LITTLE INTEREST OR PLEASURE IN DOING THINGS: 0
1. LITTLE INTEREST OR PLEASURE IN DOING THINGS: 0
SUM OF ALL RESPONSES TO PHQ QUESTIONS 1-9: 0
2. FEELING DOWN, DEPRESSED OR HOPELESS: 0
2. FEELING DOWN, DEPRESSED OR HOPELESS: 0
SUM OF ALL RESPONSES TO PHQ QUESTIONS 1-9: 0
SUM OF ALL RESPONSES TO PHQ9 QUESTIONS 1 & 2: 0
SUM OF ALL RESPONSES TO PHQ QUESTIONS 1-9: 0
SUM OF ALL RESPONSES TO PHQ9 QUESTIONS 1 & 2: 0
SUM OF ALL RESPONSES TO PHQ QUESTIONS 1-9: 0

## 2021-09-17 ASSESSMENT — LIFESTYLE VARIABLES
HOW OFTEN DO YOU HAVE A DRINK CONTAINING ALCOHOL: 0
AUDIT TOTAL SCORE: INCOMPLETE
AUDIT-C TOTAL SCORE: INCOMPLETE
HOW OFTEN DO YOU HAVE A DRINK CONTAINING ALCOHOL: 0
HOW OFTEN DO YOU HAVE A DRINK CONTAINING ALCOHOL: NEVER

## 2021-09-17 ASSESSMENT — SOCIAL DETERMINANTS OF HEALTH (SDOH): HOW HARD IS IT FOR YOU TO PAY FOR THE VERY BASICS LIKE FOOD, HOUSING, MEDICAL CARE, AND HEATING?: NOT HARD AT ALL

## 2021-09-17 NOTE — PROGRESS NOTES
Medicare Annual Wellness Visit  Name: Shivam Pierce HWVJZN Date: 2021   MRN: <[id number]> Sex: Male   Age: 77 y.o. Ethnicity: Non- / Non    : 1955 Race: White (non-)      Shivam Charles. is here for Medicare AWV    Screenings for behavioral, psychosocial and functional/safety risks, and cognitive dysfunction are all negative except as indicated below. These results, as well as other patient data from the [address] form, are documented in Flowsheets linked to this Encounter. No Known Allergies      Prior to Visit Medications    Medication Sig Taking?  Authorizing Provider   predniSONE (DELTASONE) 5 MG tablet Take 1 tablet by mouth daily Yes Kinga Curry DO   JANUMET  MG per tablet TAKE 2 TABLETS BY MOUTH DAILY WITH A MEAL Yes Elyse Cortes MD   omeprazole (PRILOSEC) 20 MG delayed release capsule TAKE ONE CAPSULE BY MOUTH DAILY Yes Elyse Cortes MD   ferrous sulfate (FE TABS 325) 325 (65 Fe) MG EC tablet Take 1 tablet by mouth 3 times daily (with meals) Yes Elyse Cortes MD   lovastatin (MEVACOR) 40 MG tablet Take 1 tablet by mouth nightly Yes SERGIO Molina - CNP   aspirin 81 MG EC tablet Take 81 mg by mouth daily Yes Historical Provider, MD   Easy Touch Lancets 33G/Twist MISC USE TO TEST THREE TIMES A DAY Yes Elyse Cortes MD   acetaminophen (TYLENOL) 650 MG extended release tablet Take 650 mg by mouth every 8 hours as needed for Pain Yes Historical Provider, MD   amLODIPine (NORVASC) 10 MG tablet TAKE ONE TABLET BY MOUTH DAILY Yes SERGIO Myers - CNP   losartan (COZAAR) 50 MG tablet Take 1 tablet by mouth daily Yes Elyse Cortes MD   tamsulosin (FLOMAX) 0.4 MG capsule TAKE ONE CAPSULE BY MOUTH DAILY Yes Elyse Cortes MD   blood glucose test strips ([employer] HEALTHPRO GLUCOSE TEST) strip Test twice daily Yes Elyse Cortes MD   Cholecalciferol (VITAMIN D3) 50 MCG (2000 UT) CAPS TAKE ONE CAPSULE BY MOUTH DAILY Yes Virginia Tolbert MD   glipiZIDE (GLUCOTROL) 5 MG tablet Take 2 tablets twice daily  Patient taking differently: Take 5 mg by mouth 2 times daily (before meals) Take 2 tablets twice daily Yes Tod Gordon MD   Blood Glucose Monitoring Suppl (ONE TOUCH ULTRA 2) w/Device KIT 1 kit by Does not apply route daily Yes Tod Gordon MD   Ascorbic Acid (VITAMIN C) 1000 MG tablet Take 1 tablet by mouth daily Yes Tod Gordon MD   Omega-3 Fatty Acids (FISH OIL) 1000 MG CAPS Take 1 capsule by mouth daily Yes Tod Gordon MD   glucose monitoring kit (FREESTYLE) monitoring kit 1 kit by Does not apply route daily Yes Tod Gordon MD   FREESTYLE LANCETS MISC 1 each by Does not apply route 2 times daily Yes Tod Gordon MD   Multiple Vitamins-Minerals (THERAPEUTIC MULTIVITAMIN-MINERALS) tablet Take 1 tablet by mouth daily Yes Kunal Sewell MD   hydrALAZINE (APRESOLINE) 25 MG tablet Take 1 tablet by mouth 3 times daily  Awa Carlson MD   sitaGLIPtan-metformin (JANUMET)  MG per tablet Take 1 tablet by mouth 2 times daily (with meals)  Tod Gordon MD         Past Medical History:   Diagnosis Date    Anemia     Diabetes mellitus ([address]     High blood pressure     Hyperlipidemia     Reflux esophagitis     Tachycardia     Urinary stone 2019    stent placed 2019       Past Surgical History:   Procedure Laterality Date    APPENDECTOMY  1984    COLONOSCOPY  2021    CYSTOSCOPY Left 2019    CYSTOSCOPY LEFT URETERAL STENT INSERTION performed by Jeremy Stroud MD at [facility] [address] History   Problem Relation Age of Onset    Kidney Disease Mother          from   [facility] Diabetes Mother     Cancer Brother         colon       CareTeam (Including outside providers/suppliers regularly involved in providing care):   Patient Care Team:  Tod Gordon MD as PCP - General (Internal Medicine)  Tod Gordon MD as PCP - [facility] Empaneled Provider  Patricia Astudillo MD as Consulting Physician (Urology)    Wt Readings from Last 3 Encounters:   09/17/21 193 lb (87.5 kg)   06/23/21 191 lb 9.6 oz (86.9 kg)   06/22/21 192 lb 12.8 oz (87.5 kg)     Vitals:    09/17/21 1254 09/17/21 1338   BP: 138/84    Site: Right Upper Arm    Position: Sitting    Cuff Size: Large Adult    Pulse: 121 97   Resp: 16    SpO2: 98%    Weight: 193 lb (87.5 kg)    Height: 5' 8\" (1.727 m)      Body mass index is 29.35 kg/m². /84 (Site: Right Upper Arm, Position: Sitting, Cuff Size: Large Adult)   Pulse 97   Resp 16   Ht 5' 8\" (1.727 m)   Wt 193 lb (87.5 kg)   SpO2 98%   BMI 29.35 kg/m²   Based upon direct observation of the patient, evaluation of cognition reveals recent and remote memory intact. Gen- NAD, pleasant  HEENT- Eyes without icterus or injection  Neck- Supple, no lymphadenopathy appreciated  Lungs- CTAB  Heart- RRR  Abd- Soft, non tender  Ext- No edema  Psych- Appropriate    Patient's complete Health Risk Assessment and screening values have been reviewed and are found in Flowsheets. The following problems were reviewed today and where indicated follow up appointments were made and/or referrals ordered. Positive Risk Factor Screenings with Interventions:     Cognitive Impairment Interventions:  · na         General Health and ACP:  General  In general, how would you say your health is?: Fair  In the past 7 days, have you experienced any of the following?  New or Increased Pain, New or Increased Fatigue, Loneliness, Social Isolation, Stress or Anger?: None of These  Do you get the social and emotional support that you need?: Yes  Do you have a Living Will?: (!) No--- discussed and encouraged  Advance Directives     Power of [address] [place] [address] Will ACP-Advance Directive ACP-Power of     Not on File Not on File Not on File Not on File      General Health Risk Interventions:  · per note    Health Habits/Nutrition:  Health Habits/Nutrition  Do you exercise for at least 20 minutes 2-3 times per week?: (!) No--- discussed and encouraged  Have you lost any weight without trying in the past 3 months?: No  Do you eat only one meal per day?: No  Have you seen the dentist within the past year?: (!) No  Body mass index: (!) 29.34  Health Habits/Nutrition Interventions:  · Inadequate physical activity:  exercise encouraged     Safety:  Safety  Do you have working smoke detectors?: Yes  Have all throw rugs been removed or fastened?: (!) No--- discussed and encouraged  Do you have non-slip mats or surfaces in all bathtubs/showers?: (!) No--- discussed and encouraged  Do all of your stairways have a railing or banister?: Yes  Are your doorways, halls and stairs free of clutter?: Yes  Do you always fasten your seatbelt when you are in a car?: Yes  Safety Interventions:  · Home safety tips provided     Personalized Preventive Plan   Current Health Maintenance Status  Immunization History   Administered Date(s) Administered    COVID-19, Pfizer, PF, 30mcg/0.3mL 05/04/2021, 05/24/2021    Influenza Virus Vaccine 12/08/2014, 10/08/2018    Influenza, Triv, inactivated, subunit, adjuvanted, IM (Fluad 65 yrs and older) 09/25/2019    Pneumococcal Conjugate 13-valent (Savannah Moralez) 03/25/2019, 11/07/2019    Tdap (Boostrix, Adacel) 03/02/2016        Health Maintenance   Topic Date Due    Hepatitis C screen  Never done    Diabetic microalbuminuria test  Never done    Shingles Vaccine (1 of 2) Never done    Diabetic foot exam  11/07/2020    Pneumococcal 65+ years Vaccine (2 of 2 - PPSV23) 11/07/2020    Annual Wellness Visit (AWV)  Never done    Flu vaccine (1) 09/01/2021    A1C test (Diabetic or Prediabetic)  03/22/2022    Lipid screen  06/22/2022    Potassium monitoring  06/22/2022    Creatinine monitoring  06/22/2022    Colon Cancer Screen FIT/FOBT  07/07/2022    Diabetic retinal exam  05/27/2023    DTaP/Tdap/Td vaccine (2 - Td or Tdap) 03/02/2026    COVID-19 Vaccine  Completed    Hepatitis A vaccine  Aged Out    Hib vaccine  Aged Out    Meningococcal (ACWY) vaccine  Aged Out     Recommendations for [employer] Due: see orders and patient instructions/AVS.  .   Recommended screening schedule for the next 5-10 years is provided to the patient in written form: see Patient Instructions/AVS.    Diagnosis Orders   Routine general medical examination at a health care facility  Patient agrees to PSA check--- pros and cons of test discussed, he agrees to pneumococcal 23 vaccine, he is to fu in 1 month for shingrix and flu vaccines

## 2021-09-17 NOTE — PATIENT INSTRUCTIONS
Personalized Preventive Plan for Tomasz Gaona. - 9/17/2021  Medicare offers a range of preventive health benefits. Some of the tests and screenings are paid in full while other may be subject to a deductible, co-insurance, and/or copay. Some of these benefits include a comprehensive review of your medical history including lifestyle, illnesses that may run in your family, and various assessments and screenings as appropriate. After reviewing your medical record and screening and assessments performed today your provider may have ordered immunizations, labs, imaging, and/or referrals for you. A list of these orders (if applicable) as well as your Preventive Care list are included within your After Visit Summary for your review. Other Preventive Recommendations:    · A preventive eye exam performed by an eye specialist is recommended every 1-2 years to screen for glaucoma; cataracts, macular degeneration, and other eye disorders. · A preventive dental visit is recommended every 6 months. · Try to get at least 150 minutes of exercise per week or 10,000 steps per day on a pedometer . · Order or download the FREE \"Exercise & Physical Activity: Your Everyday Guide\" from [employer]. Call 9-459.431.7842 or search [employer] online. · You need 6748-2413 mg of calcium and 4263-8878 IU of vitamin D per day. It is possible to meet your calcium requirement with diet alone, but a vitamin D supplement is usually necessary to meet this goal.  · When exposed to the sun, use a sunscreen that protects against both UVA and UVB radiation with an SPF of 30 or greater. Reapply every 2 to 3 hours or after sweating, drying off with a towel, or swimming. · Always wear a seat belt when traveling in a car. Always wear a helmet when riding a bicycle or motorcycle.

## 2021-09-18 LAB
ESTIMATED AVERAGE GLUCOSE: 165.7 MG/DL
HBA1C MFR BLD: 7.4 %

## 2021-09-21 ENCOUNTER — TELEPHONE (OUTPATIENT)
Dept: FAMILY MEDICINE CLINIC | Age: 66
End: 2021-09-21

## 2021-09-21 DIAGNOSIS — R97.20 PSA ELEVATION: Primary | ICD-10-CM

## 2021-09-21 DIAGNOSIS — D50.9 IRON DEFICIENCY ANEMIA, UNSPECIFIED IRON DEFICIENCY ANEMIA TYPE: ICD-10-CM

## 2021-09-21 DIAGNOSIS — D64.9 ANEMIA, UNSPECIFIED TYPE: Primary | ICD-10-CM

## 2021-09-21 RX ORDER — CHOLECALCIFEROL (VITAMIN D3) 125 MCG
1000 CAPSULE ORAL DAILY
Qty: 30 TABLET | Refills: 3 | COMMUNITY
Start: 2021-09-21 | End: 2022-02-15

## 2021-09-21 NOTE — TELEPHONE ENCOUNTER
I spoke with pt about lab results. He agreed to Urology consult for his elevated PSA--- the importance of being evaluated by Urology for this was emphasized. His b12 level is on the lower end of normal. He agrees to start daily b12--- will start him on 1000 mcg daily otc. His GFR is fairly stable. He will continue following with nephrology. He will work on his diet in regards to his A1C that is a bit uncontrolled. CBC was not processed with lab work. He agrees to lab appt this week to have this done.

## 2021-09-22 ENCOUNTER — NURSE ONLY (OUTPATIENT)
Dept: FAMILY MEDICINE CLINIC | Age: 66
End: 2021-09-22
Payer: MEDICARE

## 2021-09-22 DIAGNOSIS — D50.9 IRON DEFICIENCY ANEMIA, UNSPECIFIED IRON DEFICIENCY ANEMIA TYPE: ICD-10-CM

## 2021-09-22 LAB
BASOPHILS ABSOLUTE: 0 K/UL (ref 0–0.2)
BASOPHILS RELATIVE PERCENT: 0.5 %
EOSINOPHILS ABSOLUTE: 0.2 K/UL (ref 0–0.6)
EOSINOPHILS RELATIVE PERCENT: 2.1 %
HCT VFR BLD CALC: 37.1 % (ref 40.5–52.5)
HEMOGLOBIN: 12.7 G/DL (ref 13.5–17.5)
LYMPHOCYTES ABSOLUTE: 1.8 K/UL (ref 1–5.1)
LYMPHOCYTES RELATIVE PERCENT: 20.5 %
MCH RBC QN AUTO: 29.6 PG (ref 26–34)
MCHC RBC AUTO-ENTMCNC: 34.3 G/DL (ref 31–36)
MCV RBC AUTO: 86.3 FL (ref 80–100)
MONOCYTES ABSOLUTE: 0.6 K/UL (ref 0–1.3)
MONOCYTES RELATIVE PERCENT: 6.9 %
NEUTROPHILS ABSOLUTE: 6.2 K/UL (ref 1.7–7.7)
NEUTROPHILS RELATIVE PERCENT: 70 %
PDW BLD-RTO: 15.1 % (ref 12.4–15.4)
PLATELET # BLD: 194 K/UL (ref 135–450)
PMV BLD AUTO: 7.3 FL (ref 5–10.5)
RBC # BLD: 4.29 M/UL (ref 4.2–5.9)
WBC # BLD: 8.9 K/UL (ref 4–11)

## 2021-09-22 PROCEDURE — 36415 COLL VENOUS BLD VENIPUNCTURE: CPT | Performed by: FAMILY MEDICINE

## 2021-09-23 ENCOUNTER — TELEPHONE (OUTPATIENT)
Dept: FAMILY MEDICINE CLINIC | Age: 66
End: 2021-09-23

## 2021-09-23 DIAGNOSIS — D50.9 IRON DEFICIENCY ANEMIA, UNSPECIFIED IRON DEFICIENCY ANEMIA TYPE: Primary | ICD-10-CM

## 2021-09-23 DIAGNOSIS — E78.2 MIXED HYPERLIPIDEMIA: ICD-10-CM

## 2021-09-23 RX ORDER — LANOLIN ALCOHOL/MO/W.PET/CERES
CREAM (GRAM) TOPICAL
Qty: 90 TABLET | Refills: 2
Start: 2021-09-23 | End: 2022-02-15 | Stop reason: SDUPTHER

## 2021-09-23 NOTE — TELEPHONE ENCOUNTER
Please let pt know he remains a bit anemic. I would like him to change his iron to every other day dosing. He will take 1 tab with meals on the days he takes med. Please let him know every other iron is absorbed better than daily iron. I would like to re-check his blood count and iron storage levels in about 2 months--- I will have lab orders in.

## 2021-09-24 RX ORDER — LOVASTATIN 40 MG/1
TABLET ORAL
Qty: 90 TABLET | Refills: 2 | Status: SHIPPED | OUTPATIENT
Start: 2021-09-24 | End: 2022-07-12 | Stop reason: SDUPTHER

## 2021-09-24 RX ORDER — ACETAMINOPHEN 160 MG
TABLET,DISINTEGRATING ORAL
Qty: 90 CAPSULE | Refills: 2 | Status: SHIPPED | OUTPATIENT
Start: 2021-09-24 | End: 2022-07-12 | Stop reason: SDUPTHER

## 2021-11-19 ENCOUNTER — TELEPHONE (OUTPATIENT)
Dept: FAMILY MEDICINE CLINIC | Age: 66
End: 2021-11-19

## 2021-11-19 NOTE — TELEPHONE ENCOUNTER
Dropped off [facility] patient assistance program enrollment form that needs to be filled out by MD. Bob Valencia on MAs desk in basket (Michelle's desk)

## 2021-11-29 NOTE — TELEPHONE ENCOUNTER
I do not see where I was routed original phone call. Assistance form did not have requested med on it. Nurse called today and it is for Otto. Form filled out. We do have Januvia 100 mg tab samples. He can take one of these per day until he gets supply. Please copy form to be scanned.

## 2021-11-29 NOTE — TELEPHONE ENCOUNTER
Pt called in stating he needs these forms filled out and sent ASAP. Pt is out of his meds and his sugars are well over 300. Pt ask if someone could call him back with samples until this is taken care of.  Please advise [phone]

## 2021-11-30 ENCOUNTER — NURSE ONLY (OUTPATIENT)
Dept: FAMILY MEDICINE CLINIC | Age: 66
End: 2021-11-30
Payer: MEDICARE

## 2021-11-30 DIAGNOSIS — D50.9 IRON DEFICIENCY ANEMIA, UNSPECIFIED IRON DEFICIENCY ANEMIA TYPE: ICD-10-CM

## 2021-11-30 PROCEDURE — 36415 COLL VENOUS BLD VENIPUNCTURE: CPT | Performed by: FAMILY MEDICINE

## 2021-12-01 LAB — FERRITIN: 27.6 NG/ML (ref 30–400)

## 2021-12-01 NOTE — TELEPHONE ENCOUNTER
Forms we infact filled out & mailed out on Monday (11/29/21), Pt was called about sample meds to take until he received his meds. Sample meds were taped together with Pt name on them, and left on Michelle's Desk, as I was float for the day. ...

## 2021-12-15 ENCOUNTER — OFFICE VISIT (OUTPATIENT)
Dept: FAMILY MEDICINE CLINIC | Age: 66
End: 2021-12-15
Payer: MEDICARE

## 2021-12-15 VITALS
DIASTOLIC BLOOD PRESSURE: 78 MMHG | RESPIRATION RATE: 17 BRPM | SYSTOLIC BLOOD PRESSURE: 132 MMHG | OXYGEN SATURATION: 98 % | BODY MASS INDEX: 29.98 KG/M2 | HEART RATE: 107 BPM | WEIGHT: 191 LBS | HEIGHT: 67 IN | TEMPERATURE: 98 F

## 2021-12-15 DIAGNOSIS — E11.65 UNCONTROLLED TYPE 2 DIABETES MELLITUS WITH HYPERGLYCEMIA (HCC): Primary | ICD-10-CM

## 2021-12-15 DIAGNOSIS — E78.2 MIXED HYPERLIPIDEMIA: ICD-10-CM

## 2021-12-15 DIAGNOSIS — I10 ESSENTIAL HYPERTENSION: ICD-10-CM

## 2021-12-15 DIAGNOSIS — Z23 NEED FOR INFLUENZA VACCINATION: ICD-10-CM

## 2021-12-15 LAB — HBA1C MFR BLD: 8.8 %

## 2021-12-15 PROCEDURE — 3017F COLORECTAL CA SCREEN DOC REV: CPT | Performed by: NURSE PRACTITIONER

## 2021-12-15 PROCEDURE — 83036 HEMOGLOBIN GLYCOSYLATED A1C: CPT | Performed by: NURSE PRACTITIONER

## 2021-12-15 PROCEDURE — 1036F TOBACCO NON-USER: CPT | Performed by: NURSE PRACTITIONER

## 2021-12-15 PROCEDURE — 2022F DILAT RTA XM EVC RTNOPTHY: CPT | Performed by: NURSE PRACTITIONER

## 2021-12-15 PROCEDURE — 1123F ACP DISCUSS/DSCN MKR DOCD: CPT | Performed by: NURSE PRACTITIONER

## 2021-12-15 PROCEDURE — 4040F PNEUMOC VAC/ADMIN/RCVD: CPT | Performed by: NURSE PRACTITIONER

## 2021-12-15 PROCEDURE — 99214 OFFICE O/P EST MOD 30 MIN: CPT | Performed by: NURSE PRACTITIONER

## 2021-12-15 PROCEDURE — G8427 DOCREV CUR MEDS BY ELIG CLIN: HCPCS | Performed by: NURSE PRACTITIONER

## 2021-12-15 PROCEDURE — G8484 FLU IMMUNIZE NO ADMIN: HCPCS | Performed by: NURSE PRACTITIONER

## 2021-12-15 PROCEDURE — 3052F HG A1C>EQUAL 8.0%<EQUAL 9.0%: CPT | Performed by: NURSE PRACTITIONER

## 2021-12-15 PROCEDURE — G8417 CALC BMI ABV UP PARAM F/U: HCPCS | Performed by: NURSE PRACTITIONER

## 2021-12-15 RX ORDER — PIOGLITAZONEHYDROCHLORIDE 30 MG/1
30 TABLET ORAL DAILY
Qty: 30 TABLET | Refills: 3 | Status: SHIPPED | OUTPATIENT
Start: 2021-12-15 | End: 2022-04-25

## 2021-12-15 NOTE — PROGRESS NOTES
PROGRESS NOTE     Sofia Diamond [address] [facility]  Eyal Mc [address] [address]  [phone] office  [phone] fax    Date of Service:  12/15/2021       Assessment / Plan:     1. Uncontrolled type 2 diabetes mellitus with hyperglycemia (HCC)  Worsened. A1C today 8.8. Patient has not been taking his metormin. He is no longer getting his janumet for free. He has been taking Januvia samples but ran out of those also. He has been taking his glipizide. Patient could certainly benefit from Trulicity however cost is an issue for him. After discussing options I believe adding Actos would be the most cost efficient option. He is to continue his metformin also. Follow up in 3 months.     - POCT glycosylated hemoglobin (Hb A1C)  - metFORMIN (GLUCOPHAGE) 1000 MG tablet; Take 1 tablet by mouth 2 times daily (with meals)  Dispense: 180 tablet; Refill: 1  - pioglitazone (ACTOS) 30 MG tablet; Take 1 tablet by mouth daily  Dispense: 30 tablet; Refill: 3    2. Essential hypertension  Blood pressure at goal. Continue losartan. 3. Mixed hyperlipidemia  Continue on lovastatin. 4. Need for influenza vaccination    - INFLUENZA, HIGH-DOSE, QUADV, 65 YRS +, IM, PF, 0.7ML (FLUZONE)    Encouraged to get Covid booster. Subjective:      Patient ID: Funmi Daniel is a 74 y.o. male      CC: DM, HTN, HLD    HPI  Treatment Adherence:   Medication compliance:  compliant most of the time  Diet compliance:  compliant most of the time  Weight trend: stable  Current exercise: walks 7 time(s) per week  Barriers: none    Diabetes Mellitus Type 2: Current symptoms/problems include none. Home blood sugar records: fasting range: 250s  Any episodes of hypoglycemia? no  Eye exam current (within one year): yes  Tobacco history: He  reports that he has never smoked. He has never used smokeless tobacco.   Daily Aspirin? Yes    He is taking his glipizide.  He was taking Janumet  but he is no longer able to get it free through [facility]. He hasn't had any in about 3 weeks. He did get Januvia 100 mg tabs from our office as samples but ran out about 2 days ago. Hypertension:  Home blood pressure monitoring: Yes - 140/80. He is adherent to a low sodium diet. Patient denies chest pain, shortness of breath, headache, lightheadedness and blurred vision. Antihypertensive medication side effects: no medication side effects noted. Use of agents associated with hypertension: none. Hyperlipidemia:  No new myalgias or GI upset on lovastatin (Mevacor).        The 10-year ASCVD risk score (Hung Mccain., et al., 2013) is: 38%    Values used to calculate the score:      Age: 74 years      Sex: Male      Is Non- : No      Diabetic: Yes      Tobacco smoker: No      Systolic Blood Pressure: 617 mmHg      Is BP treated: Yes      HDL Cholesterol: 40 mg/dL      Total Cholesterol: 211 mg/dL      Lab Results   Component Value Date    LABA1C 8.8 12/15/2021    LABA1C 7.4 09/17/2021    LABA1C 7.1 03/22/2021     Lab Results   Component Value Date    LABMICR YES 06/18/2019    CREATININE 1.6 (H) 09/17/2021     Lab Results   Component Value Date    ALT 16 09/17/2021    AST 15 09/17/2021     Lab Results   Component Value Date    CHOL 211 (H) 06/22/2021    TRIG 428 (H) 06/22/2021    HDL 40 06/22/2021    LDLCALC see below 06/22/2021    LDLDIRECT 90 09/17/2021              Vitals:    12/15/21 1107   BP: 132/78   Pulse: 107   Resp: 17   Temp: 98 °F (36.7 °C)   SpO2: 98%   Weight: 191 lb (86.6 kg)   Height: 5' 7\" (1.702 m)       Outpatient Medications Marked as Taking for the 12/15/21 encounter (Office Visit) with SERGIO Orlando CNP   Medication Sig Dispense Refill    metFORMIN (GLUCOPHAGE) 1000 MG tablet Take 1 tablet by mouth 2 times daily (with meals) 180 tablet 1    pioglitazone (ACTOS) 30 MG tablet Take 1 tablet by mouth daily 30 tablet 3    glipiZIDE (GLUCOTROL) 5 MG tablet Take 2 tablets by mouth 2 times daily (before meals) Take 2 tablets twice daily 360 tablet 0    Cholecalciferol (VITAMIN D3) 50 MCG (2000 UT) CAPS TAKE ONE CAPSULE BY MOUTH DAILY 90 capsule 2    lovastatin (MEVACOR) 40 MG tablet TAKE ONE TABLET BY MOUTH ONCE NIGHTLY 90 tablet 2    ferrous sulfate (FE TABS 325) 325 (65 Fe) MG EC tablet Take 1 tab 3 x daily with meals every 48 hours 90 tablet 2    vitamin B-12 (CYANOCOBALAMIN) 500 MCG tablet Take 2 tablets by mouth daily 30 tablet 3    predniSONE (DELTASONE) 5 MG tablet Take 1 tablet by mouth daily 10 tablet 0    JANUMET  MG per tablet TAKE 2 TABLETS BY MOUTH DAILY WITH A MEAL 180 tablet 1    omeprazole (PRILOSEC) 20 MG delayed release capsule TAKE ONE CAPSULE BY MOUTH DAILY 90 capsule 2    aspirin 81 MG EC tablet Take 81 mg by mouth daily      Easy Touch Lancets 33G/Twist MISC USE TO TEST THREE TIMES A  each 5    acetaminophen (TYLENOL) 650 MG extended release tablet Take 650 mg by mouth every 8 hours as needed for Pain      amLODIPine (NORVASC) 10 MG tablet TAKE ONE TABLET BY MOUTH DAILY 90 tablet 2    losartan (COZAAR) 50 MG tablet Take 1 tablet by mouth daily 90 tablet 3    [DISCONTINUED] tamsulosin (FLOMAX) 0.4 MG capsule TAKE ONE CAPSULE BY MOUTH DAILY 90 capsule 2    blood glucose test strips ([employer] HE[employer] GLUCOSE TEST) strip Test twice daily 100 strip 5    Blood Glucose Monitoring Suppl (ONE TOUCH ULTRA 2) w/Device KIT 1 kit by Does not apply route daily 1 kit 0    Ascorbic Acid (VITAMIN C) 1000 MG tablet Take 1 tablet by mouth daily 90 tablet 3    Omega-3 Fatty Acids (FISH OIL) 1000 MG CAPS Take 1 capsule by mouth daily 90 capsule 3    glucose monitoring kit (FREESTYLE) monitoring kit 1 kit by Does not apply route daily 1 kit 0    FREESTYLE LANCETS MISC 1 each by Does not apply route 2 times daily 100 each 5    Multiple Vitamins-Minerals (THERAPEUTIC MULTIVITAMIN-MINERALS) tablet Take 1 tablet by mouth daily         Past Medical History:   Diagnosis Date    Anemia     Diabetes mellitus ([facility] [address]     High blood pressure     Hyperlipidemia     Reflux esophagitis     Tachycardia     Urinary stone 2019    stent placed 2019       Past Surgical History:   Procedure Laterality Date    APPENDECTOMY  1984    COLONOSCOPY  2021    CYSTOSCOPY Left 2019    CYSTOSCOPY LEFT URETERAL STENT INSERTION performed by Astrid Cali MD at [address] History     Tobacco Use    Smoking status: Never Smoker    Smokeless tobacco: Never Used   Substance Use Topics    Alcohol use: No       Family History   Problem Relation Age of Onset    Kidney Disease Mother          from   [facility] Diabetes Mother     Cancer Brother         colon           Review of Systems  Constitutional:  Negative for activity or appetite change, fever or fatigue  HENT:  Negative for congestion,sinus pressure, or rhinorrhea  Eyes:  Negative for eye pain or visual changes  Resp:  Negative for SOB, chest tightness, cough  Cardiovascular: Negative for CP, palpitations, LANIER, orthopnea, PND, LE edema  Gastrointestinal: Negative for abd pain, melena, BRBPR, N/V/D  Endocrine:  Negative for polydipsia and polyuria  :  Negative for dysuria, flank pain or urinary frequency  Musculoskeletal:  Negative for back pain or myalgias  Neuro:  Negative for dizziness or lightheadedness  Psych: negative for depression or anxiety      Objective:   Constitutional:   · Reviewed vitals above  · Well Nourished, well developed, no distress       HENT:  · Normal external nose without lesions  · Normal nasal mucosa without swelling or erythema  Neck:  · Symmetric and without masses  Resp:  · Normal effort  · Clear to auscultation bilaterally without rhonchi, wheezing or crackles  Cardiovascular:  · On auscultation, normal S1 and S2 without murmurs, rubs or gallops  Gastrointestinal:  · Nontender, nondistended, and no masses  Musculoskeletal:  · Normal Gait  · All extremities without clubbing, cyanosis or edema  Skin:  · No rashes on inspection  · No areas of increased heat or induration on palpation  Psych:  · Normal mood and affect  · Normal insight and judgement

## 2021-12-20 RX ORDER — TAMSULOSIN HYDROCHLORIDE 0.4 MG/1
CAPSULE ORAL
Qty: 90 CAPSULE | Refills: 0 | Status: SHIPPED | OUTPATIENT
Start: 2021-12-20 | End: 2022-03-14

## 2021-12-27 ENCOUNTER — TELEPHONE (OUTPATIENT)
Dept: FAMILY MEDICINE CLINIC | Age: 66
End: 2021-12-27

## 2022-01-06 ENCOUNTER — NURSE ONLY (OUTPATIENT)
Dept: FAMILY MEDICINE CLINIC | Age: 67
End: 2022-01-06
Payer: MEDICARE

## 2022-01-06 PROCEDURE — G0008 ADMIN INFLUENZA VIRUS VAC: HCPCS | Performed by: FAMILY MEDICINE

## 2022-01-06 PROCEDURE — 90694 VACC AIIV4 NO PRSRV 0.5ML IM: CPT | Performed by: FAMILY MEDICINE

## 2022-01-10 ENCOUNTER — TELEPHONE (OUTPATIENT)
Dept: FAMILY MEDICINE CLINIC | Age: 67
End: 2022-01-10

## 2022-02-08 DIAGNOSIS — M75.20 BICEPS TENDONITIS: ICD-10-CM

## 2022-02-08 DIAGNOSIS — M25.512 SHOULDER PAIN, LEFT: ICD-10-CM

## 2022-02-08 RX ORDER — AMLODIPINE BESYLATE 10 MG/1
TABLET ORAL
Qty: 90 TABLET | Refills: 2 | Status: SHIPPED | OUTPATIENT
Start: 2022-02-08

## 2022-02-15 ENCOUNTER — OFFICE VISIT (OUTPATIENT)
Dept: FAMILY MEDICINE CLINIC | Age: 67
End: 2022-02-15
Payer: MEDICARE

## 2022-02-15 VITALS
HEART RATE: 100 BPM | OXYGEN SATURATION: 98 % | BODY MASS INDEX: 30.61 KG/M2 | DIASTOLIC BLOOD PRESSURE: 70 MMHG | WEIGHT: 195 LBS | SYSTOLIC BLOOD PRESSURE: 138 MMHG | HEIGHT: 67 IN

## 2022-02-15 DIAGNOSIS — R10.32 LLQ PAIN: ICD-10-CM

## 2022-02-15 DIAGNOSIS — E11.65 UNCONTROLLED TYPE 2 DIABETES MELLITUS WITH HYPERGLYCEMIA (HCC): Primary | ICD-10-CM

## 2022-02-15 DIAGNOSIS — D50.8 OTHER IRON DEFICIENCY ANEMIA: ICD-10-CM

## 2022-02-15 LAB
BASOPHILS ABSOLUTE: 0 K/UL (ref 0–0.2)
BASOPHILS RELATIVE PERCENT: 0.4 %
EOSINOPHILS ABSOLUTE: 0.1 K/UL (ref 0–0.6)
EOSINOPHILS RELATIVE PERCENT: 1.8 %
HCT VFR BLD CALC: 37.2 % (ref 40.5–52.5)
HEMOGLOBIN: 12.6 G/DL (ref 13.5–17.5)
LYMPHOCYTES ABSOLUTE: 1.2 K/UL (ref 1–5.1)
LYMPHOCYTES RELATIVE PERCENT: 16.6 %
MCH RBC QN AUTO: 29.4 PG (ref 26–34)
MCHC RBC AUTO-ENTMCNC: 33.8 G/DL (ref 31–36)
MCV RBC AUTO: 87.1 FL (ref 80–100)
MONOCYTES ABSOLUTE: 0.6 K/UL (ref 0–1.3)
MONOCYTES RELATIVE PERCENT: 7.4 %
NEUTROPHILS ABSOLUTE: 5.5 K/UL (ref 1.7–7.7)
NEUTROPHILS RELATIVE PERCENT: 73.8 %
PDW BLD-RTO: 14.8 % (ref 12.4–15.4)
PLATELET # BLD: 193 K/UL (ref 135–450)
PMV BLD AUTO: 7.5 FL (ref 5–10.5)
RBC # BLD: 4.27 M/UL (ref 4.2–5.9)
WBC # BLD: 7.4 K/UL (ref 4–11)

## 2022-02-15 PROCEDURE — 4040F PNEUMOC VAC/ADMIN/RCVD: CPT | Performed by: FAMILY MEDICINE

## 2022-02-15 PROCEDURE — 1036F TOBACCO NON-USER: CPT | Performed by: FAMILY MEDICINE

## 2022-02-15 PROCEDURE — 99214 OFFICE O/P EST MOD 30 MIN: CPT | Performed by: FAMILY MEDICINE

## 2022-02-15 PROCEDURE — 2022F DILAT RTA XM EVC RTNOPTHY: CPT | Performed by: FAMILY MEDICINE

## 2022-02-15 PROCEDURE — G8428 CUR MEDS NOT DOCUMENT: HCPCS | Performed by: FAMILY MEDICINE

## 2022-02-15 PROCEDURE — 3046F HEMOGLOBIN A1C LEVEL >9.0%: CPT | Performed by: FAMILY MEDICINE

## 2022-02-15 PROCEDURE — G8417 CALC BMI ABV UP PARAM F/U: HCPCS | Performed by: FAMILY MEDICINE

## 2022-02-15 PROCEDURE — 36415 COLL VENOUS BLD VENIPUNCTURE: CPT | Performed by: FAMILY MEDICINE

## 2022-02-15 PROCEDURE — 1123F ACP DISCUSS/DSCN MKR DOCD: CPT | Performed by: FAMILY MEDICINE

## 2022-02-15 PROCEDURE — G8484 FLU IMMUNIZE NO ADMIN: HCPCS | Performed by: FAMILY MEDICINE

## 2022-02-15 PROCEDURE — 3017F COLORECTAL CA SCREEN DOC REV: CPT | Performed by: FAMILY MEDICINE

## 2022-02-15 RX ORDER — GLIPIZIDE 5 MG/1
10 TABLET ORAL
Qty: 360 TABLET | Refills: 3 | Status: SHIPPED | OUTPATIENT
Start: 2022-02-15 | End: 2022-03-08

## 2022-02-15 RX ORDER — LANOLIN ALCOHOL/MO/W.PET/CERES
325 CREAM (GRAM) TOPICAL
Qty: 270 TABLET | Refills: 0 | Status: SHIPPED | OUTPATIENT
Start: 2022-02-15 | End: 2022-06-08 | Stop reason: SDUPTHER

## 2022-02-15 NOTE — PROGRESS NOTES
A/P:    Diagnosis Orders   1. Uncontrolled type 2 diabetes mellitus with hyperglycemia (HCC)  metFORMIN (GLUCOPHAGE) 1000 MG tablet    FRUCTOSAMINE    C-PEPTIDE   2. Other iron deficiency anemia  CBC Auto Differential    FERRITIN   3. LLQ pain       For his type 2 diabetes that is uncontrolled, will check fructosamine and C-peptide levels. For his iron deficiency anemia, will recheck CBC and check a ferritin level. His hypertension is reasonably controlled. For his left lower quadrant pain, we discussed possible CT scan. He will consider this further. He is to be evaluated with worsening symptoms. Follow-up in 3 months or sooner if needed    O: /70   Pulse 100   Ht 5' 7\" (1.702 m)   Wt 195 lb (88.5 kg)   SpO2 98%   BMI 30.54 kg/m²    Gen- NAD, pleasant  HEENT- Eyes without icterus or injection  Neck- Supple, no lymphadenopathy appreciated  Lungs- CTAB  Heart-slightly tachycardic, regular rhythm  Abd- Soft, non tender  Ext- No edema  Psych- Appropriate    S: CC-establish care, chronic disease management  HPI-Mr. Chris Loza presents to establish care and for chronic disease management. He reports that since his Januvia was stopped and Actos started mid December his fasting sugars have increased. He is at 200 pretty consistently. He denies chest pain, dyspnea, palpitations. He continues prednisone for his temporal arteritis. He is down to 5mg/day. He did not get message about possible referral to hematology for iron infusion consideration. He would like to see what his iron level is today first.  He reports he is now taking it every day rather than every other day. He notes some dizziness with standing for years. He reports his left lower quadrant has been hurting him for about 1 year. The pain is worse with eating. He is up-to-date with colonoscopy. He had previous intervention urologically for stones on the left. He denies fever, urinary symptoms, any known bowel bleeding.     ROS- Per HPI    Patient's history was reviewed and updated

## 2022-02-16 LAB — FERRITIN: 14.2 NG/ML (ref 30–400)

## 2022-02-17 LAB — C-PEPTIDE: 10 NG/ML (ref 1.1–4.4)

## 2022-02-18 LAB — FRUCTOSAMINE: 405 UMOL/L (ref 170–285)

## 2022-02-28 ENCOUNTER — TELEPHONE (OUTPATIENT)
Dept: FAMILY MEDICINE CLINIC | Age: 67
End: 2022-02-28

## 2022-02-28 DIAGNOSIS — D50.8 OTHER IRON DEFICIENCY ANEMIA: Primary | ICD-10-CM

## 2022-03-04 RX ORDER — EMPAGLIFLOZIN 10 MG/1
1 TABLET, FILM COATED ORAL DAILY
Qty: 30 TABLET | Refills: 0 | Status: SHIPPED | OUTPATIENT
Start: 2022-03-04 | End: 2022-03-08

## 2022-03-04 NOTE — TELEPHONE ENCOUNTER
Gave patient update and phone number to call [facility]. Attempted to call insurance and was transferred multiple times all of which told me I was in the wrong department. I am not sure what to do.
Left a message to return call at [phone] per HIPAA.
Michelle, please give him update on blood specialist referral.  What he is on currently for his sugar is not enough.
Patient states he is taking iron tablets but I explained that this is not enough. He is okay with iron infusion.
Please let patient know his sugar average is about a 10--- which is quite uncontrolled. He is making a lot of insulin on his own which makes him a candidate for oral medicine. Does he have a list of his insurance's preferred meds. Also, his iron storage level remains very low. I would recommend that he see the blood specialist so he can be considered for iron infusions.   Is he okay with this referral?
Please let them know we cannot get a hold of his drug company, I have sent a prescription for Jardiance to the pharmacy to see if it goes through, it helps the body urinate the sugar out, can also help the blood pressure
Pt called in and I advised him of the notes and he stated he would go get the RX and start taking it tomorrow morning.
Pt is wondering what is going on with his iron infusions and also states he takes blood sugar tablets already. Please advise.  Reachable [phone]
[Non-Contributory] : Non-contributory

## 2022-03-07 ENCOUNTER — TELEPHONE (OUTPATIENT)
Dept: FAMILY MEDICINE CLINIC | Age: 67
End: 2022-03-07

## 2022-03-07 NOTE — TELEPHONE ENCOUNTER
Pt is calling because the jardiance is going to cost him $500 and that is after insurance. I looked at [facility] and its not any cheaper. Pt is asking if there are any savings card or can the medication be changed to something else.

## 2022-03-08 RX ORDER — GLIPIZIDE 10 MG/1
20 TABLET ORAL 2 TIMES DAILY
Qty: 360 TABLET | Refills: 1 | Status: SHIPPED | OUTPATIENT
Start: 2022-03-08 | End: 2022-10-06

## 2022-03-08 NOTE — TELEPHONE ENCOUNTER
I have increased his glipizide to 20 mg twice per day, new prescription sent to pharmacy    I would like him to keep a log of his fasting blood sugar over the next 10 days and then let me know those readings

## 2022-03-14 RX ORDER — TAMSULOSIN HYDROCHLORIDE 0.4 MG/1
CAPSULE ORAL
Qty: 90 CAPSULE | Refills: 3 | Status: SHIPPED | OUTPATIENT
Start: 2022-03-14

## 2022-03-14 NOTE — TELEPHONE ENCOUNTER
Medication:   Requested Prescriptions     Pending Prescriptions Disp Refills    tamsulosin (FLOMAX) 0.4 MG capsule [Pharmacy Med Name: TAMSULOSIN HCL 0.4 MG CAPSULE] 90 capsule 0     Sig: TAKE ONE CAPSULE BY MOUTH DAILY       Last Filled:      Patient Phone Number: [phone] (home)     Last appt: 2/15/2022   Next appt: 5/16/2022    Last PSA:   Lab Results   Component Value Date    PSA 8.29 09/17/2021

## 2022-03-17 NOTE — TELEPHONE ENCOUNTER
Medication:   Requested Prescriptions     Pending Prescriptions Disp Refills    losartan (COZAAR) 50 MG tablet 90 tablet 3     Sig: Take 1 tablet by mouth daily       Last Filled:      Patient Phone Number: [phone] (home)     Last appt: 2/15/2022   Next appt: 5/16/2022    Last CMP: No results found for: CMP  Last A1C:   Lab Results   Component Value Date    LABA1C 8.8 12/15/2021     Last Lipid:   Lab Results   Component Value Date    CHOL 211 06/22/2021    TRIG 428 06/22/2021    HDL 40 06/22/2021    LDLCALC see below 06/22/2021     Last PSA:   Lab Results   Component Value Date    PSA 8.29 09/17/2021     Last Thyroid: No results found for: TSH, FT3, H7GRZLB, T4FREE, F6MMBTG

## 2022-03-18 RX ORDER — LOSARTAN POTASSIUM 50 MG/1
50 TABLET ORAL DAILY
Qty: 90 TABLET | Refills: 3 | Status: SHIPPED | OUTPATIENT
Start: 2022-03-18

## 2022-03-28 RX ORDER — OMEPRAZOLE 20 MG/1
20 CAPSULE, DELAYED RELEASE ORAL DAILY
Qty: 90 CAPSULE | Refills: 2 | Status: SHIPPED | OUTPATIENT
Start: 2022-03-28

## 2022-03-28 NOTE — TELEPHONE ENCOUNTER
Medication:   Requested Prescriptions     Pending Prescriptions Disp Refills    omeprazole (PRILOSEC) 20 MG delayed release capsule 90 capsule 2     Sig: Take 1 capsule by mouth Daily       Last Filled:      Patient Phone Number: [phone] (home)     Last appt: 2/15/2022   Next appt: 5/16/2022

## 2022-04-19 NOTE — TELEPHONE ENCOUNTER
Requesting refill on test strips. Had called pharm for refill request. I asked which machine he used, he states he uses the [employer]ic[employer]er brand test strips.  Please call into- [facility] -[phone]

## 2022-04-20 RX ORDER — BLOOD-GLUCOSE METER
EACH MISCELLANEOUS
Qty: 100 STRIP | Refills: 5 | Status: SHIPPED | OUTPATIENT
Start: 2022-04-20

## 2022-04-25 DIAGNOSIS — E11.65 UNCONTROLLED TYPE 2 DIABETES MELLITUS WITH HYPERGLYCEMIA (HCC): ICD-10-CM

## 2022-04-25 RX ORDER — PIOGLITAZONEHYDROCHLORIDE 30 MG/1
TABLET ORAL
Qty: 90 TABLET | Refills: 3 | Status: SHIPPED
Start: 2022-04-25 | End: 2022-06-20 | Stop reason: DRUGHIGH

## 2022-05-16 ENCOUNTER — OFFICE VISIT (OUTPATIENT)
Dept: FAMILY MEDICINE CLINIC | Age: 67
End: 2022-05-16
Payer: MEDICARE

## 2022-05-16 VITALS
SYSTOLIC BLOOD PRESSURE: 129 MMHG | HEIGHT: 67 IN | BODY MASS INDEX: 30.61 KG/M2 | OXYGEN SATURATION: 97 % | HEART RATE: 105 BPM | DIASTOLIC BLOOD PRESSURE: 82 MMHG | WEIGHT: 195 LBS

## 2022-05-16 DIAGNOSIS — R41.89 COGNITIVE CHANGE: ICD-10-CM

## 2022-05-16 DIAGNOSIS — E11.00 TYPE 2 DIABETES MELLITUS WITH HYPEROSMOLARITY WITHOUT COMA, WITHOUT LONG-TERM CURRENT USE OF INSULIN (HCC): Primary | ICD-10-CM

## 2022-05-16 DIAGNOSIS — I10 ESSENTIAL HYPERTENSION: ICD-10-CM

## 2022-05-16 PROBLEM — N20.0 KIDNEY STONE: Status: ACTIVE | Noted: 2020-08-28

## 2022-05-16 PROBLEM — D50.0 ANEMIA DUE TO CHRONIC BLOOD LOSS: Status: ACTIVE | Noted: 2022-05-16

## 2022-05-16 PROBLEM — R79.89 SERUM CREATININE RAISED: Status: ACTIVE | Noted: 2018-09-22

## 2022-05-16 PROBLEM — N18.31 CHRONIC KIDNEY DISEASE, STAGE 3A (HCC): Status: ACTIVE | Noted: 2020-08-28

## 2022-05-16 LAB — FOLATE: >20 NG/ML (ref 4.78–24.2)

## 2022-05-16 PROCEDURE — 1036F TOBACCO NON-USER: CPT | Performed by: FAMILY MEDICINE

## 2022-05-16 PROCEDURE — 3017F COLORECTAL CA SCREEN DOC REV: CPT | Performed by: FAMILY MEDICINE

## 2022-05-16 PROCEDURE — G8427 DOCREV CUR MEDS BY ELIG CLIN: HCPCS | Performed by: FAMILY MEDICINE

## 2022-05-16 PROCEDURE — 1123F ACP DISCUSS/DSCN MKR DOCD: CPT | Performed by: FAMILY MEDICINE

## 2022-05-16 PROCEDURE — G8417 CALC BMI ABV UP PARAM F/U: HCPCS | Performed by: FAMILY MEDICINE

## 2022-05-16 PROCEDURE — 99214 OFFICE O/P EST MOD 30 MIN: CPT | Performed by: FAMILY MEDICINE

## 2022-05-16 PROCEDURE — 36415 COLL VENOUS BLD VENIPUNCTURE: CPT | Performed by: FAMILY MEDICINE

## 2022-05-16 PROCEDURE — 2022F DILAT RTA XM EVC RTNOPTHY: CPT | Performed by: FAMILY MEDICINE

## 2022-05-16 PROCEDURE — 3052F HG A1C>EQUAL 8.0%<EQUAL 9.0%: CPT | Performed by: FAMILY MEDICINE

## 2022-05-16 PROCEDURE — 4040F PNEUMOC VAC/ADMIN/RCVD: CPT | Performed by: FAMILY MEDICINE

## 2022-05-16 NOTE — PROGRESS NOTES
A/P:    Diagnosis Orders   1. Type 2 diabetes mellitus with hyperosmolarity without coma, without long-term current use of insulin (HCC)  Hemoglobin A1C    Hepatic Function Panel    Renal Function Panel    Lipid Panel    Microalbumin / Creatinine Urine Ratio   2. Cognitive change  MRI BRAIN W WO CONTRAST    METHYLMALONIC ACID, SERUM    Folate   3. Essential hypertension       With his abrupt cognitive changes, I have ordered MRI, will check above labs    His hypertension is reasonably controlled, he will continue current med management    Diabetes wise, his A1c was a bit uncontrolled on last check, check A1c today, he will continue with current med management    Follow-up in 6 months or sooner if needed    O: /82   Pulse 105   Ht 5' 7\" (1.702 m)   Wt 195 lb (88.5 kg)   SpO2 97%   BMI 30.54 kg/m²    Gen- NAD, pleasant  HEENT- Eyes without icterus or injection  Neck- Supple, no lymphadenopathy appreciated  Lungs- CTAB  Heart- RRR  Abd- Soft, non tender  Ext- No edema  Neuro/psych- Appropriate, he is 3 out of 3 in word recall, his clock drawing is intact, he can spell world backwards without difficulty    S: CC-med check  HPI-patient presents, with wife, today for follow-up of his hypertension, diabetes. Wife and children have noticed a significant decline in his cognitive status over the past 2 months. He has difficulty remembering conversations, retrieving short-term memories, he has gotten lost.  He denies headaches, head injuries, weakness, paresthesias. He reports that he is seeing hematology for his anemia. He reports he is seeing urology for his elevated PSA.     ROS-denies fever, chills, night sweats  Denies chest pain, dyspnea, palpitations    Patient's medications, allergies, and past medical hx were reviewed

## 2022-05-17 LAB
ALBUMIN SERPL-MCNC: 4.6 G/DL (ref 3.4–5)
ALP BLD-CCNC: 69 U/L (ref 40–129)
ALT SERPL-CCNC: 18 U/L (ref 10–40)
ANION GAP SERPL CALCULATED.3IONS-SCNC: 21 MMOL/L (ref 3–16)
AST SERPL-CCNC: 17 U/L (ref 15–37)
BILIRUB SERPL-MCNC: 0.7 MG/DL (ref 0–1)
BILIRUBIN DIRECT: <0.2 MG/DL (ref 0–0.3)
BILIRUBIN, INDIRECT: NORMAL MG/DL (ref 0–1)
BUN BLDV-MCNC: 21 MG/DL (ref 7–20)
CALCIUM SERPL-MCNC: 10.1 MG/DL (ref 8.3–10.6)
CHLORIDE BLD-SCNC: 100 MMOL/L (ref 99–110)
CHOLESTEROL, TOTAL: 214 MG/DL (ref 0–199)
CO2: 20 MMOL/L (ref 21–32)
CREAT SERPL-MCNC: 1.4 MG/DL (ref 0.8–1.3)
CREATININE URINE: 150.5 MG/DL (ref 39–259)
ESTIMATED AVERAGE GLUCOSE: 191.5 MG/DL
GFR AFRICAN AMERICAN: >60
GFR NON-AFRICAN AMERICAN: 50
GLUCOSE BLD-MCNC: 183 MG/DL (ref 70–99)
HBA1C MFR BLD: 8.3 %
HDLC SERPL-MCNC: 46 MG/DL (ref 40–60)
LDL CHOLESTEROL CALCULATED: ABNORMAL MG/DL
LDL CHOLESTEROL DIRECT: 106 MG/DL
MICROALBUMIN UR-MCNC: 142.5 MG/DL
MICROALBUMIN/CREAT UR-RTO: 946.8 MG/G (ref 0–30)
PHOSPHORUS: 3.7 MG/DL (ref 2.5–4.9)
POTASSIUM SERPL-SCNC: 4.3 MMOL/L (ref 3.5–5.1)
SODIUM BLD-SCNC: 141 MMOL/L (ref 136–145)
TOTAL PROTEIN: 6.9 G/DL (ref 6.4–8.2)
TRIGL SERPL-MCNC: 385 MG/DL (ref 0–150)
VLDLC SERPL CALC-MCNC: ABNORMAL MG/DL

## 2022-05-19 LAB — METHYLMALONIC ACID: 0.36 UMOL/L (ref 0–0.4)

## 2022-05-26 ENCOUNTER — TELEPHONE (OUTPATIENT)
Dept: FAMILY MEDICINE CLINIC | Age: 67
End: 2022-05-26

## 2022-05-26 ENCOUNTER — HOSPITAL ENCOUNTER (OUTPATIENT)
Dept: MRI IMAGING | Age: 67
Discharge: HOME | End: 2022-05-26
Payer: MEDICARE

## 2022-05-26 DIAGNOSIS — I67.89 CEREBRAL MICROVASCULAR DISEASE: ICD-10-CM

## 2022-05-26 DIAGNOSIS — D37.030 NEOPLASM OF UNCERTAIN BEHAVIOR OF THE PAROTID SALIVARY GLANDS: ICD-10-CM

## 2022-05-26 DIAGNOSIS — R41.89 COGNITIVE CHANGE: ICD-10-CM

## 2022-05-26 DIAGNOSIS — R41.3 MEMORY CHANGES: Primary | ICD-10-CM

## 2022-05-26 PROCEDURE — 6360000004 HC RX CONTRAST MEDICATION: Performed by: FAMILY MEDICINE

## 2022-05-26 PROCEDURE — A9577 INJ MULTIHANCE: HCPCS | Performed by: FAMILY MEDICINE

## 2022-05-26 PROCEDURE — 70553 MRI BRAIN STEM W/O & W/DYE: CPT

## 2022-05-26 RX ADMIN — GADOBENATE DIMEGLUMINE 18 ML: 529 INJECTION, SOLUTION INTRAVENOUS at 11:44

## 2022-05-27 RX ORDER — PIOGLITAZONEHYDROCHLORIDE 45 MG/1
45 TABLET ORAL DAILY
Qty: 90 TABLET | Refills: 3 | Status: SHIPPED | OUTPATIENT
Start: 2022-05-27

## 2022-05-27 NOTE — TELEPHONE ENCOUNTER
Please let patient know his diabetic control is a bit better, but still not to goal.  I would like to see his A1c, 3-month sugar average, below 7. I would like him to increase his Actos to 45 mg daily. His kidney function is stable, however, he has a large amount of protein in his urine. The 2 most common causes of kidney disease are diabetes and high blood pressure. Better diabetes control will help protect the kidneys. I would like him to be followed by a kidney doctor. Would he be okay with referral?  His brain did show some micro changes that can increase the risk for memory loss. Does he want to see a neurologist for his memory loss? On MRI, the radiologist also saw a possible salivary gland growth. We will need ENT to take a look. It is possible a biopsy is needed.   Is he okay with ENT referral?

## 2022-05-27 NOTE — TELEPHONE ENCOUNTER
Patient advised of test result(s) and verbalized understanding. Patient needs a new script for actos 45 mg. Patient sees Dr Marsha Rivas at [facility]. He was instructed to call to follow up with him.     He is okay with ENT and Neuro referral but would like to stay around [address] [place] [address],[address].

## 2022-06-08 RX ORDER — HYDRALAZINE HYDROCHLORIDE 25 MG/1
TABLET, FILM COATED ORAL
Qty: 270 TABLET | Refills: 3 | Status: SHIPPED | OUTPATIENT
Start: 2022-06-08

## 2022-06-08 RX ORDER — LANOLIN ALCOHOL/MO/W.PET/CERES
325 CREAM (GRAM) TOPICAL 2 TIMES DAILY
Qty: 60 TABLET | Refills: 1 | Status: SHIPPED | OUTPATIENT
Start: 2022-06-08 | End: 2022-08-18

## 2022-06-08 NOTE — TELEPHONE ENCOUNTER
Patient is calling requesting refills for:    Medication:   Requested Prescriptions     Pending Prescriptions Disp Refills    ferrous sulfate (FE TABS 325) 325 (65 Fe) MG EC tablet 270 tablet 0     Sig: Take 1 tablet by mouth 3 times daily (with meals)       Last Filled:      Patient Phone Number: [phone] (home)     Last appt: 5/16/2022   Next appt: Visit date not found    Pharmacy:   [facility] [id number] [address]. Adriana Ambrose [phone] - F [phone]  [address] [place] [address].   [place] [address]  Phone: [phone] Fax: [phone]

## 2022-06-09 ENCOUNTER — OFFICE VISIT (OUTPATIENT)
Dept: ENT CLINIC | Age: 67
End: 2022-06-09
Payer: MEDICARE

## 2022-06-09 VITALS
RESPIRATION RATE: 16 BRPM | WEIGHT: 201 LBS | SYSTOLIC BLOOD PRESSURE: 151 MMHG | DIASTOLIC BLOOD PRESSURE: 95 MMHG | BODY MASS INDEX: 31.55 KG/M2 | HEIGHT: 67 IN | HEART RATE: 103 BPM

## 2022-06-09 DIAGNOSIS — H61.23 BILATERAL IMPACTED CERUMEN: ICD-10-CM

## 2022-06-09 DIAGNOSIS — K11.8 PAROTID MASS: Primary | ICD-10-CM

## 2022-06-09 PROCEDURE — 1123F ACP DISCUSS/DSCN MKR DOCD: CPT | Performed by: OTOLARYNGOLOGY

## 2022-06-09 PROCEDURE — 10021 FNA BX W/O IMG GDN 1ST LES: CPT | Performed by: OTOLARYNGOLOGY

## 2022-06-09 PROCEDURE — G8417 CALC BMI ABV UP PARAM F/U: HCPCS | Performed by: OTOLARYNGOLOGY

## 2022-06-09 PROCEDURE — 3017F COLORECTAL CA SCREEN DOC REV: CPT | Performed by: OTOLARYNGOLOGY

## 2022-06-09 PROCEDURE — 69210 REMOVE IMPACTED EAR WAX UNI: CPT | Performed by: OTOLARYNGOLOGY

## 2022-06-09 PROCEDURE — 99203 OFFICE O/P NEW LOW 30 MIN: CPT | Performed by: OTOLARYNGOLOGY

## 2022-06-09 PROCEDURE — 1036F TOBACCO NON-USER: CPT | Performed by: OTOLARYNGOLOGY

## 2022-06-09 PROCEDURE — G8427 DOCREV CUR MEDS BY ELIG CLIN: HCPCS | Performed by: OTOLARYNGOLOGY

## 2022-06-09 NOTE — PROGRESS NOTES
[facility]      Patient Name: Cecil Louis. Medical Record Number:  [id number]  Primary Care Physician:  Esperanza Byrne DO  Date of Consultation: 6/9/2022    Chief Complaint: Parotid mass        HISTORY OF PRESENT ILLNESS  Luis Rasmussen is a(n) 74 y.o. male who presents for evaluation of a right parotid mass. The patient underwent an MRI of the brain on May 26, 2022. An incidental right parotid mass was identified. The patient was really unaware that he had this mass. He denies any facial weakness. He has never been a smoker. He denies any facial pain. He denies any neck masses    He does complain of clogged ears. Feels as though he does not hear well. Has had issues with wax in the past.            REVIEW OF SYSTEMS  As above    PHYSICAL EXAM  GENERAL: No Acute Distress, Alert and Oriented, no Hoarseness, strong voice  EYES: EOMI, Anti-icteric  HENT:   Head: Normocephalic and atraumatic. Face: Patient has a firm 2 to 3 cm lesion centrally in the right parotid gland. There is no evidence of facial nerve weakness. I do not appreciate a mass in the left side   ears: See below  Mouth/Oral Cavity:  normal lips, Uvula is midline, no mucosal lesions, no trismus  Oropharynx/Larynx:  normal oropharynx  Nose:Normal external nasal appearance. A  NECK: Normal range of motion, no thyromegaly, trachea is midline, no lymphadenopathy, no neck masses, no crepitus          RADIOLOGY  Summary of findings:  I reviewed the patient's MRI from 8/26/2022. It does not include the entirety of the parotid glands as it is an MRI of the brain, but you can see a 2.5 cm right parotid mass that is relatively central within the parotid gland. There is trace left mastoid disease      PROCEDURE  Fine-needle aspiration of right parotid lesion  1 mL 1% lidocaine with epinephrine was injected in the skin over top of the right parotid lesion.   3 passes with 22-gauge needles were made into the mass. The aspirate was then placed in Cytol and sent to pathology. Patient tolerated well    Bilateral ear exam with cerumen removal  The right ear is visualized binoculars scope. A very dense cerumen impaction was removed with a Dukes suction. Tympanic membrane intact with aerated middle ear    The left side again he had a dense cerumen impaction that removed with a Dukes suction. Tympanic remains a bit retracted, but aerated middle ear        ASSESSMENT/PLAN  1. 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 Statistically speaking is benign, however there is also a chance of malignancy. I typically recommend surgical excision of both benign and malignant parotid masses. I told them that I like to get an FNA first because if it is a cancer we would have to be ready for more aggressive surgery perhaps even a neck dissection. They understand the risk of an FNA is mostly that it will be nondiagnostic. They agreed to proceed. See above procedure note. 2. Bilateral impacted cerumen  Removed today in clinic. Long-term will likely need an audiogram           I have performed a head and neck physical exam personally or was physically present during the key or critical portions of the service. This note was generated completely or in part utilizing Dragon dictation speech recognition software. Occasionally, words are mistranscribed and despite editing, the text may contain inaccuracies due to incorrect word recognition. If further clarification is needed please contact the office at [phone].

## 2022-06-15 ENCOUNTER — OFFICE VISIT (OUTPATIENT)
Dept: ENT CLINIC | Age: 67
End: 2022-06-15
Payer: MEDICARE

## 2022-06-15 VITALS
DIASTOLIC BLOOD PRESSURE: 82 MMHG | WEIGHT: 199 LBS | HEART RATE: 118 BPM | BODY MASS INDEX: 31.17 KG/M2 | SYSTOLIC BLOOD PRESSURE: 161 MMHG

## 2022-06-15 DIAGNOSIS — K11.8 PAROTID MASS: Primary | ICD-10-CM

## 2022-06-15 PROCEDURE — 99214 OFFICE O/P EST MOD 30 MIN: CPT | Performed by: OTOLARYNGOLOGY

## 2022-06-15 PROCEDURE — 3017F COLORECTAL CA SCREEN DOC REV: CPT | Performed by: OTOLARYNGOLOGY

## 2022-06-15 PROCEDURE — 1123F ACP DISCUSS/DSCN MKR DOCD: CPT | Performed by: OTOLARYNGOLOGY

## 2022-06-15 PROCEDURE — 1036F TOBACCO NON-USER: CPT | Performed by: OTOLARYNGOLOGY

## 2022-06-15 PROCEDURE — G8417 CALC BMI ABV UP PARAM F/U: HCPCS | Performed by: OTOLARYNGOLOGY

## 2022-06-15 PROCEDURE — G8427 DOCREV CUR MEDS BY ELIG CLIN: HCPCS | Performed by: OTOLARYNGOLOGY

## 2022-06-15 NOTE — PROGRESS NOTES
[facility]- HEAD & NECK SURGERY  Follow up      Patient Name: Cecil Louis. Medical Record Number:  [id number]  Primary Care Physician:  Esperanza Byrne DO  Date of Consultation: 6/15/2022    Chief Complaint: Right parotid lesion        Interval History    Patient is following up for his right parotid lesion. I did an FNA on June 9, 2022. Overall he has no changes. Here to discuss biopsy results and possible surgery        REVIEW OF SYSTEMS  As above    PHYSICAL EXAM  GENERAL: No Acute Distress, Alert and Oriented, no Hoarseness, strong voice  EYES: EOMI, Anti-icteric  HENT:   Head: Normocephalic and atraumatic. Face:  Symmetric, facial nerve intact, no sinus tenderness  Right Ear: Right neck mass  Left Ear: Normal external ear, normal external auditory canal, intact tympanic membrane with normal mobility and aerated middle ear  Mouth/Oral Cavity:  normal lips, Uvula is midline, no mucosal lesions, no trismus  Oropharynx/Larynx:  normal oropharynx  Nose:Normal external nasal appearance. NECK: Normal range of motion, no thyromegaly, trachea is midline, no lymphadenopathy, no neck masses, no crepitus      FNA showed atypical cells of indeterminate significance    ASSESSMENT/PLAN  1. Parotid mass  The FNA showed atypical cells. Pathology did note that did not feel as though there was evidence of an aggressive malignancy. I discussed this result with the patient. I do think that it is likely benign, but there is still a small chance there is a malignancy based on FNA. Even if it is a benign tumor we typically do recommend a parotidectomy to remove it. We discussed the risk of this includes damage to facial nerve, recurrence, infection and cosmetic changes to his face. The patient agrees to proceed with the surgery. We will have him get cleared for surgery by his primary care doctor.   Tentative plan to do this in a couple of weeks             I have performed a head and neck physical exam personally or was physically present during the key or critical portions of the service. This note was generated completely or in part utilizing Dragon dictation speech recognition software. Occasionally, words are mistranscribed and despite editing, the text may contain inaccuracies due to incorrect word recognition. If further clarification is needed please contact the office at [phone].

## 2022-06-20 ENCOUNTER — TELEPHONE (OUTPATIENT)
Dept: FAMILY MEDICINE CLINIC | Age: 67
End: 2022-06-20

## 2022-06-20 ENCOUNTER — OFFICE VISIT (OUTPATIENT)
Dept: FAMILY MEDICINE CLINIC | Age: 67
End: 2022-06-20
Payer: MEDICARE

## 2022-06-20 VITALS
WEIGHT: 198.6 LBS | DIASTOLIC BLOOD PRESSURE: 70 MMHG | OXYGEN SATURATION: 99 % | SYSTOLIC BLOOD PRESSURE: 140 MMHG | BODY MASS INDEX: 31.17 KG/M2 | HEART RATE: 96 BPM | HEIGHT: 67 IN

## 2022-06-20 DIAGNOSIS — I10 UNCONTROLLED HYPERTENSION: ICD-10-CM

## 2022-06-20 DIAGNOSIS — K11.8 MASS OF RIGHT PAROTID GLAND: ICD-10-CM

## 2022-06-20 DIAGNOSIS — R94.31 ABNORMAL EKG: ICD-10-CM

## 2022-06-20 DIAGNOSIS — E08.00 DIABETES MELLITUS DUE TO UNDERLYING CONDITION WITH HYPEROSMOLARITY WITHOUT COMA, WITHOUT LONG-TERM CURRENT USE OF INSULIN (HCC): ICD-10-CM

## 2022-06-20 DIAGNOSIS — Z01.818 PRE-OPERATIVE CLEARANCE: Primary | ICD-10-CM

## 2022-06-20 LAB
A/G RATIO: 2.2 (ref 1.1–2.2)
ALBUMIN SERPL-MCNC: 4.9 G/DL (ref 3.4–5)
ALP BLD-CCNC: 79 U/L (ref 40–129)
ALT SERPL-CCNC: 23 U/L (ref 10–40)
ANION GAP SERPL CALCULATED.3IONS-SCNC: 14 MMOL/L (ref 3–16)
APTT: 34.1 SEC (ref 23–34.3)
AST SERPL-CCNC: 18 U/L (ref 15–37)
BASOPHILS ABSOLUTE: 0 K/UL (ref 0–0.2)
BASOPHILS RELATIVE PERCENT: 0.4 %
BILIRUB SERPL-MCNC: 0.6 MG/DL (ref 0–1)
BUN BLDV-MCNC: 27 MG/DL (ref 7–20)
CALCIUM SERPL-MCNC: 10.5 MG/DL (ref 8.3–10.6)
CHLORIDE BLD-SCNC: 104 MMOL/L (ref 99–110)
CO2: 24 MMOL/L (ref 21–32)
CREAT SERPL-MCNC: 1.4 MG/DL (ref 0.8–1.3)
EOSINOPHILS ABSOLUTE: 0.1 K/UL (ref 0–0.6)
EOSINOPHILS RELATIVE PERCENT: 1.7 %
GFR AFRICAN AMERICAN: >60
GFR NON-AFRICAN AMERICAN: 50
GLUCOSE BLD-MCNC: 187 MG/DL (ref 70–99)
HCT VFR BLD CALC: 37.6 % (ref 40.5–52.5)
HEMOGLOBIN: 12.9 G/DL (ref 13.5–17.5)
INR BLD: 0.98 (ref 0.87–1.14)
LYMPHOCYTES ABSOLUTE: 1.1 K/UL (ref 1–5.1)
LYMPHOCYTES RELATIVE PERCENT: 12.3 %
MCH RBC QN AUTO: 30.4 PG (ref 26–34)
MCHC RBC AUTO-ENTMCNC: 34.4 G/DL (ref 31–36)
MCV RBC AUTO: 88.3 FL (ref 80–100)
MONOCYTES ABSOLUTE: 0.6 K/UL (ref 0–1.3)
MONOCYTES RELATIVE PERCENT: 6.6 %
NEUTROPHILS ABSOLUTE: 6.9 K/UL (ref 1.7–7.7)
NEUTROPHILS RELATIVE PERCENT: 79 %
PDW BLD-RTO: 14.7 % (ref 12.4–15.4)
PLATELET # BLD: 205 K/UL (ref 135–450)
PMV BLD AUTO: 7 FL (ref 5–10.5)
POTASSIUM SERPL-SCNC: 4.8 MMOL/L (ref 3.5–5.1)
PROTHROMBIN TIME: 12.9 SEC (ref 11.7–14.5)
RBC # BLD: 4.25 M/UL (ref 4.2–5.9)
SODIUM BLD-SCNC: 142 MMOL/L (ref 136–145)
TOTAL PROTEIN: 7.1 G/DL (ref 6.4–8.2)
WBC # BLD: 8.7 K/UL (ref 4–11)

## 2022-06-20 PROCEDURE — G8427 DOCREV CUR MEDS BY ELIG CLIN: HCPCS | Performed by: FAMILY MEDICINE

## 2022-06-20 PROCEDURE — 3017F COLORECTAL CA SCREEN DOC REV: CPT | Performed by: FAMILY MEDICINE

## 2022-06-20 PROCEDURE — 1036F TOBACCO NON-USER: CPT | Performed by: FAMILY MEDICINE

## 2022-06-20 PROCEDURE — 1123F ACP DISCUSS/DSCN MKR DOCD: CPT | Performed by: FAMILY MEDICINE

## 2022-06-20 PROCEDURE — 36415 COLL VENOUS BLD VENIPUNCTURE: CPT | Performed by: FAMILY MEDICINE

## 2022-06-20 PROCEDURE — 99215 OFFICE O/P EST HI 40 MIN: CPT | Performed by: FAMILY MEDICINE

## 2022-06-20 PROCEDURE — G8417 CALC BMI ABV UP PARAM F/U: HCPCS | Performed by: FAMILY MEDICINE

## 2022-06-20 PROCEDURE — 93000 ELECTROCARDIOGRAM COMPLETE: CPT | Performed by: FAMILY MEDICINE

## 2022-06-20 RX ORDER — METOPROLOL SUCCINATE 25 MG/1
25 TABLET, EXTENDED RELEASE ORAL DAILY
Qty: 30 TABLET | Refills: 0 | Status: SHIPPED | OUTPATIENT
Start: 2022-06-20 | End: 2022-07-19 | Stop reason: SDUPTHER

## 2022-06-20 ASSESSMENT — PATIENT HEALTH QUESTIONNAIRE - PHQ9
SUM OF ALL RESPONSES TO PHQ QUESTIONS 1-9: 0
SUM OF ALL RESPONSES TO PHQ QUESTIONS 1-9: 0
2. FEELING DOWN, DEPRESSED OR HOPELESS: 0
SUM OF ALL RESPONSES TO PHQ QUESTIONS 1-9: 0
SUM OF ALL RESPONSES TO PHQ QUESTIONS 1-9: 0
SUM OF ALL RESPONSES TO PHQ9 QUESTIONS 1 & 2: 0
1. LITTLE INTEREST OR PLEASURE IN DOING THINGS: 0

## 2022-06-20 NOTE — PATIENT INSTRUCTIONS
Hold aspirin, fish oil, multivitamin, vitamin c starting 5 days before surgery    Do not take glipizide, metformin, and prednisone day of surgery    Start Toprol for heart rate and pulse

## 2022-06-20 NOTE — TELEPHONE ENCOUNTER
Preoperative risk assessment    Patient planning to have right parotidectomy. He was seen by his PCP on 6/20 and had an EKG. Hx sinus tachycardia, HTN, daibetes  Last office visit 6/23/2021 (follow up as needed).

## 2022-06-20 NOTE — TELEPHONE ENCOUNTER
Please call Dr. Feliciano Cranker office to schedule patient appointment for preop evaluation. Mr. Sherlyn Nieto continues with sinus tachycardia. He is scheduled for right parotid gland removal on the 28th. He has seen Dr. Yael Crandall in past. He was started on a beta-blocker today, thank you.

## 2022-06-20 NOTE — TELEPHONE ENCOUNTER
Patient was seen for a pre op clearance today (6/20/22) with Dr Estephania Platt for a R parotidectomy. Patient has a hx of DM 2, CKD stage 3, HTN, dyslipidemia. Dr Estephania Platt is requesting patient to have cardiac clearance before surgery with Dr Isa Okeefe. Please advise. EKG done today as well. No cyanosis, clubbing or edema

## 2022-06-20 NOTE — PROGRESS NOTES
Preoperative Consultation      Carmela Walker.     Presents for preoperative evaluation, he is scheduled for right parotid gland removal on 6/28/2022, he has no concerns today, he reports he is feeling well       Chief Complaint   Patient presents with    Pre-op Exam     right parotidectomy on 6/28, daughter is with patient         Current Outpatient Medications   Medication Sig Dispense Refill    metoprolol succinate (TOPROL XL) 25 MG extended release tablet Take 1 tablet by mouth daily 30 tablet 0    hydrALAZINE (APRESOLINE) 25 MG tablet TAKE ONE TABLET BY MOUTH THREE TIMES A  tablet 3    ferrous sulfate (FE TABS 325) 325 (65 Fe) MG EC tablet Take 1 tablet by mouth in the morning and at bedtime 60 tablet 1    pioglitazone (ACTOS) 45 MG tablet Take 1 tablet by mouth daily Dose increased 90 tablet 3    blood glucose test strips (Zigfu GLUCOSE TEST) strip Test twice daily 100 strip 5    omeprazole (PRILOSEC) 20 MG delayed release capsule Take 1 capsule by mouth Daily 90 capsule 2    losartan (COZAAR) 50 MG tablet Take 1 tablet by mouth daily 90 tablet 3    tamsulosin (FLOMAX) 0.4 MG capsule TAKE ONE CAPSULE BY MOUTH DAILY 90 capsule 3    glipiZIDE (GLUCOTROL) 10 MG tablet Take 2 tablets by mouth 2 times daily Dose increased 360 tablet 1    metFORMIN (GLUCOPHAGE) 1000 MG tablet Take 1 tablet by mouth 2 times daily (with meals) 180 tablet 2    amLODIPine (NORVASC) 10 MG tablet TAKE ONE TABLET BY MOUTH DAILY 90 tablet 2    Cholecalciferol (VITAMIN D3) 50 MCG (2000 UT) CAPS TAKE ONE CAPSULE BY MOUTH DAILY 90 capsule 2    lovastatin (MEVACOR) 40 MG tablet TAKE ONE TABLET BY MOUTH ONCE NIGHTLY 90 tablet 2    predniSONE (DELTASONE) 5 MG tablet Take 1 tablet by mouth daily 10 tablet 0    aspirin 81 MG EC tablet Take 81 mg by mouth daily      Easy Touch Lancets 33G/Twist MISC USE TO TEST THREE TIMES A  each 5    acetaminophen (TYLENOL) 650 MG extended release tablet Take 650 mg by mouth every 8 hours as needed for Pain      Blood Glucose Monitoring Suppl (ONE TOUCH ULTRA 2) w/Device KIT 1 kit by Does not apply route daily 1 kit 0    Ascorbic Acid (VITAMIN C) 1000 MG tablet Take 1 tablet by mouth daily 90 tablet 3    Omega-3 Fatty Acids (FISH OIL) 1000 MG CAPS Take 1 capsule by mouth daily 90 capsule 3    Multiple Vitamins-Minerals (THERAPEUTIC MULTIVITAMIN-MINERALS) tablet Take 1 tablet by mouth daily       No current facility-administered medications for this visit.            Planned anesthesia:  General  Known anesthesia problems:  None  Bleeding risk:  Low  Personal or FH of DVT/PE:  No     Patient Active Problem List   Diagnosis    Urinary retention    Essential hypertension    BPH (benign prostatic hyperplasia)    Ureterolithiasis    [facility]    Uncontrolled type 2 diabetes mellitus with hyperglycemia ([facility]    Mixed hyperlipidemia    Anemia due to chronic blood loss    Chronic kidney disease, stage 3a ([facility]    Diabetes mellitus, type 2 (HCC)    Dyslipidemia    Dyspepsia    GERD (gastroesophageal reflux disease)    Kidney stone    Vitamin D deficiency    Serum creatinine raised        Past Medical History:   Diagnosis Date    Anemia     Diabetes mellitus (HCC)     High blood pressure     Hyperlipidemia     Reflux esophagitis     Tachycardia     Temporal arteritis (HCC)     Urinary stone 2019    stent placed 2019       Family History   Problem Relation Age of Onset    Kidney Disease Mother          from   [facility] Diabetes Mother     Cancer Brother         colon         Social History     Tobacco Use    Smoking status: Never Smoker    Smokeless tobacco: Never Used   Vaping Use    Vaping Use: Never used   Substance Use Topics    Alcohol use: No    Drug use: No          Review of Systems  ROS  Denies fever, chills, night sweats  Denies headaches, sore throat, ear pain  Denies chest pain, dyspnea, palpitations, patient care including chart review, face to face evaluation, coordination of care

## 2022-06-21 NOTE — TELEPHONE ENCOUNTER
Okay to clear him for surgery based on that note from <1 year ago. If he has any issues we can see him in the office.

## 2022-06-22 ENCOUNTER — TELEPHONE (OUTPATIENT)
Dept: FAMILY MEDICINE CLINIC | Age: 67
End: 2022-06-22

## 2022-06-22 NOTE — TELEPHONE ENCOUNTER
Michelle, please let patient know he has been okayed to proceed with surgery by cardiologist and myself, thank you

## 2022-06-24 ENCOUNTER — NURSE ONLY (OUTPATIENT)
Dept: FAMILY MEDICINE CLINIC | Age: 67
End: 2022-06-24

## 2022-06-24 VITALS — SYSTOLIC BLOOD PRESSURE: 127 MMHG | DIASTOLIC BLOOD PRESSURE: 75 MMHG | HEART RATE: 95 BPM

## 2022-06-24 DIAGNOSIS — Z01.30 BP CHECK: Primary | ICD-10-CM

## 2022-06-24 NOTE — PROGRESS NOTES
Patient seen today for bp and heart rate check. Confirmed patient has taken amlodipine, metoprolol, and losartan today. Bp today is 127/75 with HR of 95.       BP Readings from Last 3 Encounters:   06/20/22 (!) 140/70   06/15/22 (!) 161/82   06/09/22 (!) 151/95     Pulse Readings from Last 3 Encounters:   06/20/22 96   06/15/22 (!) 118   06/09/22 (!) 103

## 2022-06-24 NOTE — LETTER
[address] [place] [address]  Phone: [phone]  Fax: [phone]    Analy Yousif MD       June 28, 2022    [address] [place] [address]      Dear Radha Sterling:    Your bp readings are great. Please continue with the metoprolol and your current medications. If you have any questions or concerns, please don't hesitate to call.     Sincerely,        Analy Yousif MD

## 2022-06-27 ENCOUNTER — ANESTHESIA EVENT (OUTPATIENT)
Dept: OPERATING ROOM | Age: 67
End: 2022-06-27
Payer: MEDICARE

## 2022-06-27 NOTE — PROGRESS NOTES
[facility] time____0920________        Surgery time___1120_________    Take the following medications with a sip of water: Follow your MD/Surgeons pre-procedure instructions regarding your medications     Do not eat or drink anything after 12:00 midnight prior to your surgery. This includes water chewing gum, mints and ice chips. You may brush your teeth and gargle the morning of your surgery, but do not swallow the water     Please see your family doctor/pediatrician for a history and physical and/or concerning medications. Bring any test results/reports from your physicians office. If you are under the care of a heart doctor or specialist doctor, please be aware that you may be asked to them for clearance    You may be asked to stop blood thinners such as Coumadin, Plavix, Fragmin, Lovenox, etc., or any anti-inflammatories such as:  Aspirin, Ibuprofen, Advil, Naproxen prior to your surgery. We also ask that you stop any OTC medications such as fish oil, vitamin E, glucosamine, garlic, Multivitamins, COQ 10, etc.    We ask that you do not smoke 24 hours prior to surgery  We ask that you do not  drink any alcoholic beverages 24 hours prior to surgery     You must make arrangements for a responsible adult to take you home after your surgery. For your safety you will not be allowed to leave alone or drive yourself home. Your surgery will be cancelled if you do not have a ride home. Also for your safety, it is strongly suggested that someone stay with you the first 24 hours after your surgery. A parent or legal guardian must accompany a child scheduled for surgery and plan to stay at the hospital until the child is discharged. Please do not bring other children with you. For your comfort, please wear simple loose fitting clothing to the hospital.  Please do not bring valuables.     Do not wear any make-up or nail polish on your fingers or toes      For your safety, please do not wear any jewelry or body piercing's on the day of surgery. All jewelry must be removed. If you have dentures, they will be removed before going to operating room. For your convenience, we will provide you with a container. If you wear contact lenses or glasses, they will be removed, please bring a case for them. If you have a living will and a durable power of  for healthcare, please bring in a copy. As part of our patient safety program to minimize surgical site infections, we ask you to do the following:    · Please notify your surgeon if you develop any illness between         now and the  day of your surgery. · This includes a cough, cold, fever, sore throat, nausea,         or vomiting, and diarrhea, etc.  ·  Please notify your surgeon if you experience dizziness, shortness         of breath or blurred vision between now and the time of your surgery. Do not shave your operative site 96 hours prior to surgery. For face and neck surgery, men may use an electric razor 48 hours   prior to surgery. You may shower the night before surgery or the morning of   your surgery with an antibacterial soap. You will need to bring a photo ID and insurance card    [facility] has an onsite pharmacy, would you like to utilize our pharmacy     If you will be staying overnight and use a C-pap machine, please bring   your C-pap to hospital     Our goal is to provide you with excellent care, therefore, visitors will be limited to two(2) in the room at a time so that we may focus on providing this care for you. Please contact pre-admission testing if you have any further questions. [facility] phone number:  [phone] PAT fax number:  [phone]  Please note these are generalized instructions for all surgical cases, you may be provided with more specific instructions according to your surgery.     C-Difficile admission screening and protocol:       * Admitted with diarrhea? [] YES    [x]  NO     *Prior history of C-Diff. In last 3 months? [] YES    [x]  NO     *Antibiotic use in the past 6-8 weeks? [x]  NO    []  YES                 If yes, which ANTIBIOTIC AND REASON______     *Prior hospitalization or nursing home in the last month? []  YES    [x]  NO        SAFETY FIRST. .call before you fall

## 2022-06-28 ENCOUNTER — HOSPITAL ENCOUNTER (OUTPATIENT)
Age: 67
Setting detail: OUTPATIENT SURGERY
Discharge: HOME | End: 2022-06-28
Attending: OTOLARYNGOLOGY | Admitting: OTOLARYNGOLOGY
Payer: MEDICARE

## 2022-06-28 ENCOUNTER — ANESTHESIA (OUTPATIENT)
Dept: OPERATING ROOM | Age: 67
End: 2022-06-28
Payer: MEDICARE

## 2022-06-28 VITALS
OXYGEN SATURATION: 95 % | SYSTOLIC BLOOD PRESSURE: 148 MMHG | TEMPERATURE: 97.2 F | RESPIRATION RATE: 18 BRPM | HEART RATE: 111 BPM | HEIGHT: 67 IN | BODY MASS INDEX: 30.32 KG/M2 | DIASTOLIC BLOOD PRESSURE: 93 MMHG | WEIGHT: 193.2 LBS

## 2022-06-28 DIAGNOSIS — D11.0 RIGHT PAROTID ADENOMA: ICD-10-CM

## 2022-06-28 LAB
GLUCOSE BLD-MCNC: 195 MG/DL (ref 70–99)
GLUCOSE BLD-MCNC: 222 MG/DL (ref 70–99)
GLUCOSE BLD-MCNC: 223 MG/DL (ref 70–99)
GLUCOSE BLD-MCNC: 230 MG/DL (ref 70–99)
PERFORMED ON: ABNORMAL

## 2022-06-28 PROCEDURE — A4217 STERILE WATER/SALINE, 500 ML: HCPCS | Performed by: OTOLARYNGOLOGY

## 2022-06-28 PROCEDURE — 3700000000 HC ANESTHESIA ATTENDED CARE: Performed by: OTOLARYNGOLOGY

## 2022-06-28 PROCEDURE — 2580000003 HC RX 258: Performed by: ANESTHESIOLOGY

## 2022-06-28 PROCEDURE — 3600000005 HC SURGERY LEVEL 5 BASE: Performed by: OTOLARYNGOLOGY

## 2022-06-28 PROCEDURE — 6360000002 HC RX W HCPCS: Performed by: ANESTHESIOLOGY

## 2022-06-28 PROCEDURE — 88331 PATH CONSLTJ SURG 1 BLK 1SPC: CPT

## 2022-06-28 PROCEDURE — 3600000015 HC SURGERY LEVEL 5 ADDTL 15MIN: Performed by: OTOLARYNGOLOGY

## 2022-06-28 PROCEDURE — 6360000002 HC RX W HCPCS: Performed by: NURSE ANESTHETIST, CERTIFIED REGISTERED

## 2022-06-28 PROCEDURE — 6370000000 HC RX 637 (ALT 250 FOR IP): Performed by: ANESTHESIOLOGY

## 2022-06-28 PROCEDURE — 2500000003 HC RX 250 WO HCPCS: Performed by: OTOLARYNGOLOGY

## 2022-06-28 PROCEDURE — 2500000003 HC RX 250 WO HCPCS

## 2022-06-28 PROCEDURE — 6360000002 HC RX W HCPCS

## 2022-06-28 PROCEDURE — 7100000000 HC PACU RECOVERY - FIRST 15 MIN: Performed by: OTOLARYNGOLOGY

## 2022-06-28 PROCEDURE — 7100000001 HC PACU RECOVERY - ADDTL 15 MIN: Performed by: OTOLARYNGOLOGY

## 2022-06-28 PROCEDURE — 2709999900 HC NON-CHARGEABLE SUPPLY: Performed by: OTOLARYNGOLOGY

## 2022-06-28 PROCEDURE — 3700000001 HC ADD 15 MINUTES (ANESTHESIA): Performed by: OTOLARYNGOLOGY

## 2022-06-28 PROCEDURE — 2720000010 HC SURG SUPPLY STERILE: Performed by: OTOLARYNGOLOGY

## 2022-06-28 PROCEDURE — 88307 TISSUE EXAM BY PATHOLOGIST: CPT

## 2022-06-28 PROCEDURE — 7100000010 HC PHASE II RECOVERY - FIRST 15 MIN: Performed by: OTOLARYNGOLOGY

## 2022-06-28 PROCEDURE — 2580000003 HC RX 258

## 2022-06-28 PROCEDURE — 7100000011 HC PHASE II RECOVERY - ADDTL 15 MIN: Performed by: OTOLARYNGOLOGY

## 2022-06-28 PROCEDURE — 2580000003 HC RX 258: Performed by: OTOLARYNGOLOGY

## 2022-06-28 PROCEDURE — 42415 EXCISE PAROTID GLAND/LESION: CPT | Performed by: OTOLARYNGOLOGY

## 2022-06-28 RX ORDER — MIDAZOLAM HYDROCHLORIDE 1 MG/ML
INJECTION INTRAMUSCULAR; INTRAVENOUS
Status: COMPLETED
Start: 2022-06-28 | End: 2022-06-28

## 2022-06-28 RX ORDER — MAGNESIUM HYDROXIDE 1200 MG/15ML
LIQUID ORAL CONTINUOUS PRN
Status: COMPLETED | OUTPATIENT
Start: 2022-06-28 | End: 2022-06-28

## 2022-06-28 RX ORDER — DEXAMETHASONE SODIUM PHOSPHATE 4 MG/ML
INJECTION, SOLUTION INTRA-ARTICULAR; INTRALESIONAL; INTRAMUSCULAR; INTRAVENOUS; SOFT TISSUE PRN
Status: DISCONTINUED | OUTPATIENT
Start: 2022-06-28 | End: 2022-06-28 | Stop reason: SDUPTHER

## 2022-06-28 RX ORDER — FENTANYL CITRATE 50 UG/ML
50 INJECTION, SOLUTION INTRAMUSCULAR; INTRAVENOUS EVERY 5 MIN PRN
Status: DISCONTINUED | OUTPATIENT
Start: 2022-06-28 | End: 2022-06-28 | Stop reason: HOSPADM

## 2022-06-28 RX ORDER — OXYCODONE HYDROCHLORIDE 5 MG/1
5 TABLET ORAL PRN
Status: COMPLETED | OUTPATIENT
Start: 2022-06-28 | End: 2022-06-28

## 2022-06-28 RX ORDER — SODIUM CHLORIDE 9 MG/ML
INJECTION, SOLUTION INTRAVENOUS PRN
Status: DISCONTINUED | OUTPATIENT
Start: 2022-06-28 | End: 2022-06-28 | Stop reason: HOSPADM

## 2022-06-28 RX ORDER — PHENYLEPHRINE HCL IN 0.9% NACL 1 MG/10 ML
SYRINGE (ML) INTRAVENOUS PRN
Status: DISCONTINUED | OUTPATIENT
Start: 2022-06-28 | End: 2022-06-28 | Stop reason: SDUPTHER

## 2022-06-28 RX ORDER — SUCCINYLCHOLINE/SOD CL,ISO/PF 200MG/10ML
SYRINGE (ML) INTRAVENOUS PRN
Status: DISCONTINUED | OUTPATIENT
Start: 2022-06-28 | End: 2022-06-28 | Stop reason: SDUPTHER

## 2022-06-28 RX ORDER — BACITRACIN ZINC AND POLYMYXIN B SULFATE 500; 1000 [USP'U]/G; [USP'U]/G
OINTMENT TOPICAL
Qty: 1 EACH | Refills: 0 | Status: SHIPPED | OUTPATIENT
Start: 2022-06-28 | End: 2022-07-05

## 2022-06-28 RX ORDER — ONDANSETRON 2 MG/ML
INJECTION INTRAMUSCULAR; INTRAVENOUS PRN
Status: DISCONTINUED | OUTPATIENT
Start: 2022-06-28 | End: 2022-06-28 | Stop reason: SDUPTHER

## 2022-06-28 RX ORDER — PROPOFOL 10 MG/ML
INJECTION, EMULSION INTRAVENOUS PRN
Status: DISCONTINUED | OUTPATIENT
Start: 2022-06-28 | End: 2022-06-28 | Stop reason: SDUPTHER

## 2022-06-28 RX ORDER — FENTANYL CITRATE 50 UG/ML
25 INJECTION, SOLUTION INTRAMUSCULAR; INTRAVENOUS EVERY 5 MIN PRN
Status: DISCONTINUED | OUTPATIENT
Start: 2022-06-28 | End: 2022-06-28 | Stop reason: HOSPADM

## 2022-06-28 RX ORDER — MIDAZOLAM HYDROCHLORIDE 1 MG/ML
INJECTION INTRAMUSCULAR; INTRAVENOUS PRN
Status: DISCONTINUED | OUTPATIENT
Start: 2022-06-28 | End: 2022-06-28 | Stop reason: SDUPTHER

## 2022-06-28 RX ORDER — SODIUM CHLORIDE 0.9 % (FLUSH) 0.9 %
5-40 SYRINGE (ML) INJECTION EVERY 12 HOURS SCHEDULED
Status: DISCONTINUED | OUTPATIENT
Start: 2022-06-28 | End: 2022-06-28 | Stop reason: HOSPADM

## 2022-06-28 RX ORDER — EPHEDRINE SULFATE/0.9% NACL/PF 50 MG/5 ML
SYRINGE (ML) INTRAVENOUS PRN
Status: DISCONTINUED | OUTPATIENT
Start: 2022-06-28 | End: 2022-06-28 | Stop reason: SDUPTHER

## 2022-06-28 RX ORDER — LIDOCAINE HYDROCHLORIDE AND EPINEPHRINE 10; 10 MG/ML; UG/ML
INJECTION, SOLUTION INFILTRATION; PERINEURAL
Status: COMPLETED | OUTPATIENT
Start: 2022-06-28 | End: 2022-06-28

## 2022-06-28 RX ORDER — OXYCODONE HYDROCHLORIDE 10 MG/1
10 TABLET ORAL PRN
Status: COMPLETED | OUTPATIENT
Start: 2022-06-28 | End: 2022-06-28

## 2022-06-28 RX ORDER — LIDOCAINE HYDROCHLORIDE 20 MG/ML
INJECTION, SOLUTION EPIDURAL; INFILTRATION; INTRACAUDAL; PERINEURAL PRN
Status: DISCONTINUED | OUTPATIENT
Start: 2022-06-28 | End: 2022-06-28 | Stop reason: SDUPTHER

## 2022-06-28 RX ORDER — SODIUM CHLORIDE 0.9 % (FLUSH) 0.9 %
5-40 SYRINGE (ML) INJECTION PRN
Status: DISCONTINUED | OUTPATIENT
Start: 2022-06-28 | End: 2022-06-28 | Stop reason: HOSPADM

## 2022-06-28 RX ORDER — VASOPRESSIN 20 U/ML
INJECTION PARENTERAL PRN
Status: DISCONTINUED | OUTPATIENT
Start: 2022-06-28 | End: 2022-06-28 | Stop reason: SDUPTHER

## 2022-06-28 RX ORDER — AMOXICILLIN AND CLAVULANATE POTASSIUM 875; 125 MG/1; MG/1
1 TABLET, FILM COATED ORAL 2 TIMES DAILY
Qty: 14 TABLET | Refills: 0 | Status: SHIPPED | OUTPATIENT
Start: 2022-06-28 | End: 2022-07-05

## 2022-06-28 RX ORDER — REMIFENTANIL HYDROCHLORIDE 1 MG/ML
INJECTION, POWDER, LYOPHILIZED, FOR SOLUTION INTRAVENOUS PRN
Status: DISCONTINUED | OUTPATIENT
Start: 2022-06-28 | End: 2022-06-28 | Stop reason: SDUPTHER

## 2022-06-28 RX ORDER — HYDROCODONE BITARTRATE AND ACETAMINOPHEN 5; 325 MG/1; MG/1
1 TABLET ORAL EVERY 6 HOURS PRN
Qty: 20 TABLET | Refills: 0 | Status: SHIPPED | OUTPATIENT
Start: 2022-06-28 | End: 2022-07-03

## 2022-06-28 RX ORDER — FENTANYL CITRATE 50 UG/ML
INJECTION, SOLUTION INTRAMUSCULAR; INTRAVENOUS PRN
Status: DISCONTINUED | OUTPATIENT
Start: 2022-06-28 | End: 2022-06-28 | Stop reason: SDUPTHER

## 2022-06-28 RX ORDER — ONDANSETRON 2 MG/ML
4 INJECTION INTRAMUSCULAR; INTRAVENOUS
Status: DISCONTINUED | OUTPATIENT
Start: 2022-06-28 | End: 2022-06-28 | Stop reason: HOSPADM

## 2022-06-28 RX ADMIN — LIDOCAINE HYDROCHLORIDE 100 MG: 20 INJECTION, SOLUTION EPIDURAL; INFILTRATION; INTRACAUDAL; PERINEURAL at 10:31

## 2022-06-28 RX ADMIN — OXYCODONE 5 MG: 5 TABLET ORAL at 14:49

## 2022-06-28 RX ADMIN — Medication 200 MCG: at 10:40

## 2022-06-28 RX ADMIN — FENTANYL CITRATE 50 MCG: 50 INJECTION, SOLUTION INTRAMUSCULAR; INTRAVENOUS at 13:13

## 2022-06-28 RX ADMIN — VASOPRESSIN 2 UNITS: 20 INJECTION PARENTERAL at 10:56

## 2022-06-28 RX ADMIN — Medication 100 MCG: at 10:38

## 2022-06-28 RX ADMIN — DEXAMETHASONE SODIUM PHOSPHATE 4 MG: 4 INJECTION, SOLUTION INTRAMUSCULAR; INTRAVENOUS at 10:37

## 2022-06-28 RX ADMIN — SODIUM CHLORIDE: 9 INJECTION, SOLUTION INTRAVENOUS at 10:28

## 2022-06-28 RX ADMIN — VASOPRESSIN 2 UNITS: 20 INJECTION PARENTERAL at 11:07

## 2022-06-28 RX ADMIN — Medication 100 MCG: at 10:49

## 2022-06-28 RX ADMIN — Medication 140 MG: at 10:31

## 2022-06-28 RX ADMIN — Medication 10 MG: at 11:22

## 2022-06-28 RX ADMIN — REMIFENTANIL HYDROCHLORIDE 50 MCG: 1 INJECTION, POWDER, LYOPHILIZED, FOR SOLUTION INTRAVENOUS at 10:31

## 2022-06-28 RX ADMIN — FENTANYL CITRATE 50 MCG: 50 INJECTION INTRAMUSCULAR; INTRAVENOUS at 12:59

## 2022-06-28 RX ADMIN — FENTANYL CITRATE 50 MCG: 50 INJECTION, SOLUTION INTRAMUSCULAR; INTRAVENOUS at 13:29

## 2022-06-28 RX ADMIN — VASOPRESSIN 2 UNITS: 20 INJECTION PARENTERAL at 11:02

## 2022-06-28 RX ADMIN — PHENYLEPHRINE HYDROCHLORIDE 25 MCG/MIN: 10 INJECTION INTRAVENOUS at 11:43

## 2022-06-28 RX ADMIN — MIDAZOLAM 2 MG: 1 INJECTION INTRAMUSCULAR; INTRAVENOUS at 10:28

## 2022-06-28 RX ADMIN — ONDANSETRON 4 MG: 2 INJECTION INTRAMUSCULAR; INTRAVENOUS at 12:54

## 2022-06-28 RX ADMIN — PROPOFOL 160 MG: 10 INJECTION, EMULSION INTRAVENOUS at 10:31

## 2022-06-28 RX ADMIN — VASOPRESSIN 6 UNITS: 20 INJECTION PARENTERAL at 11:16

## 2022-06-28 RX ADMIN — REMIFENTANIL HYDROCHLORIDE 0.05 MCG/KG/MIN: 1 INJECTION, POWDER, LYOPHILIZED, FOR SOLUTION INTRAVENOUS at 10:51

## 2022-06-28 RX ADMIN — FENTANYL CITRATE 50 MCG: 50 INJECTION INTRAMUSCULAR; INTRAVENOUS at 12:57

## 2022-06-28 RX ADMIN — INSULIN HUMAN 3 UNITS: 100 INJECTION, SOLUTION PARENTERAL at 13:25

## 2022-06-28 RX ADMIN — VASOPRESSIN 4 UNITS: 20 INJECTION PARENTERAL at 11:12

## 2022-06-28 RX ADMIN — Medication 200 MCG: at 10:45

## 2022-06-28 ASSESSMENT — PAIN DESCRIPTION - LOCATION
LOCATION: NECK

## 2022-06-28 ASSESSMENT — PAIN DESCRIPTION - DESCRIPTORS
DESCRIPTORS: SORE
DESCRIPTORS: DISCOMFORT
DESCRIPTORS: SORE
DESCRIPTORS: PRESSURE

## 2022-06-28 ASSESSMENT — PAIN DESCRIPTION - ORIENTATION
ORIENTATION: RIGHT

## 2022-06-28 ASSESSMENT — PAIN DESCRIPTION - FREQUENCY
FREQUENCY: CONTINUOUS
FREQUENCY: CONTINUOUS

## 2022-06-28 ASSESSMENT — PAIN DESCRIPTION - PAIN TYPE
TYPE: SURGICAL PAIN

## 2022-06-28 ASSESSMENT — PAIN SCALES - GENERAL
PAINLEVEL_OUTOF10: 3
PAINLEVEL_OUTOF10: 7
PAINLEVEL_OUTOF10: 4
PAINLEVEL_OUTOF10: 5
PAINLEVEL_OUTOF10: 5
PAINLEVEL_OUTOF10: 7

## 2022-06-28 ASSESSMENT — PAIN DESCRIPTION - ONSET
ONSET: ON-GOING
ONSET: ON-GOING

## 2022-06-28 ASSESSMENT — ENCOUNTER SYMPTOMS: SHORTNESS OF BREATH: 0

## 2022-06-28 NOTE — OP NOTE
[address] [facility] SURGERY  OPERATIVE REPORT    Patient Name: Tomasz Gaona. YOB: 1955  Medical Record Number:  [id number]  Billing Number:  [id number]  Date of Procedure: [unfilled]  Time: 1489    Pre Operative Diagnoses:  Right parotid mass    Post Operative Diagnoses:    Same    Procedure:  Right superficial parotidectomy with facial nerve dissection (90197)       Surgeon: Damien Mansfield MD    OR Staff/ Assistant:  Circulator: Mary Flanagan RN  Surgical Assistant: Georgiana Joseph RN  Relief Circulator: Ct Membreno RN  Relief Scrub: [facility]  Scrub Person First: Alejandra Sesay  Scrub Person Second: Flory Robin  Circulator Assist: Angi Levy RN    Anesthesia:  General anesthesia. Findings:  1) right parotid mass, frozen consistent with pleomorphic adenoma. Identification and dissection of right facial nerve; all 4 branches stimulated at end of case    Indications: This is a 74 y.o. male with a right-sided parotid mass. FNA showed atypical cells, but did not confirm the exact pathology. Here today for right superficial parotidectomy. Risks and benefits discussed with the patient including alternate treatment options, Informed consent was obtained, the patient elected to proceed with the planned procedure. DETAILS OF PROCEDURE(S):   The patient was brought to the operating room placed in supine position operating table. He underwent uncomplicated general anesthesia with endotracheal intubation. The head of bed was rotated 180 degrees. The facial nerve monitoring was placed in the right face and noted to be working. A right sided Titus incision was planned and 5 mL 1% lidocaine with epinephrine was injected. He was then prepped and draped in a sterile fashion. The Titus incision was made. The parotid capsule was identified in the face and the soft tissue superficial to it was reflected off of it.   The sternocleidomastoid was identified in the neck. the greater auricular nerve was noted to be running close to the surface of the parotid capsule where the tumor was so it was sacrificed. The posterior belly of digastric was then identified and the parotid tissue was reflected off of it. Dissection was then carried off of the tragal cartilage. The main branch of the facial nerve was identified. Each branch was then dissected starting with the inferior division. Each division was dissected anteriorly until it was felt as though they were no longer deep to the tumor. The tumor as well as some necrotic tissue was removed. This was sent for frozen pathology noted to be a pleomorphic adenoma. Hemostasis was obtained with bipolar cautery. Stimulation of the nerve showed that all 4 branches were still intact. A BAKRAI drain was then placed and brought out behind the ear. This was secured with 3-0 silk. The incision was closed with simple interrupted 3-0 Vicryl sutures and a running 5-0 Prolene. Bacitracin was applied. Facial nerve monitoring was removed from the face. The head of bed was turned 180 degrees. The patient was allowed to awaken, extubated and taken recovery in stable condition. I attest that I was present for and did the entire procedure myself. Estimated Blood Loss: 100 mL               Specimens:   ID Type Source Tests Collected by Time Destination   A : a) right parotid Specimen Neck SURGICAL PATHOLOGY Charisse Villagran MD 6/28/2022 1222               Drains: right face BAKARI drain     Complications: There were no complications.     Charisse Villagran MD

## 2022-06-28 NOTE — H&P
I have reviewed this patient's history and physical.  There have been no significant changes. The patient was counseled at length about the risks of chaitanya Covid-19 during their perioperative period and any recovery window from their procedure. The patient was made aware that chaitanya Covid-19  may worsen their prognosis for recovering from their procedure  and lend to a higher morbidity and/or mortality risk. All material risks, benefits, and reasonable alternatives including postponing the procedure were discussed. The patient does wish to proceed with the procedure at this time. The patient understands the risk of a right parotidectomy including damage to facial nerve, cosmetic changes to face, infection and recurrence. He agrees to proceed.

## 2022-06-28 NOTE — PROGRESS NOTES
Patient arrived to PACU and placed on monitor. Right neck incision clean, dry and intact. No drainage or swelling noted. Ice applied to surgical incision. Will continue to closely monitor.

## 2022-06-28 NOTE — PROGRESS NOTES
Discharge instructions given to patient, wife and daughter, including care and emptying of BAKARI drain. Verbalize understanding.

## 2022-06-28 NOTE — ANESTHESIA PRE PROCEDURE
Department of Anesthesiology  Preprocedure Note       Name:  Dylon Cam Age:  74 y.o.  :  1955                                          MRN:  [id number]         Date:  2022      Surgeon: Diamond Vazquez):  Deonte Contreras MD    Procedure: RIGHT PAROTIDECTOMY (Right )    Medications prior to admission:   Prior to Admission medications    Medication Sig Start Date End Date Taking?  Authorizing Provider   metoprolol succinate (TOPROL XL) 25 MG extended release tablet Take 1 tablet by mouth daily 22   Ermalinda Severin Cieraszynski, DO   hydrALAZINE (APRESOLINE) 25 MG tablet TAKE ONE TABLET BY MOUTH THREE TIMES A DAY 22   SERGIO Zuh CNP   ferrous sulfate (FE TABS 325) 325 (65 Fe) MG EC tablet Take 1 tablet by mouth in the morning and at bedtime 22   SERGIO Zhu CNP   pioglitazone (ACTOS) 45 MG tablet Take 1 tablet by mouth daily Dose increased 22   Ermalinda Severin Cieraszynski, DO   blood glucose test strips (Extend [facility] GLUCOSE TEST) strip Test twice daily 22   Yoana Gallardo MD   omeprazole (PRILOSEC) 20 MG delayed release capsule Take 1 capsule by mouth Daily 3/28/22   Ermalinda Severin Cieraszynski, DO   losartan (COZAAR) 50 MG tablet Take 1 tablet by mouth daily 3/18/22   Ermalinda Severin Cieraszynski, DO   tamsulosin (FLOMAX) 0.4 MG capsule TAKE ONE CAPSULE BY MOUTH DAILY 3/14/22   Ermalinda Severin Cieraszynski, DO   glipiZIDE (GLUCOTROL) 10 MG tablet Take 2 tablets by mouth 2 times daily Dose increased 3/8/22   Ermalinda Severin Cieraszynski, DO   metFORMIN (GLUCOPHAGE) 1000 MG tablet Take 1 tablet by mouth 2 times daily (with meals) 2/15/22   Ermalinda Severin Cieraszynski, DO   amLODIPine (NORVASC) 10 MG tablet TAKE ONE TABLET BY MOUTH DAILY 22   Ermalinda Severin Cieraszynski, DO   Cholecalciferol (VITAMIN D3) 50 MCG ( UT) CAPS TAKE ONE CAPSULE BY MOUTH DAILY 21   Lashawn Curry MD   lovastatin (MEVACOR) 40 MG tablet TAKE ONE TABLET BY MOUTH ONCE NIGHTLY  Patient taking differently: Take 40 mg by mouth nightly  9/24/21 6/20/22  Michi Molina MD   predniSONE (DELTASONE) 5 MG tablet Take 1 tablet by mouth daily 9/17/21   Woodrow Curry DO   lovastatin (MEVACOR) 40 MG tablet Take 1 tablet by mouth nightly 6/24/21   Flavio Prasanna, APRN - CNP   aspirin 81 MG EC tablet Take 81 mg by mouth daily    Historical Provider, MD   Easy Touch Lancets 33G/Twist MISC USE TO TEST THREE TIMES A DAY 6/22/21   Michi Molina MD   acetaminophen (TYLENOL) 650 MG extended release tablet Take 650 mg by mouth every 8 hours as needed for Pain    Historical Provider, MD   Cholecalciferol (VITAMIN D3) 50 MCG (2000 UT) CAPS TAKE ONE CAPSULE BY MOUTH DAILY 12/21/20   Genesis Boswell MD   Blood Glucose Monitoring Suppl (ONE TOUCH ULTRA 2) w/Device KIT 1 kit by Does not apply route daily 4/28/20   Michi Molina MD   sitaGLIPtan-metformin (JANUMET)  MG per tablet Take 1 tablet by mouth 2 times daily (with meals) 2/3/20   Michi Molina MD   Ascorbic Acid (VITAMIN C) 1000 MG tablet Take 1 tablet by mouth daily 11/7/19   Michi Molina MD   Omega-3 Fatty Acids (FISH OIL) 1000 MG CAPS Take 1 capsule by mouth daily 11/7/19   Michi Molina MD   Multiple Vitamins-Minerals (THERAPEUTIC MULTIVITAMIN-MINERALS) tablet Take 1 tablet by mouth daily    Historical Provider, MD       Current medications:    No current facility-administered medications for this visit. No current outpatient medications on file.      Facility-Administered Medications Ordered in Other Visits   Medication Dose Route Frequency Provider Last Rate Last Admin    sodium chloride flush 0.9 % injection 5-40 mL  5-40 mL IntraVENous 2 times per day Kishore Mazariegos MD        sodium chloride flush 0.9 % injection 5-40 mL  5-40 mL IntraVENous PRN Kishore Mazariegos MD        0.9 % sodium chloride infusion   IntraVENous PRN Kishore Mazariegos MD           Allergies:  No Known Allergies    Problem List:    Patient Active Problem List   Diagnosis Code    Urinary retention R33.9    Essential hypertension I10    BPH (benign prostatic hyperplasia) N40.0    Ureterolithiasis N20.1    GCA (giant cell arteritis) (HCC) M31.6    Uncontrolled type 2 diabetes mellitus with hyperglycemia (HCC) E11.65    Mixed hyperlipidemia E78.2    Anemia due to chronic blood loss D50.0    Chronic kidney disease, stage 3a (HCC) N18.31    Diabetes mellitus, type 2 (HCC) E11.9    Dyslipidemia E78.5    Dyspepsia R10.13    GERD (gastroesophageal reflux disease) K21.9    Kidney stone N20.0    Vitamin D deficiency E55.9    Serum creatinine raised R79.89       Past Medical History:        Diagnosis Date    Anemia     Arthritis     Diabetes mellitus (HCC)     Enlarged prostate     High blood pressure     Hyperlipidemia     Reflux esophagitis     Risk for falls     Tachycardia     Temporal arteritis (HCC)     Urinary stone 06/18/2019    stent placed 6/18/2019       Past Surgical History:        Procedure Laterality Date    APPENDECTOMY  01/01/1984    COLONOSCOPY  08/27/2021    CYSTOSCOPY Left 6/18/2019    CYSTOSCOPY LEFT URETERAL STENT INSERTION performed by Annalee Worthington MD at [address] [place] [address] History:    Social History     Tobacco Use    Smoking status: Never Smoker    Smokeless tobacco: Never Used   Substance Use Topics    Alcohol use: No                                Counseling given: Not Answered      Vital Signs (Current): There were no vitals filed for this visit.                                            BP Readings from Last 3 Encounters:   06/28/22 (!) 203/98   06/24/22 127/75   06/20/22 (!) 140/70       NPO Status:   >8hrs                                                                                BMI:   Wt Readings from Last 3 Encounters:   06/28/22 193 lb 3.2 oz (87.6 kg)   06/20/22 198 lb 9.6 oz (90.1 kg)   06/15/22 199 lb (90.3 kg)     There is no height or weight on file to calculate BMI.    CBC:   Lab Results   Component Value Date WBC 8.7 06/20/2022    RBC 4.25 06/20/2022    HGB 12.9 06/20/2022    HCT 37.6 06/20/2022    MCV 88.3 06/20/2022    RDW 14.7 06/20/2022     06/20/2022       CMP:   Lab Results   Component Value Date     06/20/2022    K 4.8 06/20/2022    K 3.8 06/25/2019     06/20/2022    CO2 24 06/20/2022    BUN 27 06/20/2022    CREATININE 1.4 06/20/2022    GFRAA >60 06/20/2022    AGRATIO 2.2 06/20/2022    LABGLOM 50 06/20/2022    GLUCOSE 187 06/20/2022    PROT 7.1 06/20/2022    CALCIUM 10.5 06/20/2022    BILITOT 0.6 06/20/2022    ALKPHOS 79 06/20/2022    AST 18 06/20/2022    ALT 23 06/20/2022       POC Tests:   Recent Labs     06/28/22  0949   POCGLU 195*       Coags:   Lab Results   Component Value Date    PROTIME 12.9 06/20/2022    INR 0.98 06/20/2022    APTT 34.1 06/20/2022       HCG (If Applicable): No results found for: PREGTESTUR, PREGSERUM, HCG, HCGQUANT     ABGs: No results found for: PHART, PO2ART, KQS1JTE, DKH4TDD, BEART, K4HLIREK     Type & Screen (If Applicable):  No results found for: LABABO, [address]    Anesthesia Evaluation  Patient summary reviewed no history of anesthetic complications:   Airway: Mallampati: III  TM distance: >3 FB   Neck ROM: full  Mouth opening: > = 3 FB   Dental: normal exam     Comment: No loose teeth    Pulmonary: breath sounds clear to auscultation      (-) COPD, asthma, shortness of breath, recent URI and sleep apnea                           Cardiovascular:    (+) hypertension:, hyperlipidemia    (-) valvular problems/murmurs, past MI, CAD, CABG/stent, dysrhythmias,  angina,  CHF, orthopnea and no pulmonary hypertension      Rhythm: regular  Rate: normal                    Neuro/Psych:      (-) seizures, neuromuscular disease, TIA, CVA, headaches and psychiatric history           GI/Hepatic/Renal:   (+) GERD: well controlled, renal disease: kidney stones and CRI,      (-) PUD, hepatitis, liver disease and bowel prep       Endo/Other:    (+) Diabetes (A1c 8.3% May 2022)Type II DM, , : arthritis: OA., .    (-) hypothyroidism, hyperthyroidism, blood dyscrasia                ROS comment: Sepsis  Abdominal:             Vascular: Other Findings:             Anesthesia Plan      general     ASA 3       Induction: intravenous. MIPS: Prophylactic antiemetics administered. Anesthetic plan and risks discussed with patient. Plan discussed with CRNA. This pre-anesthesia assessment may be used as a history and physical.    DOS STAFF ADDENDUM:    Pt seen and examined, chart reviewed (including anesthesia, drug and allergy history). No interval changes to history and physical examination. Anesthetic plan, risks, benefits, alternatives, and personnel involved discussed with patient. Patient verbalized an understanding and agrees to proceed.       Ben Mcclellan MD  June 28, 2022  9:56 AM      Ben Mcclellan MD   6/28/2022

## 2022-06-28 NOTE — PROGRESS NOTES
Assisted up to BR to void and then to recliner. Scripts received from Bob. Wife and daughter at bedside.

## 2022-06-30 ENCOUNTER — OFFICE VISIT (OUTPATIENT)
Dept: ENT CLINIC | Age: 67
End: 2022-06-30

## 2022-06-30 VITALS — HEART RATE: 94 BPM | DIASTOLIC BLOOD PRESSURE: 87 MMHG | SYSTOLIC BLOOD PRESSURE: 153 MMHG | OXYGEN SATURATION: 97 %

## 2022-06-30 DIAGNOSIS — K11.8 PAROTID MASS: Primary | ICD-10-CM

## 2022-06-30 PROCEDURE — 99024 POSTOP FOLLOW-UP VISIT: CPT | Performed by: OTOLARYNGOLOGY

## 2022-06-30 NOTE — PROGRESS NOTES
Is following up for his parotidectomy that I did on Tuesday. Here to have his drain removed. Minimal output. He is doing well overall. Complains of numbness of the earlobe. Exam  Patient nerve is intact. There is a bit of edema of the right face without any evidence of fluid collection. Serosanguineous drainage from the [address]. This was removed. Sutures from the Morgan Belch incision are intact. Pathology showed a completely excised pleomorphic adenoma    Plan  Patient will follow-up on Wednesday for removal of sutures.

## 2022-07-06 ENCOUNTER — PROCEDURE VISIT (OUTPATIENT)
Dept: ENT CLINIC | Age: 67
End: 2022-07-06

## 2022-07-06 VITALS — BODY MASS INDEX: 30.92 KG/M2 | WEIGHT: 197 LBS | HEIGHT: 67 IN

## 2022-07-06 DIAGNOSIS — K11.8 PAROTID MASS: Primary | ICD-10-CM

## 2022-07-06 PROCEDURE — 99024 POSTOP FOLLOW-UP VISIT: CPT | Performed by: OTOLARYNGOLOGY

## 2022-07-12 DIAGNOSIS — E78.2 MIXED HYPERLIPIDEMIA: ICD-10-CM

## 2022-07-12 RX ORDER — ACETAMINOPHEN 160 MG
1 TABLET,DISINTEGRATING ORAL DAILY
Qty: 90 CAPSULE | Refills: 2 | Status: SHIPPED | OUTPATIENT
Start: 2022-07-12

## 2022-07-12 RX ORDER — LOVASTATIN 40 MG/1
TABLET ORAL
Qty: 90 TABLET | Refills: 2 | Status: SHIPPED | OUTPATIENT
Start: 2022-07-12

## 2022-07-12 NOTE — TELEPHONE ENCOUNTER
Medication:   Requested Prescriptions     Pending Prescriptions Disp Refills    lovastatin (MEVACOR) 40 MG tablet 90 tablet 2     Sig: TAKE ONE TABLET BY MOUTH ONCE NIGHTLY    Cholecalciferol (VITAMIN D3) 50 MCG (2000 UT) CAPS 90 capsule 2     Sig: Take 1 capsule by mouth daily       Last Filled:      Patient Phone Number: [phone] (home)     Last appt: 6/20/2022   Next appt: Visit date not found

## 2022-07-18 ENCOUNTER — TELEPHONE (OUTPATIENT)
Dept: FAMILY MEDICINE CLINIC | Age: 67
End: 2022-07-18

## 2022-07-18 NOTE — TELEPHONE ENCOUNTER
Wanting to know if he is supposed to still be taking his beta blocker? If so, he is out of med, and will need a refill called into [facility] [phone].   Pt is reachable at [phone]

## 2022-07-19 RX ORDER — METOPROLOL SUCCINATE 25 MG/1
25 TABLET, EXTENDED RELEASE ORAL DAILY
Qty: 90 TABLET | Refills: 3 | Status: SHIPPED | OUTPATIENT
Start: 2022-07-19

## 2022-08-03 ENCOUNTER — OFFICE VISIT (OUTPATIENT)
Dept: ENT CLINIC | Age: 67
End: 2022-08-03

## 2022-08-03 VITALS
HEART RATE: 112 BPM | BODY MASS INDEX: 31.55 KG/M2 | DIASTOLIC BLOOD PRESSURE: 72 MMHG | SYSTOLIC BLOOD PRESSURE: 117 MMHG | HEIGHT: 67 IN | WEIGHT: 201 LBS

## 2022-08-03 DIAGNOSIS — K11.8 PAROTID MASS: Primary | ICD-10-CM

## 2022-08-03 PROCEDURE — 99024 POSTOP FOLLOW-UP VISIT: CPT | Performed by: OTOLARYNGOLOGY

## 2022-08-03 NOTE — PROGRESS NOTES
Patient is following up for his parotidectomy that did on June 28, 2022. Overall doing well. He still has a bit of discomfort. He specifically has some pain whenever he chews. Otherwise he is doing well. Exam  Incisions healing very well on the right side. He does have asymmetry, this is more from missing some of the parotid gland. Minimal pain when palpating. Plan  Patient is healing very well. He is good to have some numbness long-term from the surgery. The pain that he is describing probably is a bit of neuropathic pain. He says that Motrin alone helps it. He also may have what is called first bite syndrome that is somewhat poorly understood, but will typically go away over time.   Follow-up with me if he develops any new symptoms

## 2022-08-16 NOTE — TELEPHONE ENCOUNTER
Medication:   Requested Prescriptions     Pending Prescriptions Disp Refills    ferrous sulfate (FE TABS 325) 325 (65 Fe) MG EC tablet [Pharmacy Med Name: FERROUS SULF  MG TABLET] 60 tablet 1     Sig: TAKE ONE TABLET BY MOUTH TWICE A DAY IN THE MORNING AND AT BEDTIME       Last Filled:      Patient Phone Number: [phone] (home)     Last appt: 6/20/2022   Next appt: Visit date not found

## 2022-08-18 RX ORDER — LANOLIN ALCOHOL/MO/W.PET/CERES
CREAM (GRAM) TOPICAL
Qty: 60 TABLET | Refills: 1 | Status: SHIPPED | OUTPATIENT
Start: 2022-08-18 | End: 2022-10-24 | Stop reason: SDUPTHER

## 2022-09-21 ENCOUNTER — NURSE ONLY (OUTPATIENT)
Dept: FAMILY MEDICINE CLINIC | Age: 67
End: 2022-09-21
Payer: MEDICARE

## 2022-09-21 DIAGNOSIS — Z23 NEED FOR INFLUENZA VACCINATION: Primary | ICD-10-CM

## 2022-09-21 PROCEDURE — G0008 ADMIN INFLUENZA VIRUS VAC: HCPCS | Performed by: FAMILY MEDICINE

## 2022-09-21 PROCEDURE — 90694 VACC AIIV4 NO PRSRV 0.5ML IM: CPT | Performed by: FAMILY MEDICINE

## 2022-09-21 RX ORDER — LANCETS 33 GAUGE
EACH MISCELLANEOUS
Qty: 100 EACH | Refills: 5 | Status: SHIPPED | OUTPATIENT
Start: 2022-09-21

## 2022-09-21 NOTE — TELEPHONE ENCOUNTER
Medication:   Requested Prescriptions     Pending Prescriptions Disp Refills    Easy Touch Lancets 33G/Twist MISC 100 each 5     Sig: USE TO TEST THREE TIMES A DAY       Last Filled:      Patient Phone Number: [phone] (home)     Last appt: 6/20/2022   Next appt: Visit date not found

## 2022-09-21 NOTE — PROGRESS NOTES
Vaccine Information Sheet, \"Influenza - Inactivated\"  given to Mary Silver, or parent/legal guardian of  Mary Doyle. and verbalized understanding. Patient responses:    Have you received any other vaccine within the last 14 days? No  Do you currently have an active infectious or acute respiratory illness or fever? No  Have you ever had a reaction to a flu vaccine? No  Do you have any current illness? No  Have you ever had Guillian Conley Syndrome? No  Do you have a serious allergy to any of the follow: Neomycin, Polymyxin, Thimerosal, eggs or egg products? No      Flu vaccine given per order. Please see immunization tab. Risks and benefits explained. Current VIS given.       Immunizations Administered       Name Date Dose Route    Influenza, FLUAD, (age 72 y+), Adjuvanted, 0.5mL 9/21/2022 0.5 mL Intramuscular    Site: Deltoid- Right    Lot: [id number]    NDC: 83163-010-71

## 2022-10-06 RX ORDER — GLIPIZIDE 10 MG/1
TABLET ORAL
Qty: 360 TABLET | Refills: 1 | Status: SHIPPED | OUTPATIENT
Start: 2022-10-06

## 2022-10-24 RX ORDER — LANOLIN ALCOHOL/MO/W.PET/CERES
CREAM (GRAM) TOPICAL
Qty: 180 TABLET | Refills: 1 | Status: SHIPPED | OUTPATIENT
Start: 2022-10-24

## 2022-10-24 NOTE — TELEPHONE ENCOUNTER
Medication:   Requested Prescriptions     Pending Prescriptions Disp Refills    ferrous sulfate (FE TABS 325) 325 (65 Fe) MG EC tablet 180 tablet 1     Sig: TAKE ONE TABLET BY MOUTH TWICE A DAY IN THE MORNING AND AT BEDTIME       Last Filled:      Patient Phone Number: [phone] (home)     Last appt: 6/20/2022   Next appt: Visit date not found

## 2022-10-24 NOTE — TELEPHONE ENCOUNTER
Patient is calling requesting refills for:    Medication:   Requested Prescriptions     Pending Prescriptions Disp Refills    ferrous sulfate (FE TABS 325) 325 (65 Fe) MG EC tablet 60 tablet 1         Patient Phone Number: [phone] (home)     Last appt: 6/20/2022   Next appt: Visit date not found    Pharmacy:   [facility] [id number] [address]. [facility] [phone] - F [phone]  [address] [place] [address].   [place] [address]  Phone: [phone] Fax: [phone]

## 2022-12-02 DIAGNOSIS — M75.20 BICEPS TENDONITIS: ICD-10-CM

## 2022-12-02 DIAGNOSIS — M25.512 SHOULDER PAIN, LEFT: ICD-10-CM

## 2022-12-02 RX ORDER — AMLODIPINE BESYLATE 10 MG/1
TABLET ORAL
Qty: 90 TABLET | Refills: 2 | Status: SHIPPED | OUTPATIENT
Start: 2022-12-02

## 2023-02-06 ENCOUNTER — OFFICE VISIT (OUTPATIENT)
Dept: FAMILY MEDICINE CLINIC | Age: 68
End: 2023-02-06
Payer: MEDICARE

## 2023-02-06 VITALS
HEART RATE: 97 BPM | OXYGEN SATURATION: 98 % | SYSTOLIC BLOOD PRESSURE: 135 MMHG | HEIGHT: 67 IN | BODY MASS INDEX: 32.18 KG/M2 | WEIGHT: 205 LBS | DIASTOLIC BLOOD PRESSURE: 85 MMHG

## 2023-02-06 DIAGNOSIS — R10.9 LEFT SIDED ABDOMINAL PAIN: ICD-10-CM

## 2023-02-06 DIAGNOSIS — M31.6 GCA (GIANT CELL ARTERITIS) (HCC): ICD-10-CM

## 2023-02-06 DIAGNOSIS — E11.65 UNCONTROLLED TYPE 2 DIABETES MELLITUS WITH HYPERGLYCEMIA (HCC): ICD-10-CM

## 2023-02-06 DIAGNOSIS — R07.89 OTHER CHEST PAIN: Primary | ICD-10-CM

## 2023-02-06 DIAGNOSIS — N18.30 STAGE 3 CHRONIC KIDNEY DISEASE, UNSPECIFIED WHETHER STAGE 3A OR 3B CKD (HCC): ICD-10-CM

## 2023-02-06 DIAGNOSIS — R39.15 URINARY URGENCY: ICD-10-CM

## 2023-02-06 DIAGNOSIS — K42.9 UMBILICAL HERNIA WITHOUT OBSTRUCTION AND WITHOUT GANGRENE: ICD-10-CM

## 2023-02-06 DIAGNOSIS — R53.81 PHYSICAL DECONDITIONING: ICD-10-CM

## 2023-02-06 DIAGNOSIS — Z91.81 AT HIGH RISK FOR FALLS: ICD-10-CM

## 2023-02-06 PROCEDURE — 3046F HEMOGLOBIN A1C LEVEL >9.0%: CPT | Performed by: FAMILY MEDICINE

## 2023-02-06 PROCEDURE — G8417 CALC BMI ABV UP PARAM F/U: HCPCS | Performed by: FAMILY MEDICINE

## 2023-02-06 PROCEDURE — G8427 DOCREV CUR MEDS BY ELIG CLIN: HCPCS | Performed by: FAMILY MEDICINE

## 2023-02-06 PROCEDURE — 3075F SYST BP GE 130 - 139MM HG: CPT | Performed by: FAMILY MEDICINE

## 2023-02-06 PROCEDURE — 3017F COLORECTAL CA SCREEN DOC REV: CPT | Performed by: FAMILY MEDICINE

## 2023-02-06 PROCEDURE — 2022F DILAT RTA XM EVC RTNOPTHY: CPT | Performed by: FAMILY MEDICINE

## 2023-02-06 PROCEDURE — 1036F TOBACCO NON-USER: CPT | Performed by: FAMILY MEDICINE

## 2023-02-06 PROCEDURE — 99215 OFFICE O/P EST HI 40 MIN: CPT | Performed by: FAMILY MEDICINE

## 2023-02-06 PROCEDURE — G8484 FLU IMMUNIZE NO ADMIN: HCPCS | Performed by: FAMILY MEDICINE

## 2023-02-06 PROCEDURE — 1123F ACP DISCUSS/DSCN MKR DOCD: CPT | Performed by: FAMILY MEDICINE

## 2023-02-06 PROCEDURE — 93000 ELECTROCARDIOGRAM COMPLETE: CPT | Performed by: FAMILY MEDICINE

## 2023-02-06 PROCEDURE — 3079F DIAST BP 80-89 MM HG: CPT | Performed by: FAMILY MEDICINE

## 2023-02-06 PROCEDURE — 36415 COLL VENOUS BLD VENIPUNCTURE: CPT | Performed by: FAMILY MEDICINE

## 2023-02-06 PROCEDURE — 3288F FALL RISK ASSESSMENT DOCD: CPT | Performed by: FAMILY MEDICINE

## 2023-02-06 SDOH — ECONOMIC STABILITY: INCOME INSECURITY: HOW HARD IS IT FOR YOU TO PAY FOR THE VERY BASICS LIKE FOOD, HOUSING, MEDICAL CARE, AND HEATING?: NOT HARD AT ALL

## 2023-02-06 SDOH — ECONOMIC STABILITY: HOUSING INSECURITY
IN THE LAST 12 MONTHS, WAS THERE A TIME WHEN YOU DID NOT HAVE A STEADY PLACE TO SLEEP OR SLEPT IN A SHELTER (INCLUDING NOW)?: NO

## 2023-02-06 SDOH — ECONOMIC STABILITY: FOOD INSECURITY: WITHIN THE PAST 12 MONTHS, YOU WORRIED THAT YOUR FOOD WOULD RUN OUT BEFORE YOU GOT MONEY TO BUY MORE.: NEVER TRUE

## 2023-02-06 SDOH — ECONOMIC STABILITY: FOOD INSECURITY: WITHIN THE PAST 12 MONTHS, THE FOOD YOU BOUGHT JUST DIDN'T LAST AND YOU DIDN'T HAVE MONEY TO GET MORE.: NEVER TRUE

## 2023-02-06 ASSESSMENT — PATIENT HEALTH QUESTIONNAIRE - PHQ9
SUM OF ALL RESPONSES TO PHQ QUESTIONS 1-9: 0
SUM OF ALL RESPONSES TO PHQ9 QUESTIONS 1 & 2: 0
SUM OF ALL RESPONSES TO PHQ QUESTIONS 1-9: 0
1. LITTLE INTEREST OR PLEASURE IN DOING THINGS: 0
2. FEELING DOWN, DEPRESSED OR HOPELESS: 0

## 2023-02-06 NOTE — PROGRESS NOTES
A/P:    Diagnosis Orders   1. Other chest pain  CBC with Auto Differential    EKG 12 lead    Antonietta Hansen MD, Cardiology, [facility]      2. At high risk for falls  [facility] Physical Therapy  [facility] ([facility]      3. GCA (giant cell arteritis) ([address]        4. Uncontrolled type 2 diabetes mellitus with hyperglycemia (HCC)  Hemoglobin A1C    MICROALBUMIN / CREATININE URINE RATIO    LDL Cholesterol, Direct    Comprehensive Metabolic Panel      5. Stage 3 chronic kidney disease, unspecified whether stage 3a or 3b CKD (HCC)  Comprehensive Metabolic Panel    CBC with Auto Differential      6. Physical deconditioning  [facility]      7. Urinary urgency  Urinalysis with Microscopic      8. Umbilical hernia without obstruction and without gangrene  Marvin Valle MD, General Surgery, [facility]      9.  Left sided abdominal pain  Marvin Valle MD, General Surgery, [facility]        Chest pain and dyspnea wise, his EKG appears stable, he agrees to be evaluated in the ER with worsening symptoms, referral to cardiology placed    For his fall history, deconditioning, decreasing strength, he agrees to physical therapy referral    For his uncontrolled diabetes, check above labs    CKD wise, his GFR was stable at last check, check above labs    For his definite umbilical hernia and left-sided abdominal pain, have placed referral to general surgeon in the office to get an opinion    For his general syndrome for his giant cell arteritis, he is no longer following with ophthalmology, we discussed possible trial of decreased prednisone dose to 4 mg daily    Follow-up in 3 months or sooner if needed    Over 40 minutes was spent in patient care including chart review, face to face evaluation, coordination of care      O: /85   Pulse 97   Ht 5' 7\" (1.702 m)   Wt 205 lb (93 kg)   SpO2 98%   BMI 32.11 kg/m²    Gen- NAD, pleasant  HEENT- Eyes without icterus or injection  Neck- Supple, no lymphadenopathy appreciated  Lungs- CTAB  Heart- RRR  Abd- Soft, mildly tender, reducible umbilical hernia, I cannot appreciate a hernia on the left, but patient with increased abdominal girth  Ext- No edema  Psych- Appropriate    S: CC- check up  HPI-Caden presents for follow-up of diabetes, chronic kidney disease, hypertension. He reports deconditioning, feeling like he is going to fall over with standing at times. He also reports mild dyspnea and chest tightness with exertion for the past year. It resolves with rest.  He thinks he has an episode about once per month or a bit more. He continues with prednisone for his temporal arteritis. He reports he has been on it for about 5 years and has not followed up with ophthalmology in a long time. He also has ongoing left abdominal pain. He has umbilical pain at baseline. He reports his fasting sugars have been around 1 . He reports compliance with meds. Patient also reports urinary urgency, frequency for the past 2 months. He denies dysuria.     ROS- Per HPI    Patient's medications, allergies, and past medical hx were reviewed

## 2023-02-07 ENCOUNTER — NURSE ONLY (OUTPATIENT)
Dept: FAMILY MEDICINE CLINIC | Age: 68
End: 2023-02-07

## 2023-02-07 ENCOUNTER — TELEPHONE (OUTPATIENT)
Dept: FAMILY MEDICINE CLINIC | Age: 68
End: 2023-02-07

## 2023-02-07 DIAGNOSIS — I10 ESSENTIAL HYPERTENSION: Primary | ICD-10-CM

## 2023-02-07 DIAGNOSIS — R82.90 ABNORMAL URINALYSIS: Primary | ICD-10-CM

## 2023-02-07 LAB
A/G RATIO: 1.8 (ref 1.1–2.2)
ALBUMIN SERPL-MCNC: 4.5 G/DL (ref 3.4–5)
ALP BLD-CCNC: 80 U/L (ref 40–129)
ALT SERPL-CCNC: 24 U/L (ref 10–40)
ANION GAP SERPL CALCULATED.3IONS-SCNC: 17 MMOL/L (ref 3–16)
AST SERPL-CCNC: 18 U/L (ref 15–37)
BACTERIA: ABNORMAL /HPF
BASOPHILS ABSOLUTE: 0.1 K/UL (ref 0–0.2)
BASOPHILS RELATIVE PERCENT: 0.6 %
BILIRUB SERPL-MCNC: 0.7 MG/DL (ref 0–1)
BILIRUBIN URINE: NEGATIVE
BLOOD, URINE: NEGATIVE
BUDDING YEAST: PRESENT
BUN BLDV-MCNC: 31 MG/DL (ref 7–20)
CALCIUM SERPL-MCNC: 10.2 MG/DL (ref 8.3–10.6)
CHLORIDE BLD-SCNC: 99 MMOL/L (ref 99–110)
CLARITY: ABNORMAL
CO2: 22 MMOL/L (ref 21–32)
COLOR: YELLOW
CREAT SERPL-MCNC: 1.8 MG/DL (ref 0.8–1.3)
EOSINOPHILS ABSOLUTE: 0.2 K/UL (ref 0–0.6)
EOSINOPHILS RELATIVE PERCENT: 2 %
EPITHELIAL CELLS, UA: 1 /HPF (ref 0–5)
ESTIMATED AVERAGE GLUCOSE: 194.4 MG/DL
GFR SERPL CREATININE-BSD FRML MDRD: 40 ML/MIN/{1.73_M2}
GLUCOSE BLD-MCNC: 189 MG/DL (ref 70–99)
GLUCOSE URINE: NEGATIVE MG/DL
HBA1C MFR BLD: 8.4 %
HCT VFR BLD CALC: 37.9 % (ref 40.5–52.5)
HEMOGLOBIN: 12.6 G/DL (ref 13.5–17.5)
HYALINE CASTS: 1 /LPF (ref 0–8)
KETONES, URINE: NEGATIVE MG/DL
LDL CHOLESTEROL DIRECT: 103 MG/DL
LEUKOCYTE ESTERASE, URINE: ABNORMAL
LYMPHOCYTES ABSOLUTE: 1.4 K/UL (ref 1–5.1)
LYMPHOCYTES RELATIVE PERCENT: 13.9 %
MCH RBC QN AUTO: 30.4 PG (ref 26–34)
MCHC RBC AUTO-ENTMCNC: 33.4 G/DL (ref 31–36)
MCV RBC AUTO: 91.2 FL (ref 80–100)
MICROSCOPIC EXAMINATION: YES
MONOCYTES ABSOLUTE: 0.6 K/UL (ref 0–1.3)
MONOCYTES RELATIVE PERCENT: 6.3 %
NEUTROPHILS ABSOLUTE: 7.9 K/UL (ref 1.7–7.7)
NEUTROPHILS RELATIVE PERCENT: 77.2 %
NITRITE, URINE: NEGATIVE
PDW BLD-RTO: 14.4 % (ref 12.4–15.4)
PH UA: 5.5 (ref 5–8)
PLATELET # BLD: 200 K/UL (ref 135–450)
PMV BLD AUTO: 7.5 FL (ref 5–10.5)
POTASSIUM SERPL-SCNC: 4.3 MMOL/L (ref 3.5–5.1)
PROTEIN UA: 300 MG/DL
RBC # BLD: 4.16 M/UL (ref 4.2–5.9)
RBC UA: 10 /HPF (ref 0–4)
REASON FOR REJECTION: NORMAL
REJECTED TEST: NORMAL
SODIUM BLD-SCNC: 138 MMOL/L (ref 136–145)
SPECIFIC GRAVITY UA: 1.02 (ref 1–1.03)
TOTAL PROTEIN: 7 G/DL (ref 6.4–8.2)
URINE TYPE: ABNORMAL
UROBILINOGEN, URINE: 0.2 E.U./DL
WBC # BLD: 10.2 K/UL (ref 4–11)
WBC UA: 994 /HPF (ref 0–5)

## 2023-02-07 NOTE — TELEPHONE ENCOUNTER
FYI- unable to run Urine microalbumin/creatinine due to specimen being collected in speckled top tube.  Needs to be collected in a plain tube

## 2023-02-08 LAB
CREATININE URINE: 169.9 MG/DL (ref 39–259)
MICROALBUMIN UR-MCNC: 82.5 MG/DL
MICROALBUMIN/CREAT UR-RTO: 485.6 MG/G (ref 0–30)
URINE CULTURE, ROUTINE: NORMAL

## 2023-02-09 ENCOUNTER — TELEMEDICINE (OUTPATIENT)
Dept: FAMILY MEDICINE CLINIC | Age: 68
End: 2023-02-09

## 2023-02-09 DIAGNOSIS — M31.6 TEMPORAL ARTERITIS (HCC): ICD-10-CM

## 2023-02-09 DIAGNOSIS — N18.32 STAGE 3B CHRONIC KIDNEY DISEASE (HCC): Primary | ICD-10-CM

## 2023-02-09 DIAGNOSIS — Z00.00 MEDICARE ANNUAL WELLNESS VISIT, SUBSEQUENT: ICD-10-CM

## 2023-02-09 RX ORDER — PREDNISONE 1 MG/1
4 TABLET ORAL DAILY
Qty: 360 TABLET | Refills: 1 | Status: SHIPPED | OUTPATIENT
Start: 2023-02-09 | End: 2023-08-08

## 2023-02-09 RX ORDER — FLUCONAZOLE 200 MG/1
200 TABLET ORAL DAILY
Qty: 14 TABLET | Refills: 0 | Status: SHIPPED | OUTPATIENT
Start: 2023-02-09 | End: 2023-02-23

## 2023-02-09 ASSESSMENT — PATIENT HEALTH QUESTIONNAIRE - PHQ9
SUM OF ALL RESPONSES TO PHQ QUESTIONS 1-9: 0
SUM OF ALL RESPONSES TO PHQ9 QUESTIONS 1 & 2: 0
1. LITTLE INTEREST OR PLEASURE IN DOING THINGS: 0
SUM OF ALL RESPONSES TO PHQ QUESTIONS 1-9: 0
2. FEELING DOWN, DEPRESSED OR HOPELESS: 0
SUM OF ALL RESPONSES TO PHQ QUESTIONS 1-9: 0
SUM OF ALL RESPONSES TO PHQ QUESTIONS 1-9: 0

## 2023-02-09 ASSESSMENT — LIFESTYLE VARIABLES
HOW MANY STANDARD DRINKS CONTAINING ALCOHOL DO YOU HAVE ON A TYPICAL DAY: 1 OR 2
HOW OFTEN DO YOU HAVE A DRINK CONTAINING ALCOHOL: MONTHLY OR LESS

## 2023-02-09 NOTE — PROGRESS NOTES
Medicare Annual Wellness Visit    Esteban Hodgkins. is here for Medicare AWV    Assessment & Plan   Stage 3b chronic kidney disease ([facility]  -     Basic Metabolic Panel; Future  Temporal arteritis ([facility]  -     External Referral To Ophthalmology  Medicare annual wellness visit, subsequent        His CKD worsened on recent BMP, creatinine up to 1.8 from 1.4, plan to recheck BMP in 1 week    For his temporal arteritis history, continued prednisone use after several years, referral to ophthalmology placed, he we will decrease his prednisone to 4 mg daily, I believe this will help his glucose control as well    Recommendations for Preventive Services Due: see orders and patient instructions/AVS.  Recommended screening schedule for the next 5-10 years is provided to the patient in written form: see Patient Instructions/AVS.     Follow-up in 6 months or sooner if needed       Subjective   Patient reports he is doing well, he has no concerns    Patient's complete Health Risk Assessment and screening values have been reviewed and are found in Flowsheets. The following problems were reviewed today and where indicated follow up appointments were made and/or referrals ordered.     Positive Risk Factor Screenings with Interventions:    Fall Risk:  Do you feel unsteady or are you worried about falling? : (!) yes (seeing PT)  2 or more falls in past year?: (!) yes  Fall with injury in past year?: (!) yes     Patient will be starting physical therapy              Weight and Activity:  Physical Activity: Sufficiently Active    Days of Exercise per Week: 7 days    Minutes of Exercise per Session: 30 min     On average, how many days per week do you engage in moderate to strenuous exercise (like a brisk walk)?: 7 days  Have you lost any weight without trying in the past 3 months?: No     Healthy living encouraged for weight management      Dentist Screen:  Have you seen the dentist within the past year?: (!) No  The importance of dentistry follow-up especially with his diabetes was emphasized         Vision Screen:  Do you have difficulty driving, watching TV, or doing any of your daily activities because of your eyesight?: (!) Yes (trouble seeing out of left eye)  Have you had an eye exam within the past year?: (!) No  No results found. Ophthalmology referral      Safety:  Do you have any tripping hazards - loose or unsecured carpets or rugs?: (!) Yes  Do you have either shower bars, grab bars, non-slip mats or non-slip surfaces in your shower or bathtub?: (!) No    The importance of removing fall hazards emphasized    He will consider getting Shingrix vaccines, and new COVID vaccine          Objective      Patient-Reported Vitals  No data recorded     General-no acute distress  Lungs-no increased work of breathing appreciated  Psych-euthymic         No Known Allergies  Prior to Visit Medications    Medication Sig Taking? Authorizing Provider   fluconazole (DIFLUCAN) 200 MG tablet Take 1 tablet by mouth daily for 14 days Yes Chelo Curry DO   predniSONE (DELTASONE) 1 MG tablet Take 4 tablets by mouth daily Dose reduced Yes Chelo Curry DO   amLODIPine (NORVASC) 10 MG tablet TAKE ONE TABLET BY MOUTH DAILY Yes Chelo Curry DO   ferrous sulfate (FE TABS 325) 325 (65 Fe) MG EC tablet TAKE ONE TABLET BY MOUTH TWICE A DAY IN THE MORNING AND AT BEDTIME Yes Chelo Curry DO   glipiZIDE (GLUCOTROL) 10 MG tablet TAKE TWO TABLETS BY MOUTH TWICE A DAY Yes Kinga Curry DO   Easy Touch Lancets 33G/Twist MISC USE TO TEST THREE TIMES A DAY Yes Markel Tobar MD   metoprolol succinate (TOPROL XL) 25 MG extended release tablet Take 1 tablet by mouth in the morning.  Yes Gabriel Curry DO   lovastatin (MEVACOR) 40 MG tablet TAKE ONE TABLET BY MOUTH ONCE NIGHTLY Yes Gabriel Curry DO   Cholecalciferol (VITAMIN D3) 50 MCG (2000 UT) CAPS Take 1 capsule by mouth daily Yes Love Valladares, DO hydrALAZINE (APRESOLINE) 25 MG tablet TAKE ONE TABLET BY MOUTH THREE TIMES A DAY Yes SERGIO Umanzor CNP   pioglitazone (ACTOS) 45 MG tablet Take 1 tablet by mouth daily Dose increased Yes Cleo Curry DO   blood glucose test strips ([employer] GLUCOSE TEST) strip Test twice daily Yes Irene Perry MD   omeprazole (PRILOSEC) 20 MG delayed release capsule Take 1 capsule by mouth Daily Yes Cleo Curry DO   losartan (COZAAR) 50 MG tablet Take 1 tablet by mouth daily Yes Cleo Curry DO   tamsulosin (FLOMAX) 0.4 MG capsule TAKE ONE CAPSULE BY MOUTH DAILY Yes Cleo Curry DO   metFORMIN (GLUCOPHAGE) 1000 MG tablet Take 1 tablet by mouth 2 times daily (with meals) Yes Cleo Curry DO   Blood Glucose Monitoring Suppl (ONE TOUCH ULTRA 2) w/Device KIT 1 kit by Does not apply route daily Yes Chito Johnson MD   Ascorbic Acid (VITAMIN C) 1000 MG tablet Take 1 tablet by mouth daily Yes Chito Johnson MD   Omega-3 Fatty Acids (FISH OIL) 1000 MG CAPS Take 1 capsule by mouth daily Yes Chito Johnson MD   Multiple Vitamins-Minerals (THERAPEUTIC MULTIVITAMIN-MINERALS) tablet Take 1 tablet by mouth daily Yes Historical Provider, MD   lovastatin (MEVACOR) 40 MG tablet Take 1 tablet by mouth nightly  SERGIO Umanzor CNP   Cholecalciferol (VITAMIN D3) 50 MCG (2000 UT) CAPS TAKE ONE CAPSULE BY MOUTH DAILY  Irene Perry MD   sitaGLIPtan-metformin (JANUMET)  MG per tablet Take 1 tablet by mouth 2 times daily (with meals)  Chito Johnson MD       CareTeam (Including outside providers/suppliers regularly involved in providing care):   Patient Care Team:  Romie Osgood, DO as PCP - General (Family Medicine)  Romie Osgood, DO as PCP - Empaneled Provider  Birgit Reina MD as Consulting Physician (Urology)     Reviewed and updated this visit:  Allergies  Meds          Jerri Alva., was evaluated through a synchronous (real-time) audio-video encounter. The patient (or guardian if applicable) is aware that this is a billable service, which includes applicable co-pays. This Virtual Visit was conducted with patient's (and/or legal guardian's) consent. The visit was conducted pursuant to the emergency declaration under the [facility], [address] [place] [address] authority and the [employer] and [employer] Act. Patient identification was verified, and a caregiver was present when appropriate.    The patient was located at Home: [place] AYLE[place] Quintanilla  Provider was located at [facility] (Appt Dept): [address]

## 2023-02-16 ENCOUNTER — NURSE ONLY (OUTPATIENT)
Dept: FAMILY MEDICINE CLINIC | Age: 68
End: 2023-02-16

## 2023-02-16 ENCOUNTER — OFFICE VISIT (OUTPATIENT)
Dept: SURGERY | Age: 68
End: 2023-02-16

## 2023-02-16 VITALS — BODY MASS INDEX: 32.11 KG/M2 | WEIGHT: 205 LBS

## 2023-02-16 DIAGNOSIS — N18.32 STAGE 3B CHRONIC KIDNEY DISEASE (HCC): ICD-10-CM

## 2023-02-16 DIAGNOSIS — K42.9 UMBILICAL HERNIA WITHOUT OBSTRUCTION AND WITHOUT GANGRENE: Primary | ICD-10-CM

## 2023-02-16 DIAGNOSIS — R10.32 LLQ PAIN: ICD-10-CM

## 2023-02-16 LAB
ANION GAP SERPL CALCULATED.3IONS-SCNC: 15 MMOL/L (ref 3–16)
BUN BLDV-MCNC: 33 MG/DL (ref 7–20)
CALCIUM SERPL-MCNC: 10.5 MG/DL (ref 8.3–10.6)
CHLORIDE BLD-SCNC: 99 MMOL/L (ref 99–110)
CO2: 24 MMOL/L (ref 21–32)
CREAT SERPL-MCNC: 1.8 MG/DL (ref 0.8–1.3)
GFR SERPL CREATININE-BSD FRML MDRD: 40 ML/MIN/{1.73_M2}
GLUCOSE BLD-MCNC: 171 MG/DL (ref 70–99)
POTASSIUM SERPL-SCNC: 4.6 MMOL/L (ref 3.5–5.1)
SODIUM BLD-SCNC: 138 MMOL/L (ref 136–145)

## 2023-02-16 ASSESSMENT — ENCOUNTER SYMPTOMS
DIARRHEA: 0
CONSTIPATION: 0
BLOOD IN STOOL: 0
ABDOMINAL PAIN: 1

## 2023-02-16 NOTE — PROGRESS NOTES
Labs drawn via Left Arm using 22 G x 1 1/2\" drawing needle on the first attempt with difficulty. Manual pressure applied to site upon completion of venipuncture without any bleeding, Band-aid applied over site.     Patient tolerated lab procedure well: Yes    Tubes used:  1 SST

## 2023-02-16 NOTE — Clinical Note
Thanks Irais Haas!   Planning CT to eval LLQ pain prior to hernia repair [facility]

## 2023-02-16 NOTE — PROGRESS NOTES
Subjective:      Patient ID: Porsha Sorenson. is a 69 y.o. male. HPI  Chief Complaint: hernia  Patient referred by Dr. Selma Montano for evaluation of hernia. Patient reports symptoms of pain, pressure. Location of symptoms is umbilicus and LLQ. Symptoms were first noted \"years ago\". Alleviated by lying down. Symptoms aggravated by eating. Previous evaluation includes exam by PCP. Reports regular BMs. Last colonoscopy 2021 with diverticulosis. Patient has a history of DM, HTN, HLP. Will plan following treatment: CT to eval LLQ pain, If normal then plan umbilical hernia repair.         Past Medical History:   Diagnosis Date    Anemia     Arthritis     Diabetes mellitus ([address]     Enlarged prostate     High blood pressure     Hyperlipidemia     Reflux esophagitis     Risk for falls     Tachycardia     Temporal arteritis ([facility] [address]     Urinary stone 06/18/2019    stent placed 6/18/2019       Past Surgical History:   Procedure Laterality Date    APPENDECTOMY  01/01/1984    COLONOSCOPY  08/27/2021    CYSTOSCOPY Left 6/18/2019    CYSTOSCOPY LEFT URETERAL STENT INSERTION performed by Judith Berg MD at [address] [facility] [address] Right 6/28/2022    RIGHT PAROTIDECTOMY performed by Isabel Alexander MD at [facility]       Current Outpatient Medications   Medication Sig Dispense Refill    fluconazole (DIFLUCAN) 200 MG tablet Take 1 tablet by mouth daily for 14 days 14 tablet 0    predniSONE (DELTASONE) 1 MG tablet Take 4 tablets by mouth daily Dose reduced 360 tablet 1    amLODIPine (NORVASC) 10 MG tablet TAKE ONE TABLET BY MOUTH DAILY 90 tablet 2    ferrous sulfate (FE TABS 325) 325 (65 Fe) MG EC tablet TAKE ONE TABLET BY MOUTH TWICE A DAY IN THE MORNING AND AT BEDTIME 180 tablet 1    glipiZIDE (GLUCOTROL) 10 MG tablet TAKE TWO TABLETS BY MOUTH TWICE A  tablet 1    Easy Touch Lancets 33G/Twist MISC USE TO TEST THREE TIMES A  each 5    metoprolol succinate (TOPROL XL) 25 MG extended release tablet Take 1 tablet by mouth in the morning. 90 tablet 3    lovastatin (MEVACOR) 40 MG tablet TAKE ONE TABLET BY MOUTH ONCE NIGHTLY 90 tablet 2    Cholecalciferol (VITAMIN D3) 50 MCG (2000 UT) CAPS Take 1 capsule by mouth daily 90 capsule 2    hydrALAZINE (APRESOLINE) 25 MG tablet TAKE ONE TABLET BY MOUTH THREE TIMES A  tablet 3    pioglitazone (ACTOS) 45 MG tablet Take 1 tablet by mouth daily Dose increased 90 tablet 3    blood glucose test strips ([employer] GLUCOSE TEST) strip Test twice daily 100 strip 5    omeprazole (PRILOSEC) 20 MG delayed release capsule Take 1 capsule by mouth Daily 90 capsule 2    losartan (COZAAR) 50 MG tablet Take 1 tablet by mouth daily 90 tablet 3    tamsulosin (FLOMAX) 0.4 MG capsule TAKE ONE CAPSULE BY MOUTH DAILY 90 capsule 3    metFORMIN (GLUCOPHAGE) 1000 MG tablet Take 1 tablet by mouth 2 times daily (with meals) 180 tablet 2    Blood Glucose Monitoring Suppl (ONE TOUCH ULTRA 2) w/Device KIT 1 kit by Does not apply route daily 1 kit 0    Ascorbic Acid (VITAMIN C) 1000 MG tablet Take 1 tablet by mouth daily 90 tablet 3    Omega-3 Fatty Acids (FISH OIL) 1000 MG CAPS Take 1 capsule by mouth daily 90 capsule 3    Multiple Vitamins-Minerals (THERAPEUTIC MULTIVITAMIN-MINERALS) tablet Take 1 tablet by mouth daily       No current facility-administered medications for this visit. Prior to Admission medications    Medication Sig Start Date End Date Taking?  Authorizing Provider   fluconazole (DIFLUCAN) 200 MG tablet Take 1 tablet by mouth daily for 14 days 2/9/23 2/23/23 Yes Keron Curry DO   predniSONE (DELTASONE) 1 MG tablet Take 4 tablets by mouth daily Dose reduced 2/9/23 8/8/23 Yes Kinga Curry DO   amLODIPine (NORVASC) 10 MG tablet TAKE ONE TABLET BY MOUTH DAILY 12/2/22  Yes Kassie Curry DO   ferrous sulfate (FE TABS 325) 325 (65 Fe) MG EC tablet TAKE ONE TABLET BY MOUTH TWICE A DAY IN THE MORNING AND AT BEDTIME 10/24/22  Yes Kassie Curry DO glipiZIDE (GLUCOTROL) 10 MG tablet TAKE TWO TABLETS BY MOUTH TWICE A DAY 10/6/22  Yes Selina Curyr DO   Easy Touch Lancets 33G/Twist MISC USE TO TEST THREE TIMES A DAY 9/21/22  Yes Siomara Mccurdy MD   metoprolol succinate (TOPROL XL) 25 MG extended release tablet Take 1 tablet by mouth in the morning.  7/19/22  Yes Renato Curry DO   lovastatin (MEVACOR) 40 MG tablet TAKE ONE TABLET BY MOUTH ONCE NIGHTLY 7/12/22  Yes Selina Curry DO   Cholecalciferol (VITAMIN D3) 50 MCG (2000 UT) CAPS Take 1 capsule by mouth daily 7/12/22  Yes Kinga Curry DO   hydrALAZINE (APRESOLINE) 25 MG tablet TAKE ONE TABLET BY MOUTH THREE TIMES A DAY 6/8/22  Yes SERGIO Roa CNP   pioglitazone (ACTOS) 45 MG tablet Take 1 tablet by mouth daily Dose increased 5/27/22  Yes Kinga Curry DO   blood glucose test strips ([employer] GLUCOSE TEST) strip Test twice daily 4/20/22  Yes Siomara Mccurdy MD   omeprazole (PRILOSEC) 20 MG delayed release capsule Take 1 capsule by mouth Daily 3/28/22  Yes Selina Curry DO   losartan (COZAAR) 50 MG tablet Take 1 tablet by mouth daily 3/18/22  Yes Renato Curry DO   tamsulosin (FLOMAX) 0.4 MG capsule TAKE ONE CAPSULE BY MOUTH DAILY 3/14/22  Yes Selina Curry DO   metFORMIN (GLUCOPHAGE) 1000 MG tablet Take 1 tablet by mouth 2 times daily (with meals) 2/15/22  Yes Renato Curry DO   Blood Glucose Monitoring Suppl (ONE TOUCH ULTRA 2) w/Device KIT 1 kit by Does not apply route daily 4/28/20  Yes Yared Landrum MD   Ascorbic Acid (VITAMIN C) 1000 MG tablet Take 1 tablet by mouth daily 11/7/19  Yes Yared Landrum MD   Omega-3 Fatty Acids (FISH OIL) 1000 MG CAPS Take 1 capsule by mouth daily 11/7/19  Yes Yared Landrum MD   Multiple Vitamins-Minerals (THERAPEUTIC MULTIVITAMIN-MINERALS) tablet Take 1 tablet by mouth daily   Yes Historical Provider, MD   lovastatin (MEVACOR) 40 MG tablet Take 1 tablet by mouth nightly 21   SERGIO Doe - CNP   Cholecalciferol (VITAMIN D3) 50 MCG ( UT) CAPS TAKE ONE CAPSULE BY MOUTH DAILY 20   Karen Rice MD   sitaGLIPtan-metformin (JANUMET)  MG per tablet Take 1 tablet by mouth 2 times daily (with meals) 2/3/20   Marika Neely MD         No Known Allergies    Social History     Socioeconomic History    Marital status:      Spouse name: Not on file    Number of children: Not on file    Years of education: Not on file    Highest education level: Not on file   Occupational History    Occupation: retired   Tobacco Use    Smoking status: Never    Smokeless tobacco: Never   Vaping Use    Vaping Use: Never used   Substance and Sexual Activity    Alcohol use: No    Drug use: Never    Sexual activity: Not Currently   Other Topics Concern    Not on file   Social History Narrative    , has kids, retired from [email or web address], likes to watch tv, model trains     Social Determinants of Health     Financial Resource Strain: Low Risk     Difficulty of Paying Living Expenses: Not hard at [address]: No Food Insecurity    Worried About [address] [facility] in the Last Year: Never true    [address] in the Last Year: Never true   Transportation Needs: Unknown    Lack of Transportation (Medical): Not on file    Lack of Transportation (Non-Medical):  No   Physical Activity: Sufficiently Active    Days of Exercise per Week: 7 days    Minutes of Exercise per Session: 30 min   Stress: Not on file   Social Connections: Not on file   Intimate Partner Violence: Not on file   Housing Stability: Unknown    Unable to Pay for Housing in the Last Year: Not on file    Number of Places Lived in the Last Year: Not on file    Unstable Housing in the Last Year: No       Family History   Problem Relation Age of Onset    Kidney Disease Mother          from    Diabetes Mother     No Known Problems Father     Cancer Brother         colon       Review of Systems   Constitutional: Negative. Gastrointestinal:  Positive for abdominal pain. Negative for blood in stool, constipation and diarrhea. All other systems reviewed and are negative. Objective:   Physical Exam  Vitals reviewed. Constitutional:       General: He is not in acute distress. Appearance: Normal appearance. He is well-developed. He is not diaphoretic. HENT:      Head: Normocephalic and atraumatic. Right Ear: External ear normal.      Left Ear: External ear normal.   Eyes:      Conjunctiva/sclera: Conjunctivae normal.      Pupils: Pupils are equal, round, and reactive to light. Neck:      Trachea: Trachea normal.   Cardiovascular:      Rate and Rhythm: Normal rate and regular rhythm. Heart sounds: Normal heart sounds, S1 normal and S2 normal.   Pulmonary:      Effort: Pulmonary effort is normal. No respiratory distress. Breath sounds: Normal breath sounds. No wheezing. Abdominal:      General: There is no distension. Palpations: Abdomen is soft. Tenderness: There is no abdominal tenderness. Hernia: A hernia (reducible umbilical) is present. Musculoskeletal:         General: Normal range of motion. Cervical back: Normal range of motion and neck supple. Skin:     General: Skin is warm and dry. Findings: No erythema. Neurological:      Mental Status: He is alert and oriented to person, place, and time. Psychiatric:         Behavior: Behavior normal. Behavior is cooperative. Thought Content: Thought content normal.         Judgment: Judgment normal.       Assessment:       Diagnosis Orders   1. Umbilical hernia without obstruction and without gangrene        2. LLQ pain  CT ABDOMEN PELVIS WO CONTRAST Additional Contrast? Oral              Plan:      Unclear etiology of LLQ pain as very vague and only worse with eating.   Will plan CT abd/pelvis to further eval prior to hernia repair  Recommend umbilical hernia repair if CT unremarkable  The risks, benefits and alternatives to the planned procedure were discussed. Patient expressed an understanding and is willing to proceed.           Jean Beltran MD

## 2023-02-17 ENCOUNTER — TELEPHONE (OUTPATIENT)
Dept: CARDIOLOGY CLINIC | Age: 68
End: 2023-02-17

## 2023-02-17 ENCOUNTER — OFFICE VISIT (OUTPATIENT)
Dept: CARDIOLOGY CLINIC | Age: 68
End: 2023-02-17
Payer: MEDICARE

## 2023-02-17 VITALS
BODY MASS INDEX: 31.71 KG/M2 | OXYGEN SATURATION: 97 % | DIASTOLIC BLOOD PRESSURE: 72 MMHG | WEIGHT: 202 LBS | SYSTOLIC BLOOD PRESSURE: 116 MMHG | HEART RATE: 99 BPM | HEIGHT: 67 IN

## 2023-02-17 DIAGNOSIS — E11.9 TYPE 2 DIABETES MELLITUS WITHOUT COMPLICATION, WITHOUT LONG-TERM CURRENT USE OF INSULIN (HCC): ICD-10-CM

## 2023-02-17 DIAGNOSIS — I10 ESSENTIAL HYPERTENSION: ICD-10-CM

## 2023-02-17 DIAGNOSIS — I20.0 UNSTABLE ANGINA (HCC): Primary | ICD-10-CM

## 2023-02-17 PROCEDURE — 3017F COLORECTAL CA SCREEN DOC REV: CPT | Performed by: INTERNAL MEDICINE

## 2023-02-17 PROCEDURE — G8427 DOCREV CUR MEDS BY ELIG CLIN: HCPCS | Performed by: INTERNAL MEDICINE

## 2023-02-17 PROCEDURE — 2022F DILAT RTA XM EVC RTNOPTHY: CPT | Performed by: INTERNAL MEDICINE

## 2023-02-17 PROCEDURE — 3080F DIAST BP >= 90 MM HG: CPT | Performed by: INTERNAL MEDICINE

## 2023-02-17 PROCEDURE — 1123F ACP DISCUSS/DSCN MKR DOCD: CPT | Performed by: INTERNAL MEDICINE

## 2023-02-17 PROCEDURE — 93000 ELECTROCARDIOGRAM COMPLETE: CPT | Performed by: INTERNAL MEDICINE

## 2023-02-17 PROCEDURE — 1036F TOBACCO NON-USER: CPT | Performed by: INTERNAL MEDICINE

## 2023-02-17 PROCEDURE — G8484 FLU IMMUNIZE NO ADMIN: HCPCS | Performed by: INTERNAL MEDICINE

## 2023-02-17 PROCEDURE — G8417 CALC BMI ABV UP PARAM F/U: HCPCS | Performed by: INTERNAL MEDICINE

## 2023-02-17 PROCEDURE — 99215 OFFICE O/P EST HI 40 MIN: CPT | Performed by: INTERNAL MEDICINE

## 2023-02-17 PROCEDURE — 3052F HG A1C>EQUAL 8.0%<EQUAL 9.0%: CPT | Performed by: INTERNAL MEDICINE

## 2023-02-17 PROCEDURE — 3077F SYST BP >= 140 MM HG: CPT | Performed by: INTERNAL MEDICINE

## 2023-02-17 NOTE — TELEPHONE ENCOUNTER
Please call to facilitate scheduling of a left heart catheretization with Dr. Chris Nava next week at [facility]. Thank you. The morning of your procedure you will park in the hospital parking lot and report directly to the cath lab to check in.     Pre-Procedure Instructions   You will need to fast for at least 8 hours prior to procedure. No caffeine the morning of. Hold all diabetic medications including, Metfomin. If you take Lantus/Levemir only take ½ your normal dose the evening before. All other medications can be taken in the morning with sips of water. You will need to take 325 mg aspirin the morning of. If you are currently taking 81 mg please take 4 tablets that morning. Do not use any lotions, creams or perfume the morning of procedure. Pre-procedure lab work will need to be completed 5-7 days prior to procedure. Please have a responsible adult to drive you home after procedure. We advise you have someone to stay with you for 24 hours following procedure for precautionary measures. Depending on procedure you may require an overnight stay. Cath lab will provide you with all post procedure instructions. If you have any questions regarding the procedure itself or medications, please call [phone] and ask to speak with a nurse.

## 2023-02-17 NOTE — PLAN OF CARE
[facility]. [address]  Phone: [phone]   Fax:     [phone]                                                     Physical Therapy Certification    Dear Luz Marina White, *,    We had the pleasure of evaluating the following patient for physical therapy services at [facility]. A summary of our findings can be found in the initial assessment below. This includes our plan of care. If you have any questions or concerns regarding these findings, please do not hesitate to contact me at the office phone number checked above. Thank you for the referral.       Physician Signature:_______________________________Date:__________________  By signing above (or electronic signature), therapists plan is approved by physician      Patient: Nerissa Martines. : 1955   MRN: [id number]  Referring Physician: Luz Marina White, *      Evaluation Date: 2023      Medical Diagnosis Information:  Medical Diagnosis: History of falling [Z91.81]  Other malaise [R53.81]   Treatment Diagnosis: decreased ability to ambulate and function                                     Insurance information: PT Insurance Information: medicare      Precautions/ Contra-indications: ***  Latex Allergy:  [x]NO      []YES  Preferred Language for Healthcare:   [x]English       []other:    C-SSRS Triggered by Intake questionnaire (Past 2 wk assessment ):   [x] No, Questionnaire did not trigger screening.   [] Yes, Patient intake triggered C-SSRS Screening      [] C-SSRS Screening completed  [] PCP notified via Epic     SUBJECTIVE: Patient is a 70 y/o male with various health issues and an increased hx of falls.      Relevant Medical History:Additional Pertinent Hx: htn, dm2, arthritis, anemia, tcardia, kidney disease  Functional Outcome Measure: WOMAC  = ***    Pain Scale: ***/10  Easing factors: ***  Provocative factors: ***     Type: []Constant   []Intermittent  []Radiating []Localized []other:     Numbness/Tingling: ***    Occupation/School:***    Living Status/Prior Level of Function: Independent with ADLs and IADLs, ***    OBJECTIVE:     Posture: ***    Functional Mobility/Transfers: ***    Palpation: ***    Bandages/Dressings/Incisions: ***    Gait: (include devices/WB status) ***    ROM LEFT RIGHT   HIP Flex     HIP Abd     HIP Ext     HIP IR     HIP ER     Knee ext     Knee Flex     Ankle PF     Ankle DF     Ankle In     Ankle Ev     Strength  LEFT RIGHT   HIP Flexors     HIP Abductors     HIP Ext     Hip ER     Knee EXT (quad)     Knee Flex (HS)     Ankle DF     Ankle PF     Ankle Inv     Ankle EV          Circumference  Mid apex  7 cm prox             Reflexes/Sensation:    [x]Dermatomes/Myotomes intact    [x]Reflexes equal and normal bilaterally   []Other:    Joint mobility: ***   []Normal    [x]Hypo   []Hyper    Orthopedic Special Tests: ***                       [x] Patient history, allergies, meds reviewed. Medical chart reviewed. See intake form. Review Of Systems (ROS):  [x]Performed Review of systems (Integumentary, CardioPulmonary, Neurological) by intake and observation. Intake form has been scanned into medical record. Patient has been instructed to contact their primary care physician regarding ROS issues if not already being addressed at this time.       Co-morbidities/Complexities (which will affect course of rehabilitation): ***  []None           Arthritic conditions   []Rheumatoid arthritis (M05.9)  []Osteoarthritis (M19.91)   Cardiovascular conditions   [x]Hypertension (I10)  [x]Hyperlipidemia (E78.5)  []Angina pectoris (I20)  []Atherosclerosis (I70)  []CVA Musculoskeletal conditions   []Disc pathology   []Congenital spine pathologies   []Prior surgical intervention  []Osteoporosis (M81.8)  []Osteopenia (M85.8)   Endocrine conditions []Hypothyroid (E03.9)  []Hyperthyroid Gastrointestinal conditions   []Constipation (G44.73)   Metabolic conditions   []Morbid obesity (E66.01)  [x]Diabetes type 1(E10.65) or 2 (E11.65)   []Neuropathy (G60.9)     Pulmonary conditions   []Asthma (J45)  []Coughing   []COPD (J44.9)   Psychological Disorders  []Anxiety (F41.9)  []Depression (F32.9)   []Other:   [x]Other:   anemia, tachycardia, arthritis       Barriers to/and or personal factors that will affect rehab potential:              [x]Age  []Sex    []Smoker              []Motivation/Lack of Motivation                        [x]Co-Morbidities              []Cognitive Function, education/learning barriers              []Environmental, home barriers              []profession/work barriers  [x]past PT/medical experience  []other:  Justification:     Falls Risk Assessment (30 days):   [x] Falls Risk assessed and no intervention required.   [] Falls Risk assessed and Patient requires intervention due to being higher risk   TUG score (>12s at risk):     [] Falls education provided, including         ASSESSMENT:   Functional Impairments:     [x]Noted lumbar/proximal hip/LE hypomobility   [x]Decreased LE functional ROM   [x]Decreased core/proximal hip strength and neuromuscular control   []Decreased LE functional strength   [x]Reduced balance/proprioceptive control   []other:      Functional Activity Limitations (from functional questionnaire and intake)   [x]Reduced ability to tolerate prolonged functional positions   [x]Reduced ability or difficulty with changes of positions or transfers between positions   [x]Reduced ability to maintain good posture and demonstrate good body mechanics with sitting, bending, and lifting   []Reduced ability to sleep   [x] Reduced ability or tolerance with driving and/or computer work   [x]Reduced ability to perform lifting, carrying tasks   [x]Reduced ability to squat   [x]Reduced ability to forward bend   [x]Reduced ability to ambulate prolonged functional periods/distances/surfaces   [x]Reduced ability to ascend/descend stairs   [x]Reduced ability to run, hop or jump   []other:     Participation Restrictions   [x]Reduced participation in self care activities   [x]Reduced participation in home management activities   [x]Reduced participation in work activities   [x]Reduced participation in social activities. [x]Reduced participation in sport activities. Classification :    []Signs/symptoms consistent with post-surgical status including decreased ROM, strength and function.    []Signs/symptoms consistent with joint sprain/strain   []Signs/symptoms consistent with patella-femoral syndrome   []Signs/symptoms consistent with knee OA/hip OA   []Signs/symptoms consistent with internal derangement of knee/Hip   []Signs/symptoms consistent with functional hip weakness/NMR control      []Signs/symptoms consistent with tendinitis/tendinosis    []signs/symptoms consistent with pathology which may benefit from Dry needling      [x]other:  frequent falls, le wweakness    Prognosis/Rehab Potential:      []Excellent   [x]Good    [x]Fair   []Poor    Tolerance of evaluation/treatment:    []Excellent   [x]Good    [x]Fair   []Poor    Physical Therapy Evaluation Complexity Justification  [x] A history of present problem with:  [] no personal factors and/or comorbidities that impact the plan of care;  [x]1-2 personal factors and/or comorbidities that impact the plan of care  []3 personal factors and/or comorbidities that impact the plan of care  [x] An examination of body systems using standardized tests and measures addressing any of the following: body structures and functions (impairments), activity limitations, and/or participation restrictions;:  [] a total of 1-2 or more elements   [] a total of 3 or more elements   [x] a total of 4 or more elements   [x] A clinical presentation with:  [] stable and/or uncomplicated characteristics   [x] evolving clinical presentation with changing characteristics  [] unstable and unpredictable characteristics;   [x] Clinical decision making of [x] low, [] moderate, [] high complexity using standardized patient assessment instrument and/or measurable assessment of functional outcome. [x] EVAL (LOW) 79483 (typically 20 minutes face-to-face)  [] EVAL (MOD) 63383 (typically 30 minutes face-to-face)  [] EVAL (HIGH) 79169 (typically 45 minutes face-to-face)  [] RE-EVAL     PLAN:  Frequency/Duration:  2 days per week for 12 Weeks:  Interventions:  [x]  Therapeutic exercise including: strength training, ROM, for Lower extremity and core   [x]  NMR activation and proprioception for LE, Glutes and Core   [x]  Manual therapy as indicated for LE, Hip and spine to include: Dry Needling/IASTM, STM, PROM, Gr I-IV mobilizations, manipulation. [x] Modalities as needed that may include: thermal agents, E-stim, Biofeedback, US, iontophoresis as indicated  [x] Patient education on joint protection, postural re-education, activity modification, progression of HEP. HEP instruction: (see scanned forms)    GOALS:  Patient stated goal: \" I want to walk and function better \"  [] Progressing: [] Met: [] Not Met: [] Adjusted    Therapist goals for Patient:   Short Term Goals: To be achieved in: 2 weeks  1. Independent in HEP and progression per patient tolerance, in order to prevent re-injury. [] Progressing: [] Met: [] Not Met: [] Adjusted  2. Patient will have a decrease in pain to facilitate improvement in movement, function, and ADLs as indicated by Functional Deficits. [] Progressing: [] Met: [] Not Met: [] Adjusted    Long Term Goals: To be achieved in: 12 weeks  1. Increase FOTO functional outcome score from *** to ***  to assist with reaching prior level of function. [] Progressing: [] Met: [] Not Met: [] Adjusted  2.  Patient will demonstrate increased AROM to 75% for le's to allow for proper joint functioning as indicated by patients Functional Deficits. [] Progressing: [] Met: [] Not Met: [] Adjusted  3. Patient will demonstrate an increase in Strength to good proximal hip strength and control, within 5lb HHD in LE to allow for proper functional mobility as indicated by patients Functional Deficits. [] Progressing: [] Met: [] Not Met: [] Adjusted  4. Patient will return to walking and adl's functional activities without increased symptoms or restriction. [] Progressing: [] Met: [] Not Met: [] Adjusted  5. Dynamic balance to 3+/5  [] Progressing: [] Met: [] Not Met: [] Adjusted     Electronically signed by:  Analia Chen PT      Note: If patient does not return for scheduled/recommended follow up visits, this note will serve as a discharge from care along with the most recent update on progress.

## 2023-02-17 NOTE — PROGRESS NOTES
[address] [facility] [employer] [employer]  1955    February 17, 2023    Reason for Consult: Chest pain     CC: \"I have chest pressure\"     HPI:  The patient is 69 y.o. male with a past medical history significant for hypertension, type 2 diabetes and possible prior TIA. He was told by his ophthalmologist that he has limited blood flow behind his left eye and this could be related to a prior TIA. He was previously referred by Dr. José Luis Graves regarding an abnormal EKG. His EKG showed sinus tachycardia and did not require a follow up. Today he is accompanied by his daughter. He reports recurrent chest \"pressure\" with exertion. The episodes resolve within 10 minutes and resolve with rest. The episodes are sometimes associated with arm pain. He reports occasional shortness of breath that are not always associated with these episodes. He denies any family cardiac history. He reports medication compliance and is tolerating. He denies any abnormal bleeding or bruising. He denies dyspnea at rest, PND, orthopnea, palpitations, lightheadedness, weight changes, changes in LE edema, and syncope. Review of Systems:  Constitutional: No fatigue, weakness, night sweats or fever. HEENT: No new vision difficulties or ringing in the ears. Respiratory: No new SOB, PND, orthopnea or cough. Cardiovascular: See HPI   GI: No n/v, diarrhea, constipation, abdominal pain or changes in bowel habits. No melena, no hematochezia  : No urinary frequency, urgency, incontinence, hematuria or dysuria. Skin: No cyanosis or skin lesions. Musculoskeletal: No new muscle or joint pain. Neurological: No syncope or TIA-like symptoms.   Psychiatric: No anxiety, insomnia or depression     Past Medical History:   Diagnosis Date    Anemia     Arthritis     Diabetes mellitus ([facility] [address]     Enlarged prostate     High blood pressure     Hyperlipidemia     Reflux esophagitis     Risk for falls     Tachycardia     Temporal arteritis ([facility] [address]     Urinary stone 2019    stent placed 2019     Past Surgical History:   Procedure Laterality Date    APPENDECTOMY  1984    COLONOSCOPY  2021    CYSTOSCOPY Left 2019    CYSTOSCOPY LEFT URETERAL STENT INSERTION performed by Ilene Diez MD at [address] [place] [address] Rd Right 2022    RIGHT PAROTIDECTOMY performed by Obdulia Ramos MD at [facility] History   Problem Relation Age of Onset    Kidney Disease Mother          from    Diabetes Mother     No Known Problems Father     Cancer Brother         colon     Social History     Tobacco Use    Smoking status: Never    Smokeless tobacco: Never   Vaping Use    Vaping Use: Never used   Substance Use Topics    Alcohol use: No    Drug use: Never       No Known Allergies  Current Outpatient Medications   Medication Sig Dispense Refill    predniSONE (DELTASONE) 1 MG tablet Take 4 tablets by mouth daily Dose reduced 360 tablet 1    amLODIPine (NORVASC) 10 MG tablet TAKE ONE TABLET BY MOUTH DAILY 90 tablet 2    ferrous sulfate (FE TABS 325) 325 (65 Fe) MG EC tablet TAKE ONE TABLET BY MOUTH TWICE A DAY IN THE MORNING AND AT BEDTIME 180 tablet 1    glipiZIDE (GLUCOTROL) 10 MG tablet TAKE TWO TABLETS BY MOUTH TWICE A  tablet 1    Easy Touch Lancets 33G/Twist MISC USE TO TEST THREE TIMES A  each 5    metoprolol succinate (TOPROL XL) 25 MG extended release tablet Take 1 tablet by mouth in the morning.  90 tablet 3    lovastatin (MEVACOR) 40 MG tablet TAKE ONE TABLET BY MOUTH ONCE NIGHTLY 90 tablet 2    Cholecalciferol (VITAMIN D3) 50 MCG ( UT) CAPS Take 1 capsule by mouth daily 90 capsule 2    hydrALAZINE (APRESOLINE) 25 MG tablet TAKE ONE TABLET BY MOUTH THREE TIMES A  tablet 3    pioglitazone (ACTOS) 45 MG tablet Take 1 tablet by mouth daily Dose increased 90 tablet 3    blood glucose test strips (CarlypsoPRO GLUCOSE TEST) strip Test twice daily 100 strip 5    omeprazole (PRILOSEC) 20 MG delayed release capsule Take 1 capsule by mouth Daily 90 capsule 2    losartan (COZAAR) 50 MG tablet Take 1 tablet by mouth daily 90 tablet 3    tamsulosin (FLOMAX) 0.4 MG capsule TAKE ONE CAPSULE BY MOUTH DAILY 90 capsule 3    metFORMIN (GLUCOPHAGE) 1000 MG tablet Take 1 tablet by mouth 2 times daily (with meals) 180 tablet 2    Blood Glucose Monitoring Suppl (ONE TOUCH ULTRA 2) w/Device KIT 1 kit by Does not apply route daily 1 kit 0    Ascorbic Acid (VITAMIN C) 1000 MG tablet Take 1 tablet by mouth daily 90 tablet 3    Omega-3 Fatty Acids (FISH OIL) 1000 MG CAPS Take 1 capsule by mouth daily 90 capsule 3    Multiple Vitamins-Minerals (THERAPEUTIC MULTIVITAMIN-MINERALS) tablet Take 1 tablet by mouth daily      fluconazole (DIFLUCAN) 200 MG tablet Take 1 tablet by mouth daily for 14 days (Patient not taking: Reported on 2/17/2023) 14 tablet 0     No current facility-administered medications for this visit. Physical Exam:   /72 (Site: Right Upper Arm, Position: Sitting)   Pulse 99   Ht 5' 7\" (1.702 m)   Wt 202 lb (91.6 kg)   SpO2 97%   BMI 31.64 kg/m²   No intake or output data in the 24 hours ending 02/17/23 1209  Wt Readings from Last 2 Encounters:   02/17/23 202 lb (91.6 kg)   02/16/23 205 lb (93 kg)     Constitutional: He is oriented to person, place, and time. He appears well-developed and well-nourished. In no acute distress. Head: Normocephalic and atraumatic. Neck: Neck supple. No JVD present. Carotid bruit is not present. No mass and no thyromegaly present. No lymphadenopathy present. Cardiovascular: Normal rate, regular rhythm, normal heart sounds and intact distal pulses. Exam reveals no gallop and no friction rub. No murmur heard. Pulmonary/Chest: Effort normal and breath sounds normal. No respiratory distress. He has no wheezes, rhonchi or rales. Abdominal: Soft, non-tender. Bowel sounds and aorta are normal. He exhibits no organomegaly, mass or bruit.    Extremities: No edema, cyanosis, or clubbing. Pulses are 2+ radial/carotid/dorsalis pedis and posterior tibial bilaterally. Neurological: He is alert and oriented to person, place, and time. He has normal reflexes. No cranial nerve deficit. Coordination normal.   Skin: Skin is warm and dry. There is no rash or diaphoresis. Psychiatric: He has a normal mood and affect. His speech is normal and behavior is normal.     EKG Interpretation 6/23/21: Sinus tachycardia  EKG Interpretation 2/17/23: Sinus tachycardia with possible prior inferior/anterior infarct    Lab Review:   Lab Results   Component Value Date/Time    TRIG 385 05/16/2022 11:20 AM    HDL 46 05/16/2022 11:20 AM    LDLCALC see below 05/16/2022 11:20 AM    LDLDIRECT 103 02/06/2023 10:22 AM    LABVLDL see below 05/16/2022 11:20 AM     Lab Results   Component Value Date/Time     02/16/2023 09:24 AM    K 4.6 02/16/2023 09:24 AM    K 3.8 06/25/2019 05:41 AM    BUN 33 02/16/2023 09:24 AM    CREATININE 1.8 02/16/2023 09:24 AM     Assessment:  1. Unstable angina   2. Essential hypertension   3. Type 2 diabetes, without insulin use or long term complications    Plan:  His symptoms represent unstable angina and I recommend he pursues a left heart catheretization next week. We discussed usually we would first pursue stress testing but I believe his symptoms warrant a sooner catheretization. I discussed the risks and benefits of cardiac catheterization with the patient. I also discussed the possible therapies and alternatives including medical management, angioplasty and stenting or coronary bypass surgery. The patient is amenable to undergoing the procedure and voices understanding of the risks. We will have the procedure scheduled. I have personally reviewed all previous testing for this visit today including imaging, lab results and EKG as detailed above. I will see him in office for follow up post procedure.      This note was scribed in the presence of Dr Veronica Alexis MD by Judith Hoang RN. Physician Attestation:  The scribes documentation has been prepared under my direction and personally reviewed by me in its entirety. I, Dr. Jannette Cook personally performed the services described in this documentation as scribed by my RN in my presence, and I confirm that the note above accurately reflects all work, treatment, procedures, and medical decision making performed by me.

## 2023-02-17 NOTE — TELEPHONE ENCOUNTER
Spoke with the patient the patient and got him scheduled for the procedure. We went over the instructions below and he verbalized understanding. Procedure - [facility]   Date: 2/21/2023  Arrival time: 9:30 am   Procedure time: 11:00 am     The morning of your procedure you will park in the hospital parking lot and report directly to the cath lab to check in.      Pre-Procedure Instructions   You will need to fast for at least 8 hours prior to procedure. No caffeine the morning of. Hold all diabetic medications including, Metfomin. If you take Lantus/Levemir only take ½ your normal dose the evening before. All other medications can be taken in the morning with sips of water. You will need to take 325 mg aspirin the morning of. If you are currently taking 81 mg please take 4 tablets that morning. Do not use any lotions, creams or perfume the morning of procedure. Pre-procedure lab work will need to be completed 5-7 days prior to procedure. Please have a responsible adult to drive you home after procedure. We advise you have someone to stay with you for 24 hours following procedure for precautionary measures. Depending on procedure you may require an overnight stay. Cath lab will provide you with all post procedure instructions. If you have any questions regarding the procedure itself or medications, please call [phone] and ask to speak with a nurse.      Grace Rayo updated / emailed cath lab

## 2023-02-20 ENCOUNTER — TELEPHONE (OUTPATIENT)
Dept: FAMILY MEDICINE CLINIC | Age: 68
End: 2023-02-20

## 2023-02-20 RX ORDER — ALOGLIPTIN 12.5 MG/1
12.5 TABLET, FILM COATED ORAL DAILY
Qty: 30 TABLET | Refills: 0 | Status: ON HOLD
Start: 2023-02-20 | End: 2023-02-22 | Stop reason: HOSPADM

## 2023-02-20 NOTE — TELEPHONE ENCOUNTER
Pt called stating that Dr. ABBASI put him on a new diabetic medication (He did not know the name of it) and was told if it is to expensive to let the doctor know. Pt states it is $140.00. So Pt is wanting something else to be called in.

## 2023-02-21 ENCOUNTER — HOSPITAL ENCOUNTER (OUTPATIENT)
Dept: PHYSICAL THERAPY | Age: 68
Setting detail: THERAPIES SERIES
Discharge: HOME | End: 2023-02-21

## 2023-02-21 ENCOUNTER — HOSPITAL ENCOUNTER (OUTPATIENT)
Dept: CARDIAC CATH/INVASIVE PROCEDURES | Age: 68
Discharge: HOME | End: 2023-02-22
Attending: INTERNAL MEDICINE | Admitting: INTERNAL MEDICINE
Payer: MEDICARE

## 2023-02-21 PROBLEM — I20.0 UNSTABLE ANGINA (HCC): Status: ACTIVE | Noted: 2023-02-21

## 2023-02-21 LAB
GLUCOSE BLD-MCNC: 238 MG/DL (ref 70–99)
PERFORMED ON: ABNORMAL
POC ACT LR: 254 SEC

## 2023-02-21 PROCEDURE — C1769 GUIDE WIRE: HCPCS

## 2023-02-21 PROCEDURE — 6360000002 HC RX W HCPCS: Performed by: INTERNAL MEDICINE

## 2023-02-21 PROCEDURE — 6370000000 HC RX 637 (ALT 250 FOR IP)

## 2023-02-21 PROCEDURE — 6370000000 HC RX 637 (ALT 250 FOR IP): Performed by: INTERNAL MEDICINE

## 2023-02-21 PROCEDURE — C9600 PERC DRUG-EL COR STENT SING: HCPCS

## 2023-02-21 PROCEDURE — C1894 INTRO/SHEATH, NON-LASER: HCPCS

## 2023-02-21 PROCEDURE — 2580000003 HC RX 258: Performed by: INTERNAL MEDICINE

## 2023-02-21 PROCEDURE — 93458 L HRT ARTERY/VENTRICLE ANGIO: CPT | Performed by: INTERNAL MEDICINE

## 2023-02-21 PROCEDURE — 2709999900 HC NON-CHARGEABLE SUPPLY

## 2023-02-21 PROCEDURE — 85347 COAGULATION TIME ACTIVATED: CPT

## 2023-02-21 PROCEDURE — 99153 MOD SED SAME PHYS/QHP EA: CPT

## 2023-02-21 PROCEDURE — C1725 CATH, TRANSLUMIN NON-LASER: HCPCS

## 2023-02-21 PROCEDURE — C1887 CATHETER, GUIDING: HCPCS

## 2023-02-21 PROCEDURE — 92928 PRQ TCAT PLMT NTRAC ST 1 LES: CPT | Performed by: INTERNAL MEDICINE

## 2023-02-21 PROCEDURE — 6360000002 HC RX W HCPCS

## 2023-02-21 PROCEDURE — 99152 MOD SED SAME PHYS/QHP 5/>YRS: CPT

## 2023-02-21 PROCEDURE — 2500000003 HC RX 250 WO HCPCS

## 2023-02-21 PROCEDURE — 92928 PRQ TCAT PLMT NTRAC ST 1 LES: CPT

## 2023-02-21 PROCEDURE — 6360000004 HC RX CONTRAST MEDICATION: Performed by: INTERNAL MEDICINE

## 2023-02-21 PROCEDURE — 99152 MOD SED SAME PHYS/QHP 5/>YRS: CPT | Performed by: INTERNAL MEDICINE

## 2023-02-21 PROCEDURE — C1874 STENT, COATED/COV W/DEL SYS: HCPCS

## 2023-02-21 PROCEDURE — 93458 L HRT ARTERY/VENTRICLE ANGIO: CPT

## 2023-02-21 RX ORDER — ONDANSETRON 2 MG/ML
4 INJECTION INTRAMUSCULAR; INTRAVENOUS EVERY 6 HOURS PRN
Status: DISCONTINUED | OUTPATIENT
Start: 2023-02-21 | End: 2023-02-22 | Stop reason: HOSPADM

## 2023-02-21 RX ORDER — SODIUM CHLORIDE 0.9 % (FLUSH) 0.9 %
5-40 SYRINGE (ML) INJECTION PRN
Status: DISCONTINUED | OUTPATIENT
Start: 2023-02-21 | End: 2023-02-22 | Stop reason: HOSPADM

## 2023-02-21 RX ORDER — FLUCONAZOLE 100 MG/1
200 TABLET ORAL DAILY
Status: DISCONTINUED | OUTPATIENT
Start: 2023-02-21 | End: 2023-02-21

## 2023-02-21 RX ORDER — ROSUVASTATIN CALCIUM 40 MG/1
40 TABLET, COATED ORAL NIGHTLY
Status: DISCONTINUED | OUTPATIENT
Start: 2023-02-21 | End: 2023-02-22 | Stop reason: HOSPADM

## 2023-02-21 RX ORDER — DEXTROSE MONOHYDRATE 100 MG/ML
INJECTION, SOLUTION INTRAVENOUS CONTINUOUS PRN
Status: DISCONTINUED | OUTPATIENT
Start: 2023-02-21 | End: 2023-02-22 | Stop reason: HOSPADM

## 2023-02-21 RX ORDER — VITAMIN B COMPLEX
2000 TABLET ORAL DAILY
Status: DISCONTINUED | OUTPATIENT
Start: 2023-02-22 | End: 2023-02-22 | Stop reason: HOSPADM

## 2023-02-21 RX ORDER — SODIUM CHLORIDE 0.9 % (FLUSH) 0.9 %
5-40 SYRINGE (ML) INJECTION EVERY 12 HOURS SCHEDULED
Status: DISCONTINUED | OUTPATIENT
Start: 2023-02-21 | End: 2023-02-22 | Stop reason: HOSPADM

## 2023-02-21 RX ORDER — PREDNISONE 1 MG/1
1 TABLET ORAL 2 TIMES DAILY
Status: DISCONTINUED | OUTPATIENT
Start: 2023-02-21 | End: 2023-02-22 | Stop reason: HOSPADM

## 2023-02-21 RX ORDER — FENTANYL CITRATE 50 UG/ML
25 INJECTION, SOLUTION INTRAMUSCULAR; INTRAVENOUS
Status: DISCONTINUED | OUTPATIENT
Start: 2023-02-21 | End: 2023-02-22 | Stop reason: HOSPADM

## 2023-02-21 RX ORDER — CHLORAL HYDRATE 500 MG
2 CAPSULE ORAL DAILY
Status: DISCONTINUED | OUTPATIENT
Start: 2023-02-21 | End: 2023-02-21

## 2023-02-21 RX ORDER — TAMSULOSIN HYDROCHLORIDE 0.4 MG/1
0.4 CAPSULE ORAL DAILY
Status: DISCONTINUED | OUTPATIENT
Start: 2023-02-21 | End: 2023-02-22 | Stop reason: HOSPADM

## 2023-02-21 RX ORDER — FERROUS SULFATE TAB EC 324 MG (65 MG FE EQUIVALENT) 324 (65 FE) MG
325 TABLET DELAYED RESPONSE ORAL
Status: DISCONTINUED | OUTPATIENT
Start: 2023-02-22 | End: 2023-02-22 | Stop reason: HOSPADM

## 2023-02-21 RX ORDER — PREDNISONE 1 MG/1
4 TABLET ORAL DAILY
Status: DISCONTINUED | OUTPATIENT
Start: 2023-02-21 | End: 2023-02-21

## 2023-02-21 RX ORDER — SODIUM CHLORIDE 9 MG/ML
INJECTION, SOLUTION INTRAVENOUS PRN
Status: DISCONTINUED | OUTPATIENT
Start: 2023-02-21 | End: 2023-02-22 | Stop reason: HOSPADM

## 2023-02-21 RX ORDER — HYDRALAZINE HYDROCHLORIDE 20 MG/ML
10 INJECTION INTRAMUSCULAR; INTRAVENOUS
Status: DISCONTINUED | OUTPATIENT
Start: 2023-02-21 | End: 2023-02-22 | Stop reason: HOSPADM

## 2023-02-21 RX ORDER — LOSARTAN POTASSIUM 50 MG/1
50 TABLET ORAL DAILY
Status: DISCONTINUED | OUTPATIENT
Start: 2023-02-21 | End: 2023-02-22 | Stop reason: HOSPADM

## 2023-02-21 RX ORDER — METOPROLOL SUCCINATE 25 MG/1
25 TABLET, EXTENDED RELEASE ORAL DAILY
Status: DISCONTINUED | OUTPATIENT
Start: 2023-02-21 | End: 2023-02-22 | Stop reason: HOSPADM

## 2023-02-21 RX ORDER — M-VIT,TX,IRON,MINS/CALC/FOLIC 27MG-0.4MG
1 TABLET ORAL DAILY
Status: DISCONTINUED | OUTPATIENT
Start: 2023-02-22 | End: 2023-02-22 | Stop reason: HOSPADM

## 2023-02-21 RX ORDER — 0.9 % SODIUM CHLORIDE 0.9 %
250 INTRAVENOUS SOLUTION INTRAVENOUS PRN
Status: DISCONTINUED | OUTPATIENT
Start: 2023-02-21 | End: 2023-02-22 | Stop reason: HOSPADM

## 2023-02-21 RX ORDER — PIOGLITAZONEHYDROCHLORIDE 15 MG/1
45 TABLET ORAL DAILY
Status: DISCONTINUED | OUTPATIENT
Start: 2023-02-21 | End: 2023-02-22 | Stop reason: HOSPADM

## 2023-02-21 RX ORDER — ACETAMINOPHEN 325 MG/1
650 TABLET ORAL EVERY 4 HOURS PRN
Status: DISCONTINUED | OUTPATIENT
Start: 2023-02-21 | End: 2023-02-22 | Stop reason: HOSPADM

## 2023-02-21 RX ORDER — ALOGLIPTIN 6.25 MG/1
12.5 TABLET, FILM COATED ORAL DAILY
Status: DISCONTINUED | OUTPATIENT
Start: 2023-02-21 | End: 2023-02-21

## 2023-02-21 RX ORDER — ASCORBIC ACID 500 MG
1000 TABLET ORAL DAILY
Status: DISCONTINUED | OUTPATIENT
Start: 2023-02-21 | End: 2023-02-21

## 2023-02-21 RX ORDER — PANTOPRAZOLE SODIUM 40 MG/1
40 TABLET, DELAYED RELEASE ORAL
Status: DISCONTINUED | OUTPATIENT
Start: 2023-02-21 | End: 2023-02-22 | Stop reason: HOSPADM

## 2023-02-21 RX ORDER — AMLODIPINE BESYLATE 10 MG/1
10 TABLET ORAL DAILY
Status: DISCONTINUED | OUTPATIENT
Start: 2023-02-21 | End: 2023-02-22 | Stop reason: HOSPADM

## 2023-02-21 RX ORDER — ENOXAPARIN SODIUM 100 MG/ML
40 INJECTION SUBCUTANEOUS EVERY 12 HOURS
Status: COMPLETED | OUTPATIENT
Start: 2023-02-22 | End: 2023-02-22

## 2023-02-21 RX ORDER — MORPHINE SULFATE 2 MG/ML
2 INJECTION, SOLUTION INTRAMUSCULAR; INTRAVENOUS
Status: ACTIVE | OUTPATIENT
Start: 2023-02-21 | End: 2023-02-22

## 2023-02-21 RX ORDER — ATROPINE SULFATE 0.4 MG/ML
0.5 AMPUL (ML) INJECTION
Status: DISPENSED | OUTPATIENT
Start: 2023-02-21 | End: 2023-02-22

## 2023-02-21 RX ORDER — GLIPIZIDE 5 MG/1
10 TABLET ORAL
Status: DISCONTINUED | OUTPATIENT
Start: 2023-02-22 | End: 2023-02-22 | Stop reason: HOSPADM

## 2023-02-21 RX ORDER — ASPIRIN 81 MG/1
81 TABLET, CHEWABLE ORAL DAILY
Status: DISCONTINUED | OUTPATIENT
Start: 2023-02-22 | End: 2023-02-22 | Stop reason: HOSPADM

## 2023-02-21 RX ADMIN — PREDNISONE 1 MG: 1 TABLET ORAL at 20:38

## 2023-02-21 RX ADMIN — PANTOPRAZOLE SODIUM 40 MG: 40 TABLET, DELAYED RELEASE ORAL at 14:16

## 2023-02-21 RX ADMIN — PIOGLITAZONE 45 MG: 15 TABLET ORAL at 14:16

## 2023-02-21 RX ADMIN — ROSUVASTATIN CALCIUM 40 MG: 40 TABLET, FILM COATED ORAL at 20:38

## 2023-02-21 RX ADMIN — Medication 10 ML: at 20:39

## 2023-02-21 RX ADMIN — TAMSULOSIN HYDROCHLORIDE 0.4 MG: 0.4 CAPSULE ORAL at 14:16

## 2023-02-21 RX ADMIN — METOPROLOL SUCCINATE 25 MG: 25 TABLET, FILM COATED, EXTENDED RELEASE ORAL at 14:16

## 2023-02-21 RX ADMIN — ENOXAPARIN SODIUM 40 MG: 100 INJECTION SUBCUTANEOUS at 23:25

## 2023-02-21 RX ADMIN — Medication 10 ML: at 14:19

## 2023-02-21 RX ADMIN — FENTANYL CITRATE 25 MCG: 50 INJECTION INTRAMUSCULAR; INTRAVENOUS at 12:48

## 2023-02-21 RX ADMIN — TICAGRELOR 90 MG: 90 TABLET ORAL at 20:38

## 2023-02-21 RX ADMIN — IOPAMIDOL 120 ML: 755 INJECTION, SOLUTION INTRAVENOUS at 12:55

## 2023-02-21 RX ADMIN — LOSARTAN POTASSIUM 50 MG: 50 TABLET, FILM COATED ORAL at 14:16

## 2023-02-21 RX ADMIN — AMLODIPINE BESYLATE 10 MG: 10 TABLET ORAL at 14:19

## 2023-02-21 RX ADMIN — Medication 10 ML: at 20:38

## 2023-02-21 RX ADMIN — FENTANYL CITRATE 25 MCG: 50 INJECTION INTRAMUSCULAR; INTRAVENOUS at 16:07

## 2023-02-21 ASSESSMENT — PAIN SCALES - GENERAL
PAINLEVEL_OUTOF10: 5
PAINLEVEL_OUTOF10: 7
PAINLEVEL_OUTOF10: 7

## 2023-02-21 ASSESSMENT — PAIN DESCRIPTION - LOCATION
LOCATION: CHEST
LOCATION: CHEST

## 2023-02-21 ASSESSMENT — PAIN DESCRIPTION - ORIENTATION
ORIENTATION: MID
ORIENTATION: MID

## 2023-02-21 ASSESSMENT — PAIN - FUNCTIONAL ASSESSMENT: PAIN_FUNCTIONAL_ASSESSMENT: ACTIVITIES ARE NOT PREVENTED

## 2023-02-21 ASSESSMENT — PAIN DESCRIPTION - DESCRIPTORS
DESCRIPTORS: ACHING
DESCRIPTORS: ACHING

## 2023-02-21 NOTE — H&P
CC: \"I have chest pressure\"     HPI:  The patient is 69 y.o. male with a past medical history significant for hypertension, type 2 diabetes and possible prior TIA. He was told by his ophthalmologist that he has limited blood flow behind his left eye and this could be related to a prior TIA. He was previously referred by Dr. Ha Schulz regarding an abnormal EKG. His EKG showed sinus tachycardia and did not require a follow up. Today he is accompanied by his daughter. He reports recurrent chest \"pressure\" with exertion. The episodes resolve within 10 minutes and resolve with rest. The episodes are sometimes associated with arm pain. He reports occasional shortness of breath that are not always associated with these episodes. He denies any family cardiac history. He reports medication compliance and is tolerating. He denies any abnormal bleeding or bruising. He denies dyspnea at rest, PND, orthopnea, palpitations, lightheadedness, weight changes, changes in LE edema, and syncope. Review of Systems:  Constitutional: No fatigue, weakness, night sweats or fever. HEENT: No new vision difficulties or ringing in the ears. Respiratory: No new SOB, PND, orthopnea or cough. Cardiovascular: See HPI   GI: No n/v, diarrhea, constipation, abdominal pain or changes in bowel habits. No melena, no hematochezia  : No urinary frequency, urgency, incontinence, hematuria or dysuria. Skin: No cyanosis or skin lesions. Musculoskeletal: No new muscle or joint pain. Neurological: No syncope or TIA-like symptoms.   Psychiatric: No anxiety, insomnia or depression     Past Medical History:   Diagnosis Date    Anemia     Arthritis     Diabetes mellitus ([address]     Enlarged prostate     High blood pressure     Hyperlipidemia     Reflux esophagitis     Risk for falls     Tachycardia     Temporal arteritis ([address]     Urinary stone 06/18/2019    stent placed 6/18/2019     Past Surgical History:   Procedure Laterality Date APPENDECTOMY  1984    COLONOSCOPY  2021    CYSTOSCOPY Left 2019    CYSTOSCOPY LEFT URETERAL STENT INSERTION performed by Lexi Blake MD at [address] [place] [address] Rd Right 2022    RIGHT PAROTIDECTOMY performed by Luc Jenkins MD at [facility] History   Problem Relation Age of Onset    Kidney Disease Mother          from    Diabetes Mother     No Known Problems Father     Cancer Brother         colon     Social History     Tobacco Use    Smoking status: Never    Smokeless tobacco: Never   Vaping Use    Vaping Use: Never used   Substance Use Topics    Alcohol use: No    Drug use: Never       No Known Allergies  Current Outpatient Medications   Medication Sig Dispense Refill    predniSONE (DELTASONE) 1 MG tablet Take 4 tablets by mouth daily Dose reduced 360 tablet 1    amLODIPine (NORVASC) 10 MG tablet TAKE ONE TABLET BY MOUTH DAILY 90 tablet 2    ferrous sulfate (FE TABS 325) 325 (65 Fe) MG EC tablet TAKE ONE TABLET BY MOUTH TWICE A DAY IN THE MORNING AND AT BEDTIME 180 tablet 1    glipiZIDE (GLUCOTROL) 10 MG tablet TAKE TWO TABLETS BY MOUTH TWICE A  tablet 1    Easy Touch Lancets 33G/Twist MISC USE TO TEST THREE TIMES A  each 5    metoprolol succinate (TOPROL XL) 25 MG extended release tablet Take 1 tablet by mouth in the morning.  90 tablet 3    lovastatin (MEVACOR) 40 MG tablet TAKE ONE TABLET BY MOUTH ONCE NIGHTLY 90 tablet 2    Cholecalciferol (VITAMIN D3) 50 MCG (2000 UT) CAPS Take 1 capsule by mouth daily 90 capsule 2    hydrALAZINE (APRESOLINE) 25 MG tablet TAKE ONE TABLET BY MOUTH THREE TIMES A  tablet 3    pioglitazone (ACTOS) 45 MG tablet Take 1 tablet by mouth daily Dose increased 90 tablet 3    blood glucose test strips ([employer] GLUCOSE TEST) strip Test twice daily 100 strip 5    omeprazole (PRILOSEC) 20 MG delayed release capsule Take 1 capsule by mouth Daily 90 capsule 2    losartan (COZAAR) 50 MG tablet Take 1 tablet by mouth daily 90 tablet 3    tamsulosin (FLOMAX) 0.4 MG capsule TAKE ONE CAPSULE BY MOUTH DAILY 90 capsule 3    metFORMIN (GLUCOPHAGE) 1000 MG tablet Take 1 tablet by mouth 2 times daily (with meals) 180 tablet 2    Blood Glucose Monitoring Suppl (ONE TOUCH ULTRA 2) w/Device KIT 1 kit by Does not apply route daily 1 kit 0    Ascorbic Acid (VITAMIN C) 1000 MG tablet Take 1 tablet by mouth daily 90 tablet 3    Omega-3 Fatty Acids (FISH OIL) 1000 MG CAPS Take 1 capsule by mouth daily 90 capsule 3    Multiple Vitamins-Minerals (THERAPEUTIC MULTIVITAMIN-MINERALS) tablet Take 1 tablet by mouth daily      fluconazole (DIFLUCAN) 200 MG tablet Take 1 tablet by mouth daily for 14 days (Patient not taking: Reported on 2/17/2023) 14 tablet 0     No current facility-administered medications for this visit. Physical Exam:   /72 (Site: Right Upper Arm, Position: Sitting)   Pulse 99   Ht 5' 7\" (1.702 m)   Wt 202 lb (91.6 kg)   SpO2 97%   BMI 31.64 kg/m²   No intake or output data in the 24 hours ending 02/17/23 1209  Wt Readings from Last 2 Encounters:   02/17/23 202 lb (91.6 kg)   02/16/23 205 lb (93 kg)     Constitutional: He is oriented to person, place, and time. He appears well-developed and well-nourished. In no acute distress. Head: Normocephalic and atraumatic. Neck: Neck supple. No JVD present. Carotid bruit is not present. No mass and no thyromegaly present. No lymphadenopathy present. Cardiovascular: Normal rate, regular rhythm, normal heart sounds and intact distal pulses. Exam reveals no gallop and no friction rub. No murmur heard. Pulmonary/Chest: Effort normal and breath sounds normal. No respiratory distress. He has no wheezes, rhonchi or rales. Abdominal: Soft, non-tender. Bowel sounds and aorta are normal. He exhibits no organomegaly, mass or bruit. Extremities: No edema, cyanosis, or clubbing. Pulses are 2+ radial/carotid/dorsalis pedis and posterior tibial bilaterally.   Neurological: He is alert and oriented to person, place, and time. He has normal reflexes. No cranial nerve deficit. Coordination normal.   Skin: Skin is warm and dry. There is no rash or diaphoresis. Psychiatric: He has a normal mood and affect. His speech is normal and behavior is normal.     EKG Interpretation 6/23/21: Sinus tachycardia  EKG Interpretation 2/17/23: Sinus tachycardia with possible prior inferior/anterior infarct    Lab Review:   Lab Results   Component Value Date/Time    TRIG 385 05/16/2022 11:20 AM    HDL 46 05/16/2022 11:20 AM    LDLCALC see below 05/16/2022 11:20 AM    LDLDIRECT 103 02/06/2023 10:22 AM    LABVLDL see below 05/16/2022 11:20 AM     Lab Results   Component Value Date/Time     02/16/2023 09:24 AM    K 4.6 02/16/2023 09:24 AM    K 3.8 06/25/2019 05:41 AM    BUN 33 02/16/2023 09:24 AM    CREATININE 1.8 02/16/2023 09:24 AM     Assessment:  1. Unstable angina   2. Essential hypertension   3. Type 2 diabetes, without insulin use or long term complications    Plan:  His symptoms represent unstable angina and I recommend he pursues a left heart catheretization next week. We discussed usually we would first pursue stress testing but I believe his symptoms warrant a sooner catheretization. I discussed the risks and benefits of cardiac catheterization with the patient. I also discussed the possible therapies and alternatives including medical management, angioplasty and stenting or coronary bypass surgery. The patient is amenable to undergoing the procedure and voices understanding of the risks. We will have the procedure scheduled. I have personally reviewed all previous testing for this visit today including imaging, lab results and EKG as detailed above. I will see him in office for follow up post procedure. This note was scribed in the presence of Dr Paco Short MD by Ryley Barriga RN.      Physician Attestation:  The scribes documentation has been prepared under my direction and personally reviewed by me in its entirety. I, Dr. Dayanara Pulliam personally performed the services described in this documentation as scribed by my RN in my presence, and I confirm that the note above accurately reflects all work, treatment, procedures, and medical decision making performed by me. Physician Attestation:  The scribes documentation has been prepared under my direction and personally reviewed by me in its entirety. I, Dr. Dayanara Pulliam personally performed the services described in this documentation as scribed by my RN in my presence, and I confirm that the note above accurately reflects all work, treatment, procedures, and medical decision making performed by me.   Vitals:    02/21/23 0956   BP: (!) 187/118   Pulse: 88   Resp: 18   Temp: 97.2 °F (36.2 °C)   SpO2: 100%

## 2023-02-21 NOTE — TELEPHONE ENCOUNTER
Patient currently in hospital for heart cath. Please call wife to see if she can get us a list of covered meds when he is home and things settle down for them.

## 2023-02-21 NOTE — PROGRESS NOTES
Patient arrived via stretcher from cath lab to room 5117. Patient is alert and oriented. On room air. TR band on right wrist filled with 11mL air. Heart monitor connected and verified with [facility].  VSS, assesment, 4eyes, and admission completed. Patient oriented to room and unit. All questions answered. Call light and bedside table within reach. Patient is resting quietly with no further questions at this time. Family members at bedside. Will continue to monitor.       Electronically signed by Zi No RN on 2/21/2023 at 1:37 PM

## 2023-02-21 NOTE — PROCEDURES
[facility]           [address] [place] [address]                            CARDIAC CATHETERIZATION    PATIENT NAME: JEANINE PEREZ                    :        1955  MED REC NO:   [id number]                          ROOM:       5117  ACCOUNT NO:   [id number]                           ADMIT DATE: 2023  PROVIDER:     Floyd Gilmore MD    DATE OF PROCEDURE:  2023    This is a [facility] Cardiology Catheterization report.    INDICATIONS FOR PROCEDURE:  Unstable angina.    The risks and benefits of the procedure were explained to the patient.   Informed consent was obtained.  He was placed on the cardiac  catheterization table and prepped and draped in sterile fashion.   Anesthesia was provided in the volar surface of the right wrist with 1%  lidocaine injected subcutaneously.  An Hussein's test had been performed  on the right wrist to ensure an intact palmar arch.    The right radial artery was accessed and cannulated and a 6-Canadian  sheath inserted using Seldinger technique.  The pre-cocktail of heparin,  verapamil, and nitroglycerin was given through the sheath port.    Over the 0.035 J-wire, the JL3.5 5-Canadian diagnostic catheter was  advanced to the ostium of left main coronary artery.  Coronary  angiography was performed to this system.  The catheter was then removed  over the wire.  Next, the JR5 5-Canadian diagnostic catheter was advanced  to the ostium of the right coronary artery and coronary angiography was  performed to this system.  Catheter was then removed over the wire.   Lastly, the pigtail catheter was advanced over the wire into the left  ventricle.  Left ventricular end-diastolic pressure measurements were  obtained.  No left ventriculogram was performed due to contrast  concerns.    The decision was made to intervene upon the ulcerated mid LAD lesion of  90%.    Over the 0.035 J-wire, the EBU 4 6-Canadian  guide catheter was advanced to  the ostium of left main coronary artery. The patient was given a  loading dose of 180 mg of oral Brilinta as well as double bolus IV  Integrilin and started on drip. Additionally heparin was given and ACTs  were checked throughout the case to maintain an ACT greater than 250  seconds. I wired the LAD with a Runthrough 0.014 coronary wire. We then  ballooned the lesion with a 2.5 x 20 mm balloon. I then elected to  stent it with a 3.5 x 38 mm [place] drug-eluting stent. This was  placed across lesion deployed to 3.50 mm in diameter. Repeat  angiography was performed demonstrating 0% residual stenosis and TR 3  flow. The wire was removed and final angiography was performed. The guide catheter was removed over the wire. No post-cocktail was  given. There were no complications from the procedure and estimated blood loss  was less than 20 mL. Moderate sedation was administered by an independent agent at my  direction and supervision with 4 mg of IV Versed and 250 mcg of  fentanyl. Total duration of sedation was 60 minutes. Vital signs were  monitored throughout and remained stable. ASA grade of III and a  Mallampati score of II prior to the procedure. FINDINGS:  1. Right-dominant coronary arterial circulation. There is a 50% mid  RCA lesion. In the left system, there is no left main disease. There  is a 60% mid circumflex lesion and a 60% lesion in the third obtuse  marginal branch. The left anterior descending artery, there was an  ulcerated mid-LAD lesion with chronic dissection of 60%. This was  intervened upon with a 3.5 x 38 mm [place] drug-eluting stent  dilated to 3.50 mm in diameter. There was 0% residual stenosis and TR  3 flow in the vessel at the conclusion of the case. 2.  Normal left ventricular end-diastolic pressure at 8 mmHg. 3.  No gradient across the aortic valve on pullback to suggest aortic  stenosis.         KIMBERLEY Luis Miguel Garcia MD    D: 02/21/2023 11:50:29       T: 02/21/2023 11:53:08     [id number]  Job#: [id number]     Doc#: [id number]    CC:  Charanjit Curry Do

## 2023-02-21 NOTE — PROGRESS NOTES
4 Eyes Skin Assessment     NAME:  Miko Jade. YOB: 1955  MEDICAL RECORD NUMBER:  [id number]    The patient is being assessed for  Admission    I agree that One RN has performed a thorough Head to Toe Skin Assessment on the patient. ALL assessment sites listed below have been assessed. Areas assessed by both nurses:    Head, Face, Ears, Shoulders, Back, Chest, Arms, Elbows, Hands, Sacrum. Buttock, Coccyx, Ischium, and Legs. Feet and Heels        Does the Patient have a Wound?  No noted wound(s)       Emmett Prevention initiated by RN: No   Wound Care Orders initiated by RN: No    Pressure Injury (Stage 3,4, Unstageable, DTI, NWPT, and Complex wounds) if present, place referral order by RN under : No    New and Established Ostomies, if present place, referral order under : No      Nurse 1 eSignature: Electronically signed by Jt Mae RN on 2/21/23 at 1:38 PM EST    **SHARE this note so that the co-signing nurse can place an eSignature**    Nurse 2 eSignature: Electronically signed by Lisset Perez RN on 2/21/23 at 3:11 PM EST

## 2023-02-21 NOTE — ANESTHESIA PRE-OP
Brief Pre-Op Note/Sedation Assessment      Valerie Baez  1955  [id number]  10:22 AM    Planned Procedure: Cardiac Catheterization Procedure  Post Procedure Plan: Return to same level of care  Consent: I have discussed with the patient and/or the patient representative the indication, alternatives, and the possible risks and/or complications of the planned procedure and the anesthesia methods. The patient and/or patient representative appear to understand and agree to proceed. Chief Complaint:   Chest Pain/Pressure      Indications for Cath Procedure:  Presentation:  Worsening Angina  2. Anginal Classification within 2 weeks:  CCS III - Symptoms with everyday living activities, i.e., moderate limitation  3. Angina Symptoms Assessment:  Typical Chest Pain  4. Heart Failure Class within last 2 weeks:  No symptoms  5. Cardiovascular Instability:  No    Prior Ischemic Workup/Eval:  Pre-Procedural Medications: Yes: Aspirin, Beta Blockers, and STATIN  2. Stress Test Completed? No    Does Patient need surgery?   Cath Valve Surgery:  No    Pre-Procedure Medical History:  Vital Signs:  BP (!) 187/118   Pulse 88   Temp 97.2 °F (36.2 °C) (Infrared)   Resp 18   Ht 5' 7\" (1.702 m)   Wt 200 lb (90.7 kg)   SpO2 100%   BMI 31.32 kg/m²     Allergies:  No Known Allergies  Medications:    Current Outpatient Medications   Medication Sig Dispense Refill    alogliptin (NESINA) 12.5 MG TABS tablet Take 1 tablet by mouth daily In place of metformin (Patient not taking: Reported on 2/21/2023) 30 tablet 0    fluconazole (DIFLUCAN) 200 MG tablet Take 1 tablet by mouth daily for 14 days (Patient not taking: Reported on 2/21/2023) 14 tablet 0    predniSONE (DELTASONE) 1 MG tablet Take 4 tablets by mouth daily Dose reduced 360 tablet 1    amLODIPine (NORVASC) 10 MG tablet TAKE ONE TABLET BY MOUTH DAILY 90 tablet 2    ferrous sulfate (FE TABS 325) 325 (65 Fe) MG EC tablet TAKE ONE TABLET BY MOUTH TWICE A DAY IN THE MORNING AND AT BEDTIME 180 tablet 1    glipiZIDE (GLUCOTROL) 10 MG tablet TAKE TWO TABLETS BY MOUTH TWICE A  tablet 1    Easy Touch Lancets 33G/Twist MISC USE TO TEST THREE TIMES A  each 5    metoprolol succinate (TOPROL XL) 25 MG extended release tablet Take 1 tablet by mouth in the morning.  90 tablet 3    lovastatin (MEVACOR) 40 MG tablet TAKE ONE TABLET BY MOUTH ONCE NIGHTLY 90 tablet 2    Cholecalciferol (VITAMIN D3) 50 MCG (2000 UT) CAPS Take 1 capsule by mouth daily 90 capsule 2    hydrALAZINE (APRESOLINE) 25 MG tablet TAKE ONE TABLET BY MOUTH THREE TIMES A  tablet 3    pioglitazone (ACTOS) 45 MG tablet Take 1 tablet by mouth daily Dose increased 90 tablet 3    blood glucose test strips ([employer] GLUCOSE TEST) strip Test twice daily 100 strip 5    omeprazole (PRILOSEC) 20 MG delayed release capsule Take 1 capsule by mouth Daily 90 capsule 2    losartan (COZAAR) 50 MG tablet Take 1 tablet by mouth daily 90 tablet 3    tamsulosin (FLOMAX) 0.4 MG capsule TAKE ONE CAPSULE BY MOUTH DAILY 90 capsule 3    Blood Glucose Monitoring Suppl (ONE TOUCH ULTRA 2) w/Device KIT 1 kit by Does not apply route daily 1 kit 0    Ascorbic Acid (VITAMIN C) 1000 MG tablet Take 1 tablet by mouth daily (Patient not taking: Reported on 2/21/2023) 90 tablet 3    Omega-3 Fatty Acids (FISH OIL) 1000 MG CAPS Take 1 capsule by mouth daily 90 capsule 3    Multiple Vitamins-Minerals (THERAPEUTIC MULTIVITAMIN-MINERALS) tablet Take 1 tablet by mouth daily       Current Facility-Administered Medications   Medication Dose Route Frequency Provider Last Rate Last Admin    sodium chloride flush 0.9 % injection 5-40 mL  5-40 mL IntraVENous 2 times per day Isabel Rodarte MD        sodium chloride flush 0.9 % injection 5-40 mL  5-40 mL IntraVENous PRN Isabel Rodarte MD        0.9 % sodium chloride infusion   IntraVENous PRN Isabel Rodarte MD           Past Medical History:    Past Medical History: Diagnosis Date    Anemia     Arthritis     Diabetes mellitus ([facility] [address]     Enlarged prostate     High blood pressure     Hyperlipidemia     Reflux esophagitis     Risk for falls     Tachycardia     Temporal arteritis ([facility] [address]     Urinary stone 06/18/2019    stent placed 6/18/2019       Surgical History:    Past Surgical History:   Procedure Laterality Date    APPENDECTOMY  01/01/1984    COLONOSCOPY  08/27/2021    CYSTOSCOPY Left 6/18/2019    CYSTOSCOPY LEFT URETERAL STENT INSERTION performed by Delmi Scales MD at [address] [place] [address] Rd Right 6/28/2022    RIGHT PAROTIDECTOMY performed by Meg Brand MD at [facility]:  Pre-Sedation Documentation and Exam:  I have personally completed a history, physical exam & review of systems for this patient (see notes). Prior History of Anesthesia Complications:   none    Modified Mallampati:  III (soft palate, base of uvula visible)    ASA Classification:  Class 2 - A normal healthy patient with mild systemic disease    Angeles Scale: Activity:  2 - Able to move 4 extremities voluntarily on command  Respiration:  2 - Able to breathe deeply and cough freely  Circulation:  2 - BP+/- 20mmHg of normal  Consciousness:  2 - Fully awake  Oxygen Saturation (color):  2 - Able to maintain oxygen saturation >92% on room air    Sedation/Anesthesia Plan:  Guard the patient's safety and welfare. Minimize physical discomfort and pain. Minimize negative psychological responses to treatment by providing sedation and analgesia and maximize the potential amnesia. Patient to meet pre-procedure discharge plan.     Medication Planned:  midazolam intravenously and fentanyl intravenously    Patient is an appropriate candidate for plan of sedation:   yes      Electronically signed by Niya March MD on 2/21/2023 at 10:22 AM

## 2023-02-22 ENCOUNTER — APPOINTMENT (OUTPATIENT)
Dept: GENERAL RADIOLOGY | Age: 68
End: 2023-02-22
Attending: INTERNAL MEDICINE
Payer: MEDICARE

## 2023-02-22 VITALS
WEIGHT: 195.33 LBS | DIASTOLIC BLOOD PRESSURE: 90 MMHG | HEART RATE: 87 BPM | RESPIRATION RATE: 18 BRPM | SYSTOLIC BLOOD PRESSURE: 139 MMHG | HEIGHT: 67 IN | BODY MASS INDEX: 30.66 KG/M2 | OXYGEN SATURATION: 95 % | TEMPERATURE: 97.8 F

## 2023-02-22 PROBLEM — I25.10 CORONARY ARTERY DISEASE INVOLVING NATIVE CORONARY ARTERY: Status: ACTIVE | Noted: 2023-02-22

## 2023-02-22 LAB
ANION GAP SERPL CALCULATED.3IONS-SCNC: 13 MMOL/L (ref 3–16)
BUN BLDV-MCNC: 24 MG/DL (ref 7–20)
CALCIUM SERPL-MCNC: 9.6 MG/DL (ref 8.3–10.6)
CHLORIDE BLD-SCNC: 103 MMOL/L (ref 99–110)
CHOLESTEROL, TOTAL: 180 MG/DL (ref 0–199)
CO2: 23 MMOL/L (ref 21–32)
CREAT SERPL-MCNC: 1.5 MG/DL (ref 0.8–1.3)
GFR SERPL CREATININE-BSD FRML MDRD: 50 ML/MIN/{1.73_M2}
GLUCOSE BLD-MCNC: 182 MG/DL (ref 70–99)
GLUCOSE BLD-MCNC: 213 MG/DL (ref 70–99)
GLUCOSE BLD-MCNC: 222 MG/DL (ref 70–99)
GLUCOSE BLD-MCNC: 294 MG/DL (ref 70–99)
HCT VFR BLD CALC: 35.7 % (ref 40.5–52.5)
HDLC SERPL-MCNC: 36 MG/DL (ref 40–60)
HEMOGLOBIN: 12.1 G/DL (ref 13.5–17.5)
IRON SATURATION: 17 % (ref 20–50)
IRON: 48 UG/DL (ref 59–158)
LDL CHOLESTEROL CALCULATED: 88 MG/DL
LV EF: 58 %
LVEF MODALITY: NORMAL
MCH RBC QN AUTO: 30.2 PG (ref 26–34)
MCHC RBC AUTO-ENTMCNC: 33.9 G/DL (ref 31–36)
MCV RBC AUTO: 89.1 FL (ref 80–100)
PDW BLD-RTO: 14.7 % (ref 12.4–15.4)
PERFORMED ON: ABNORMAL
PLATELET # BLD: 194 K/UL (ref 135–450)
PMV BLD AUTO: 7.1 FL (ref 5–10.5)
POTASSIUM SERPL-SCNC: 3.8 MMOL/L (ref 3.5–5.1)
PRO-BNP: 128 PG/ML (ref 0–124)
RBC # BLD: 4 M/UL (ref 4.2–5.9)
SODIUM BLD-SCNC: 139 MMOL/L (ref 136–145)
TOTAL IRON BINDING CAPACITY: 279 UG/DL (ref 260–445)
TRIGL SERPL-MCNC: 281 MG/DL (ref 0–150)
VLDLC SERPL CALC-MCNC: 56 MG/DL
WBC # BLD: 9.5 K/UL (ref 4–11)

## 2023-02-22 PROCEDURE — 99239 HOSP IP/OBS DSCHRG MGMT >30: CPT | Performed by: NURSE PRACTITIONER

## 2023-02-22 PROCEDURE — 6360000002 HC RX W HCPCS: Performed by: INTERNAL MEDICINE

## 2023-02-22 PROCEDURE — 2580000003 HC RX 258: Performed by: INTERNAL MEDICINE

## 2023-02-22 PROCEDURE — 85027 COMPLETE CBC AUTOMATED: CPT

## 2023-02-22 PROCEDURE — 83880 ASSAY OF NATRIURETIC PEPTIDE: CPT

## 2023-02-22 PROCEDURE — 83550 IRON BINDING TEST: CPT

## 2023-02-22 PROCEDURE — 36415 COLL VENOUS BLD VENIPUNCTURE: CPT

## 2023-02-22 PROCEDURE — C8929 TTE W OR WO FOL WCON,DOPPLER: HCPCS

## 2023-02-22 PROCEDURE — 71045 X-RAY EXAM CHEST 1 VIEW: CPT

## 2023-02-22 PROCEDURE — 80048 BASIC METABOLIC PNL TOTAL CA: CPT

## 2023-02-22 PROCEDURE — 80061 LIPID PANEL: CPT

## 2023-02-22 PROCEDURE — 6370000000 HC RX 637 (ALT 250 FOR IP): Performed by: INTERNAL MEDICINE

## 2023-02-22 PROCEDURE — 83540 ASSAY OF IRON: CPT

## 2023-02-22 PROCEDURE — 6360000004 HC RX CONTRAST MEDICATION: Performed by: INTERNAL MEDICINE

## 2023-02-22 RX ORDER — ROSUVASTATIN CALCIUM 40 MG/1
40 TABLET, COATED ORAL NIGHTLY
Qty: 30 TABLET | Refills: 3 | Status: SHIPPED | OUTPATIENT
Start: 2023-02-22

## 2023-02-22 RX ORDER — ASPIRIN 81 MG/1
81 TABLET, CHEWABLE ORAL DAILY
Qty: 30 TABLET | Refills: 3 | Status: SHIPPED | OUTPATIENT
Start: 2023-02-23

## 2023-02-22 RX ADMIN — ENOXAPARIN SODIUM 40 MG: 100 INJECTION SUBCUTANEOUS at 10:16

## 2023-02-22 RX ADMIN — PERFLUTREN 1.5 ML: 6.52 INJECTION, SUSPENSION INTRAVENOUS at 09:14

## 2023-02-22 RX ADMIN — ASPIRIN 81 MG CHEWABLE TABLET 81 MG: 81 TABLET CHEWABLE at 10:14

## 2023-02-22 RX ADMIN — Medication 10 ML: at 10:17

## 2023-02-22 RX ADMIN — FERROUS SULFATE TAB EC 324 MG (65 MG FE EQUIVALENT) 325 MG: 324 (65 FE) TABLET DELAYED RESPONSE at 10:15

## 2023-02-22 RX ADMIN — TAMSULOSIN HYDROCHLORIDE 0.4 MG: 0.4 CAPSULE ORAL at 10:15

## 2023-02-22 RX ADMIN — PREDNISONE 1 MG: 1 TABLET ORAL at 10:20

## 2023-02-22 RX ADMIN — TICAGRELOR 90 MG: 90 TABLET ORAL at 10:15

## 2023-02-22 RX ADMIN — GLIPIZIDE 10 MG: 5 TABLET ORAL at 07:44

## 2023-02-22 RX ADMIN — Medication 10 ML: at 10:16

## 2023-02-22 RX ADMIN — TICAGRELOR 90 MG: 90 TABLET ORAL at 17:35

## 2023-02-22 RX ADMIN — LOSARTAN POTASSIUM 50 MG: 50 TABLET, FILM COATED ORAL at 10:15

## 2023-02-22 RX ADMIN — PIOGLITAZONE 45 MG: 15 TABLET ORAL at 10:15

## 2023-02-22 RX ADMIN — METOPROLOL SUCCINATE 25 MG: 25 TABLET, FILM COATED, EXTENDED RELEASE ORAL at 10:15

## 2023-02-22 RX ADMIN — AMLODIPINE BESYLATE 10 MG: 10 TABLET ORAL at 10:15

## 2023-02-22 RX ADMIN — VITAMIN D, TAB 1000IU (100/BT) 2000 UNITS: 25 TAB at 10:16

## 2023-02-22 RX ADMIN — ACETAMINOPHEN 650 MG: 325 TABLET ORAL at 06:05

## 2023-02-22 RX ADMIN — PANTOPRAZOLE SODIUM 40 MG: 40 TABLET, DELAYED RELEASE ORAL at 05:49

## 2023-02-22 RX ADMIN — MULTIPLE VITAMINS W/ MINERALS TAB 1 TABLET: TAB at 10:15

## 2023-02-22 ASSESSMENT — PAIN - FUNCTIONAL ASSESSMENT: PAIN_FUNCTIONAL_ASSESSMENT: ACTIVITIES ARE NOT PREVENTED

## 2023-02-22 ASSESSMENT — PAIN DESCRIPTION - ORIENTATION: ORIENTATION: RIGHT

## 2023-02-22 ASSESSMENT — PAIN SCALES - GENERAL
PAINLEVEL_OUTOF10: 0
PAINLEVEL_OUTOF10: 3

## 2023-02-22 ASSESSMENT — PAIN DESCRIPTION - LOCATION: LOCATION: SHOULDER

## 2023-02-22 ASSESSMENT — PAIN DESCRIPTION - DESCRIPTORS: DESCRIPTORS: ACHING

## 2023-02-22 NOTE — PLAN OF CARE
Problem: Chronic Conditions and Co-morbidities  Goal: Patient's chronic conditions and co-morbidity symptoms are monitored and maintained or improved  Outcome: Progressing  Flowsheets (Taken 2/21/2023 1943)  Care Plan - Patient's Chronic Conditions and Co-Morbidity Symptoms are Monitored and Maintained or Improved: Monitor and assess patient's chronic conditions and comorbid symptoms for stability, deterioration, or improvement     Problem: Discharge Planning  Goal: Discharge to home or other facility with appropriate resources  Outcome: Progressing  Flowsheets (Taken 2/21/2023 1943)  Discharge to home or other facility with appropriate resources: Identify barriers to discharge with patient and caregiver     Problem: Pain  Goal: Verbalizes/displays adequate comfort level or baseline comfort level  Outcome: Progressing     Problem: Safety - Adult  Goal: Free from fall injury  Outcome: Progressing     Problem: Cardiovascular - Adult  Goal: Maintains optimal cardiac output and hemodynamic stability  Outcome: Progressing  Flowsheets (Taken 2/21/2023 1943)  Maintains optimal cardiac output and hemodynamic stability:   Monitor blood pressure and heart rate   Monitor urine output and notify Licensed Independent Practitioner for values outside of normal range   Assess for signs of decreased cardiac output  Goal: Absence of cardiac dysrhythmias or at baseline  Outcome: Progressing  Flowsheets (Taken 2/21/2023 1943)  Absence of cardiac dysrhythmias or at baseline:   Monitor cardiac rate and rhythm   Assess for signs of decreased cardiac output     Problem: Hematologic - Adult  Goal: Maintains hematologic stability  Outcome: Progressing  Flowsheets (Taken 2/21/2023 1943)  Maintains hematologic stability:   Assess for signs and symptoms of bleeding or hemorrhage   Monitor labs for bleeding or clotting disorders

## 2023-02-22 NOTE — CONSULTS
[address]  CARDIOPULMONARY PHASE I CONSULT        NAME:  Spencer Brand. MEDICAL RECORD NUMBER:  [id number]  AGE: 69 y.o. GENDER: male  : 1955  TODAY'S DATE:  2023    Subjective:     VISIT TYPE: evaluation     ADMITTING PHYSICIAN:  Stefani Heaton MD     PAST MEDICAL HISTORY        Diagnosis Date    Anemia     Arthritis     Diabetes mellitus ([facility] [address]     Enlarged prostate     High blood pressure     Hyperlipidemia     Reflux esophagitis     Risk for falls     Tachycardia     Temporal arteritis ([facility] [address]     Urinary stone 2019    stent placed 2019       SOCIAL HISTORY    Social History     Tobacco Use    Smoking status: Never    Smokeless tobacco: Never   Vaping Use    Vaping Use: Never used   Substance Use Topics    Alcohol use: No    Drug use: Never       ALLERGIES    No Known Allergies    MEDICATIONS  Scheduled Meds:   sodium chloride flush  5-40 mL IntraVENous 2 times per day    sodium chloride flush  5-40 mL IntraVENous 2 times per day    therapeutic multivitamin-minerals  1 tablet Oral Daily    tamsulosin  0.4 mg Oral Daily    losartan  50 mg Oral Daily    pioglitazone  45 mg Oral Daily    Vitamin D  2,000 Units Oral Daily    metoprolol succinate  25 mg Oral Daily    glipiZIDE  10 mg Oral QAM AC    ferrous sulfate  325 mg Oral Daily with breakfast    amLODIPine  10 mg Oral Daily    pantoprazole  40 mg Oral QAM AC    rosuvastatin  40 mg Oral Nightly    aspirin  81 mg Oral Daily    ticagrelor  90 mg Oral BID    predniSONE  1 mg Oral BID       ADMIT DATE: 2023      Objective:     ADMISSION DIAGNOSIS:   No admission diagnoses are documented for this encounter.      BP (!) 161/96   Pulse 91   Temp 97.8 °F (36.6 °C) (Oral)   Resp 18   Ht 5' 7\" (1.702 m)   Wt 195 lb 5.2 oz (88.6 kg)   SpO2 96%   BMI 30.59 kg/m²     ADMIT:  Weight: 200 lb (90.7 kg)    TODAY: Weight: 195 lb 5.2 oz (88.6 kg)    Wt Readings from Last 3 Encounters:   23 195 lb 5.2 oz (88.6 kg)   02/17/23 202 lb (91.6 kg)   02/16/23 205 lb (93 kg)        ECHOCARDIOGRAM: 2/22/2023    Results pending     HgBA1c:  No components found for: HGBA1C  LIPID PANEL:    Lab Results   Component Value Date/Time    CHOL 180 02/22/2023 04:45 AM    HDL 36 02/22/2023 04:45 AM    TRIG 281 02/22/2023 04:45 AM        Assessment:     CONSULTS:   IP CONSULT TO CARDIAC REHAB  IP CONSULT TO CARDIAC REHAB    Patient has a CARDIOLOGY CONSULT: Yes        EDUCATION STATUS: Patient and Caregiver   [x]  Provided both written and verbal education on Cardiopulmonary Rehabilitation. []  Provided instructions for smoking cessation programs. [x]  Provided education of CAD risk factors. [x]  Provided recommendations on activity and exercise. []  Provided education on medications. []  Provided education on coronary anatomy and coronary interventions. [x]  Other:Benefits of exercise and risk factor modification    CURRENT DIET: ADULT DIET; Regular; No Added Salt (3-4 gm)    EDUCATIONAL PACKETS PROVIDED- PRINTED FROM [employer]. Titles and material given:  Yes   [x]  Managing Heart Disease and Preventing Stroke  []  Heart Owner's Manual  []  Living Well with Heart Failure  []  Other:     PATIENT/CAREGIVER TEACHING:    Level of patient/caregiver understanding able to:   [x] Verbalize understanding   [] Demonstrate understanding       [] Teach back        [] Needs reinforcement     []  Other:       TEACHING TIME:  10 minutes       Plan:       DISCHARGE PLAN:  Placement for patient upon discharge: home with support   Hospice Care:  no  Code Status: Full Code  Discharge appointment scheduled: Yes     RECOMMENDATIONS:   [x]  Patient/Family instructed to call Cardiopulmonary Rehab ([phone]) upon discharge schedule the initial evaluation  [x]  Encourage to call Cardiopulmonary Rehabilitation with any questions. []  Referral to alternate Cardiopulmonary Rehabilitation facility.    [x]  Other:  Patient is interested and daughter at the bedside very much encouraging him to participate   [] Appointment scheduled for   [x] Chooses to not schedule at this time : would prefer to schedule after his discharge follow up         Electronically signed by Chao Otoole RN,MS on 2/22/2023 at 2:37 PM

## 2023-02-22 NOTE — ACP (ADVANCE CARE PLANNING)
Advance Care Planning     Advance Care Planning Activator (Inpatient)  Conversation Note      Date of ACP Conversation: 2023     Conversation Conducted with: Patient with Decision Making Capacity    ACP Activator: Jenifer Romo 43 Decision Maker:     Current Designated Health Care Decision Maker:     Primary Decision Maker: Dianncorrie Rc - [phone]    Secondary Decision Maker: Nazario Tran - [phone]    Today we documented Decision Maker(s) consistent with Legal Next of Kin hierarchy. Care Preferences    Ventilation: \"If you were in your present state of health and suddenly became very ill and were unable to breathe on your own, what would your preference be about the use of a ventilator (breathing machine) if it were available to you? \"      Would the patient desire the use of ventilator (breathing machine)?: yes    \"If your health worsens and it becomes clear that your chance of recovery is unlikely, what would your preference be about the use of a ventilator (breathing machine) if it were available to you? \"     Would the patient desire the use of ventilator (breathing machine)?: No      Resuscitation  \"CPR works best to restart the heart when there is a sudden event, like a heart attack, in someone who is otherwise healthy. Unfortunately, CPR does not typically restart the heart for people who have serious health conditions or who are very sick. \"    \"In the event your heart stopped as a result of an underlying serious health condition, would you want attempts to be made to restart your heart (answer \"yes\" for attempt to resuscitate) or would you prefer a natural death (answer \"no\" for do not attempt to resuscitate)? \" yes       [] Yes   [] No   Educated Patient / Brent Apgar regarding differences between Advance Directives and portable DNR orders.     Length of ACP Conversation in minutes:      Conversation Outcomes:  ACP discussion completed    Follow-up plan: [] Schedule follow-up conversation to continue planning  [] Referred individual to Provider for additional questions/concerns   [] Advised patient/agent/surrogate to review completed ACP document and update if needed with changes in condition, patient preferences or care setting    [x] This note routed to one or more involved healthcare providers    #[phone]  Electronically signed by Manuela Baker RN on 2/22/2023 at 11:51 AM

## 2023-02-22 NOTE — PROGRESS NOTES
[address]   Daily Progress Note      Admit Date:  2/21/2023    CC: chest pain    HPI:   Walt Rodriguez. is a 69 y.o. male with PMH HTN, DM2, TIA. He was going to PT before admission - he states for weakness and falls. Presented to Cardiology office with chest pain concerning for angina. Dr. Courtney Sharpe performed LHC/ PCI with LICHA to mid LAD 2/21/2022. Today he has been up ad pedro luis in room. He denies any CP or SOB with minimal activity. Ambulates in farrell. Mild SOB with walking. Denies any CP, LH, dizziness, palpitations. R wrist site CDI, no hematoma. Denies numbness or tingling. Review of Systems:   General: Denies fever, chills  Skin: Denies skin changes, rash, itching, lesions.   HEENT: Denies headache, dizziness, vision changes, nosebleeds, sore throat, nasal drainage  RESP: Denies cough, sputum, wheeze, snoring  CARD: Denies palpitations,  murmur  GI:Denies nausea, vomiting, heartburn, loss of appetite, change in bowels  : Denies frequency, pain, incontinence, polyuria  VASC: Denies claudication, leg cramps, clots  MUSC/SKEL: Denies pain, stiffness, arthritis  PSYCH: Denies anxiety, depression, stress  NEURO: Denies numbness, tingling, weakness,change in mood or memory  HEME: Denies abn bruising, bleeding, anemia  ENDO: Denies intolerance to heat, cold, excessive thirst or hunger, hx thyroid disease    Objective:   BP (!) 156/95   Pulse 91   Temp 97.8 °F (36.6 °C) (Oral)   Resp 18   Ht 5' 7\" (1.702 m)   Wt 195 lb 5.2 oz (88.6 kg)   SpO2 96%   BMI 30.59 kg/m²         Intake/Output Summary (Last 24 hours) at 2/22/2023 1209  Last data filed at 2/22/2023 1024  Gross per 24 hour   Intake 960 ml   Output --   Net 960 ml     I/O since adm:     WEIGHT:Admit Weight: 200 lb (90.7 kg)         Today  Weight: 195 lb 5.2 oz (88.6 kg)   DRY WEIGHT:  Wt Readings from Last 3 Encounters:   02/22/23 195 lb 5.2 oz (88.6 kg)   02/17/23 202 lb (91.6 kg)   02/16/23 205 lb (93 kg) Physical Exam:  GEN: Appears well, no acute distress  SKIN: Pink, warm, dry. HEENT: PERRLA. No adenopathy. LUNG: AP diameter normal. Diminished BS bilateral. No wheeze crackle or ronchi. Respiratory effort normal.  HEART: S1S2 A/R. No JVD. No carotid bruit. No murmur, rub or gallop. ABD: Soft, nontender. +BS X 4 quads. No hepatomegaly. EXT: Radial and pedal pulses 2+ and symmetric. Without varicosities. No edema. MUSCSKEL: Good ROM X4 extremities. No deformity. NEURO: A/O X3. Calm and cooperative. Telemetry: NSR, ST with activity    Medications:    sodium chloride flush  5-40 mL IntraVENous 2 times per day    sodium chloride flush  5-40 mL IntraVENous 2 times per day    therapeutic multivitamin-minerals  1 tablet Oral Daily    tamsulosin  0.4 mg Oral Daily    losartan  50 mg Oral Daily    pioglitazone  45 mg Oral Daily    Vitamin D  2,000 Units Oral Daily    metoprolol succinate  25 mg Oral Daily    glipiZIDE  10 mg Oral QAM AC    ferrous sulfate  325 mg Oral Daily with breakfast    amLODIPine  10 mg Oral Daily    pantoprazole  40 mg Oral QAM AC    rosuvastatin  40 mg Oral Nightly    aspirin  81 mg Oral Daily    ticagrelor  90 mg Oral BID    predniSONE  1 mg Oral BID      sodium chloride      sodium chloride      dextrose       sodium chloride flush, sodium chloride, sodium chloride flush, sodium chloride, acetaminophen, sodium chloride, ondansetron, hydrALAZINE, fentaNYL, glucose, dextrose bolus **OR** dextrose bolus, glucagon (rDNA), dextrose    Lab Data: I have reviewed all labs below today.    CBC:   Recent Labs     02/22/23  0445   WBC 9.5   HGB 12.1*   HCT 35.7*   MCV 89.1        BMP:   Recent Labs     02/22/23  0445      K 3.8      CO2 23   BUN 24*   CREATININE 1.5*     GLUCOSE:   Recent Labs     02/22/23  0445   GLUCOSE 182*     LIVER PROFILE:   Lab Results   Component Value Date/Time    AST 18 02/06/2023 10:22 AM    ALT 24 02/06/2023 10:22 AM    LABALBU 4.5 02/06/2023 10:22 AM    BILIDIR <0.2 05/16/2022 11:20 AM    BILITOT 0.7 02/06/2023 10:22 AM    ALKPHOS 80 02/06/2023 10:22 AM     PT/INR:   Lab Results   Component Value Date/Time    PROTIME 12.9 06/20/2022 09:12 AM    INR 0.98 06/20/2022 09:12 AM     APTT:   Lab Results   Component Value Date/Time    APTT 34.1 06/20/2022 09:12 AM     Pro-BNP:  No results found for: PROBNP  Parameters:   > 450 pg/mL under age 48  > 900 pg/mL ages 54-65  > 1800 pg/mL over age 76    ENZYMES:No results found for: CKTOTAL, CKMB, CKMBINDEX, TROPONINI  FASTING LIPID PANEL:  Lab Results   Component Value Date/Time    CHOL 214 05/16/2022 11:20 AM    HDL 46 05/16/2022 11:20 AM    LDLCALC see below 05/16/2022 11:20 AM    TRIG 385 05/16/2022 11:20 AM       Diagnostics:    EKG:   NSR hx anteriolateral infarct    ECHO:  Pending    Cardiac Angiography:   FINDINGS:  1. Right-dominant coronary arterial circulation. There is a 50% mid  RCA lesion. In the left system, there is no left main disease. There  is a 60% mid circumflex lesion and a 60% lesion in the third obtuse  marginal branch. The left anterior descending artery, there was an  ulcerated mid-LAD lesion with chronic dissection of 60%. This was  intervened upon with a 3.5 x 38 mm [place] [employer] drug-eluting stent  dilated to 3.50 mm in diameter. There was 0% residual stenosis and TR  3 flow in the vessel at the conclusion of the case. 2.  Normal left ventricular end-diastolic pressure at 8 mmHg. 3.  No gradient across the aortic valve on pullback to suggest aortic  stenosis. Assessment/Plan:    1.) CAD: S/P LICHA to LAD. Angina resolved. EKG shows hx of anterolateral infarct. ECHO pending.    DAPT: asa, brilinta  Beta Blocker: toprol XL  ACEi/ARB:losartan  Anti anginal: NTG PRN  Lipid management/high intensity statin: crestor  Risk factor management: high blood pressure, high cholesterol, Diabetes, smoking, obesity, family hx  Lifestyle modification: Heart healthy diet, regular exercise, weight loss, smoking cessation, stress reduction  Cardiac Rehab: Phase 1 & 2    2.) Hypertension:   Goal BP <130/80. Not met. Plan to adjust meds after reviewing ECHO. Non pharmacologic interventions include:   -weight loss  -heart healthy low sodium and low fat diet that consist of mostly fruits, vegetables and grains (Dash diet)  -limited amount of alcohol (no more than 1 drink/day for women, 2 drinks/day for men)  -regular physical activity  -no smoking  -stress reduction    3.) Decreased BS, no known lung hx. Mild SOB when ambulating in farrell. ECHO pending. CXR from 2019 with possible pulm edema. Will check CXR and BNP.     4.) Hyperlipidemia:   LDL Goal < 70- last lipid panel 2020, repeat today  Statin:  lovastatin changed to crestor          Low cholesterol diet  Liver fx 2 months after initiation then q6mos  Lipid panel annually    5.) Hx TIA:  No neuro deficits    If ECHO and CXR OK will plan for DC later today.      Electronically signed by SERGIO Keith CNP on 2/22/2023 at 12:09 PM

## 2023-02-22 NOTE — CARE COORDINATION
Case Management Assessment  Initial Evaluation    Date/Time of Evaluation: 2/22/2023 11:53 AM  Assessment Completed by: Analy Araya RN    If patient is discharged prior to next notation, then this note serves as note for discharge by case management. Patient Name: Shawn Tamez YOB: 1955  Diagnosis: No admission diagnoses are documented for this encounter. Date / Time: 2/21/2023 11:21 AM    Patient Admission Status: Outpatient in a bed   Readmission Risk (Low < 19, Mod (19-27), High > 27): No data recorded  Current PCP: Олег Quintana, DO  PCP verified by CM? Yes    Chart Reviewed: Yes      History Provided by: Patient  Patient Orientation: Alert and Oriented, Person, Place, Situation, Self    Patient Cognition: Alert    Hospitalization in the last 30 days (Readmission):  No    If yes, Readmission Assessment in  Navigator will be completed. Advance Directives:      Code Status: Full Code   Patient's Primary Decision Maker is: Legal Next of Kin    Primary Decision MakerHamp Dominick  Spouse - [phone]    Secondary Decision Maker: [facility] Child - [phone]    Discharge Planning:    Patient lives with: Spouse/Significant Other, Children Type of Home: House  Primary Care Giver: Self  Patient Support Systems include: Spouse/Significant Other, Children   Current Financial resources: Medicare  Current community resources:  None, wife has home care  Current services prior to admission: None            Current DME:  None            Type of Home Care services:  None    ADLS  Prior functional level: Independent in ADLs/IADLs  Current functional level: Independent in ADLs/IADLs    No current PT/OT orders    Family can provide assistance at DC: Yes  Would you like Case Management to discuss the discharge plan with any other family members/significant others, and if so, who?  No  Plans to Return to Present Housing: Yes  Other Identified Issues/Barriers to RETURNING to current housing: N/A  Potential Assistance needed at discharge: N/A            Potential DME:  N/A  Patient expects to discharge to: [address] [place] [address] for transportation at discharge: Family    Financial    Payor: MEDICARE / Plan: MEDICARE PART A AND B / Product Type: *No Product type* /     Does insurance require precert for SNF: No    Potential assistance Purchasing Medications: No  Meds-to-Beds request: Yes      [facility] [phone] - [address] [facility], [address] [place] [address]. Michelle Samaria [phone] - [phone]  [address] [place] [address]. [place] [address]  Phone: [phone] Fax: [phone]      Notes:    Factors facilitating achievement of predicted outcomes: Family support, Motivated, Cooperative, Pleasant, and Sense of humor    Barriers to discharge: Decreased endurance    Additional Case Management Notes:   Patient plans to return home with wife. Family can transport. Denied any discharge needs. The Plan for Transition of Care is related to the following treatment goals of No admission diagnoses are documented for this encounter. The Patient and/or Patient Representative Agree with the Discharge Plan?  Yes    Yin Vasquez RN  Case Management Department  Ph: [phone]

## 2023-02-22 NOTE — DISCHARGE SUMMARY
[address]    DISCHARGE SUMMARY      Patient ID:  Leighann Leonard  [id number] y.o. 1955    Admit date: 2/21/2023    Discharge date:  2/22/2023    Admitting Physician: Jacky Levin MD     Discharge Physician: OMID Amezcua, APRN - CNP     Admission Diagnoses: No admission diagnoses are documented for this encounter. Discharge Diagnoses:   Patient Active Problem List   Diagnosis    Urinary retention    Essential hypertension    BPH (benign prostatic hyperplasia)    Ureterolithiasis    GCA (giant cell arteritis) ([facility] [address]    Uncontrolled type 2 diabetes mellitus with hyperglycemia (HCC)    Mixed hyperlipidemia    Anemia due to chronic blood loss    Chronic kidney disease, stage 3a (HCC)    Diabetes mellitus, type 2 (HCC)    Dyslipidemia    Dyspepsia    GERD (gastroesophageal reflux disease)    Kidney stone    Vitamin D deficiency    Serum creatinine raised    Umbilical hernia without obstruction and without gangrene    Unstable angina (HCC)    Coronary artery disease involving native coronary artery        Discharged Condition: good    Hospital Course: Leighann Carey was admitted  with PMH HTN, DM2, TIA. He was going to PT before admission - he states for weakness and falls. Presented to Cardiology office with chest pain concerning for angina. Dr. Dayanara Pulliam performed LHC/ PCI with LICHA to mid LAD 2/21/2022. Today he has been up ad pedro luis in room. He denies any CP or SOB with minimal activity. Ambulates in [place]. Mild SOB with walking. Denies any CP, LH, dizziness, palpitations. R wrist site CDI, no hematoma. Denies numbness or tingling    1.) CAD: S/P LICHA to LAD. Angina resolved. EKG shows hx of anterolateral infarct. ECHO pending.    DAPT: asa, brilinta  Beta Blocker: toprol XL  ACEi/ARB:losartan  Anti anginal: NTG PRN  Lipid management/high intensity statin: crestor  Risk factor management: high blood pressure, high cholesterol, Diabetes, smoking, obesity, family hx  Lifestyle modification: Heart healthy diet, regular exercise, weight loss, smoking cessation, stress reduction  Cardiac Rehab: Phase 1 & 2     2.) Hypertension:   Goal BP <130/80. Not met. Plan to adjust meds after reviewing ECHO.  Non pharmacologic interventions include:   -weight loss  -heart healthy low sodium and low fat diet that consist of mostly fruits, vegetables and grains (Dash diet)  -limited amount of alcohol (no more than 1 drink/day for women, 2 drinks/day for men)  -regular physical activity  -no smoking  -stress reduction     3.) Decreased BS, no known lung hx. Mild SOB when ambulating in [place].   ECHO pending.   CXR from 2019 with possible pulm edema.  Will check CXR and BNP.      4.) Hyperlipidemia:   LDL Goal < 70- last lipid panel 2020, repeat today  Statin:  lovastatin changed to crestor          Low cholesterol diet  Liver fx 2 months after initiation then q6mos  Lipid panel annually     5.) Hx TIA:  No neuro deficits     If ECHO and CXR OK will plan for DC later today.     Consults:  IP CONSULT TO CARDIAC REHAB  IP CONSULT TO CARDIAC REHAB    Significant Diagnostic Studies:     Cardiac Angiography:   FINDINGS:  1.  Right-dominant coronary arterial circulation.  There is a 50% mid  RCA lesion.  In the left system, there is no left main disease.  There  is a 60% mid circumflex lesion and a 60% lesion in the third obtuse  marginal branch.  The left anterior descending artery, there was an  ulcerated mid-LAD lesion with chronic dissection of 60%.  This was  intervened upon with a 3.5 x 38 mm [place] drug-eluting stent  dilated to 3.50 mm in diameter.  There was 0% residual stenosis and TR  3 flow in the vessel at the conclusion of the case.  2.  Normal left ventricular end-diastolic pressure at 8 mmHg.  3.  No gradient across the aortic valve on pullback to suggest aortic  stenosis.    ECHO:   Summary   Definity contrast administered to rule out wall motion abnormalities.   Left  ventricular cavity size is normal.   There is moderately increased left ventricular wall thickness. Overall left ventricular systolic function appears normal.   Ejection fraction is visually estimated to be 55-60%. There are no significant wall motion abnormalities appreciated. Indeterminate diastolic dysfunction. The right ventricle is normal in size and function. There are no significant valvular abnormalities appreciated in this study.         Labs:   Lab Results   Component Value Date    CREATININE 1.5 (H) 02/22/2023    BUN 24 (H) 02/22/2023     02/22/2023    K 3.8 02/22/2023     02/22/2023    CO2 23 02/22/2023      Lab Results   Component Value Date    WBC 9.5 02/22/2023    HGB 12.1 (L) 02/22/2023    HCT 35.7 (L) 02/22/2023    MCV 89.1 02/22/2023     02/22/2023      Lab Results   Component Value Date    INR 0.98 06/20/2022    PROTIME 12.9 06/20/2022    No results found for: BNP    Disposition: home    Patient Instructions:      Medication List        START taking these medications      aspirin 81 MG chewable tablet  Take 1 tablet by mouth daily  Start taking on: February 23, 2023     rosuvastatin 40 MG tablet  Commonly known as: CRESTOR  Take 1 tablet by mouth nightly     ticagrelor 90 MG Tabs tablet  Commonly known as: BRILINTA  Take 1 tablet by mouth 2 times daily            CONTINUE taking these medications      amLODIPine 10 MG tablet  Commonly known as: NORVASC  TAKE ONE TABLET BY MOUTH DAILY     Easy Touch Lancets 33G/Twist Misc  USE TO TEST THREE TIMES A DAY     ferrous sulfate 325 (65 Fe) MG EC tablet  Commonly known as: FE TABS 325  TAKE ONE TABLET BY MOUTH TWICE A DAY IN THE MORNING AND AT BEDTIME     fish oil 1000 MG capsule  Take 1 capsule by mouth daily     glipiZIDE 10 MG tablet  Commonly known as: GLUCOTROL  TAKE TWO TABLETS BY MOUTH TWICE A DAY     hydrALAZINE 25 MG tablet  Commonly known as: APRESOLINE  TAKE ONE TABLET BY MOUTH THREE TIMES A DAY     [facility] Glucose Test strip  Generic drug: blood glucose test strips  Test twice daily     losartan 50 MG tablet  Commonly known as: COZAAR  Take 1 tablet by mouth daily     metoprolol succinate 25 MG extended release tablet  Commonly known as: Toprol XL  Take 1 tablet by mouth in the morning. omeprazole 20 MG delayed release capsule  Commonly known as: PRILOSEC  Take 1 capsule by mouth Daily     ONE TOUCH ULTRA 2 w/Device Kit  1 kit by Does not apply route daily     pioglitazone 45 MG tablet  Commonly known as: Actos  Take 1 tablet by mouth daily Dose increased     predniSONE 1 MG tablet  Commonly known as: DELTASONE  Take 4 tablets by mouth daily Dose reduced     tamsulosin 0.4 MG capsule  Commonly known as: FLOMAX  TAKE ONE CAPSULE BY MOUTH DAILY     therapeutic multivitamin-minerals tablet     Vitamin D3 50 MCG (2000 UT) Caps  Take 1 capsule by mouth daily            STOP taking these medications      alogliptin 12.5 MG Tabs tablet  Commonly known as: NESINA     fluconazole 200 MG tablet  Commonly known as: Diflucan     lovastatin 40 MG tablet  Commonly known as: MEVACOR     vitamin C 1000 MG tablet               Where to Get Your Medications        These medications were sent to [facility] [id number] [address]. [place] [phone] Bud Obrien [phone]  [address] [place], [place] [id number]      Phone: [phone]   aspirin 81 MG chewable tablet  rosuvastatin 40 MG tablet  ticagrelor 90 MG Tabs tablet         Follow up 1 week [facility].     Signed:  OMID Romero, SERGIO - CNP on 2/22/2023 at 4:17 PM    The [address], [address] [place] [address]  Phone: [phone]  Fax: [phone]

## 2023-02-22 NOTE — CARE COORDINATION
Case Management Discharge Note          Date / Time of Note: 2/22/2023 4:32 PM                  Patient Name: Addison Goldmann. YOB: 1955  Diagnosis: No admission diagnoses are documented for this encounter. Date / Time: 2/21/2023 11:21 AM    Financial:  Payor: Moses Kowalski / Plan: MEDICARE PART A AND B / Product Type: *No Product type* /      Pharmacy:    Josué Ramirez [id number] [address] Romayne Chapo [phone] - F [phone]  [address] [place] [address]  Phone: [phone] Fax: [phone]      Assistance purchasing medications?: Potential Assistance Purchasing Medications: No  Assistance provided by Case Management: None at this time    DISCHARGE Disposition: Home- No Services Needed    Transportation:  Transportation PLAN for discharge: family   Mode of Transport: Private Car    Additional CM Notes:   Discharge order noted. Patient denied any needs. Family to transport    The Plan for Transition of Care is related to the following treatment goals of No admission diagnoses are documented for this encounter. The Patient and/or patient representative Zulema Ch and his family were provided with a choice of provider and agrees with the discharge plan Not Indicated    Freedom of choice list was provided with basic dialogue that supports the patient's individualized plan of care/goals and shares the quality data associated with the providers.  Not Indicated    Madhavi Donato RN  [address] [address]   Case Management Department  Ph: [phone]

## 2023-02-23 ENCOUNTER — TELEPHONE (OUTPATIENT)
Dept: CARDIOLOGY CLINIC | Age: 68
End: 2023-02-23

## 2023-02-23 ENCOUNTER — TELEPHONE (OUTPATIENT)
Dept: FAMILY MEDICINE CLINIC | Age: 68
End: 2023-02-23

## 2023-02-23 RX ORDER — CLOPIDOGREL BISULFATE 75 MG/1
75 TABLET ORAL DAILY
Qty: 90 TABLET | Refills: 3 | Status: SHIPPED | OUTPATIENT
Start: 2023-02-23

## 2023-02-23 NOTE — TELEPHONE ENCOUNTER
[facility] from [employer] called in this afternoon, she states the generic Plavix  prescribed for patient has interactions with omeprazole and pioglitazone. Any questions she can  be reached at [phone].

## 2023-02-23 NOTE — TELEPHONE ENCOUNTER
Pt called back in again in regards to his new diabetic med and states it is too much and they only have Medicare A & B not Part D. Pt is going to call pharmacy and see if the script can be run through [facility] or something like that and see if it is cheaper. If not pt is going to call back and see if something cheaper can be prescribed. Just and FYI. .. Pt stated he did have blockage so they did put a stent in.

## 2023-02-23 NOTE — TELEPHONE ENCOUNTER
Discussed with Dr. Shaun Pak. Patient may switch to Plavix 75 mg with a loading dose of 300 mg the first day. Called and spoke with patient. He was not discharged with any Brilinta instructed to start Plavix now. Instructed to call if cost prohibitive. He v/u.

## 2023-02-23 NOTE — TELEPHONE ENCOUNTER
Dr. Chris Nava is aware. Okay for patient to take Plavix. [employer] and updated a pharmacist. They v/u and will make a note.

## 2023-02-23 NOTE — TELEPHONE ENCOUNTER
Please advise on below message concerning patients diabetes medication and that it costs too much and a FYI about the stint being placed.

## 2023-02-23 NOTE — TELEPHONE ENCOUNTER
Medication Question/Concern    What is the name of the medication you need to speak with someone about?  ticagrelor (BRILINTA)    Dosage of the medication:  90 MG TABS tablet     How are you taking this medication:  Take 1 tablet by mouth 2 times daily    What issues/concerns are you having with this medication:    Nemesio Sal called stating that the pharmacy is charging him $400.00 for the medication and would like to have something more affordable.     Sumi Osorio callback number: [phone]

## 2023-03-02 ENCOUNTER — OFFICE VISIT (OUTPATIENT)
Dept: CARDIOLOGY CLINIC | Age: 68
End: 2023-03-02
Payer: MEDICARE

## 2023-03-02 VITALS
OXYGEN SATURATION: 98 % | HEIGHT: 67 IN | WEIGHT: 202 LBS | SYSTOLIC BLOOD PRESSURE: 138 MMHG | BODY MASS INDEX: 31.71 KG/M2 | HEART RATE: 102 BPM | DIASTOLIC BLOOD PRESSURE: 78 MMHG | RESPIRATION RATE: 14 BRPM

## 2023-03-02 DIAGNOSIS — I20.0 UNSTABLE ANGINA (HCC): Primary | ICD-10-CM

## 2023-03-02 DIAGNOSIS — E78.2 MIXED HYPERLIPIDEMIA: ICD-10-CM

## 2023-03-02 DIAGNOSIS — I25.10 CORONARY ARTERY DISEASE INVOLVING NATIVE CORONARY ARTERY OF NATIVE HEART WITHOUT ANGINA PECTORIS: ICD-10-CM

## 2023-03-02 DIAGNOSIS — I10 ESSENTIAL HYPERTENSION: ICD-10-CM

## 2023-03-02 PROCEDURE — 3078F DIAST BP <80 MM HG: CPT | Performed by: NURSE PRACTITIONER

## 2023-03-02 PROCEDURE — 1123F ACP DISCUSS/DSCN MKR DOCD: CPT | Performed by: NURSE PRACTITIONER

## 2023-03-02 PROCEDURE — G8417 CALC BMI ABV UP PARAM F/U: HCPCS | Performed by: NURSE PRACTITIONER

## 2023-03-02 PROCEDURE — G8484 FLU IMMUNIZE NO ADMIN: HCPCS | Performed by: NURSE PRACTITIONER

## 2023-03-02 PROCEDURE — 3017F COLORECTAL CA SCREEN DOC REV: CPT | Performed by: NURSE PRACTITIONER

## 2023-03-02 PROCEDURE — G8427 DOCREV CUR MEDS BY ELIG CLIN: HCPCS | Performed by: NURSE PRACTITIONER

## 2023-03-02 PROCEDURE — 1036F TOBACCO NON-USER: CPT | Performed by: NURSE PRACTITIONER

## 2023-03-02 PROCEDURE — 93000 ELECTROCARDIOGRAM COMPLETE: CPT | Performed by: NURSE PRACTITIONER

## 2023-03-02 PROCEDURE — 3075F SYST BP GE 130 - 139MM HG: CPT | Performed by: NURSE PRACTITIONER

## 2023-03-02 PROCEDURE — 99214 OFFICE O/P EST MOD 30 MIN: CPT | Performed by: NURSE PRACTITIONER

## 2023-03-02 NOTE — PROGRESS NOTES
The [address] [place] [address], [address] [place] [address] [address] [address]  [phone]    PrimaryCare Doctor:  David Ovalle DO  Primary Cardiologist: Dr. Cipriano Lesches    Chief Complaint   Patient presents with    Follow-up    Chest Pain     Comes and goes-slight    Coronary Artery Disease         History of Present Illness:  Bobby Rueda is a 69 y.o. male with PMH HTN, DM2, TIA. He was going to PT before admission - he states for weakness and falls     Presented to Cardiology office with chest pain concerning for angina. Dr. Cipriano Lesches performed LHC/ PCI with LICHA to mid LAD 2/21/2022    Patient presents to [facility] cardiology for follow up for CAD. C/O \"a little discomfort\" L upper chest. Desribes as a \"twinge. \"  He has been doing laundry, going up and down steps. C/O fatigue but activity does not bring on the \"twinge. \"    . /78. He checked it at home today on pulse ox- HR was 87. He has been montitoring his BP at home 124/62 today. Endorses that he is nervous about his appointment. R wrist site CDI. He denies any more falls that he was experiencing PTA. Denies SOB, LH,dizzy,syncope, orthopnea, palpitations. Review of Systems:   General: Denies fever, chills, fatigue, weakness  Skin: Denies skin changes, rash, itching, lesions.   HEENT: Denies headache, dizziness, vision changes, nosebleeds, sore throat, nasal drainage  RESP: Denies cough, sputum, dyspnea, wheeze, snoring  CARD: Denies palpitations,  murmur  GI:Denies nausea, vomiting, heartburn, loss of appetite, change in bowels  : Denies frequency, pain, incontinence, polyuria  VASC: Denies claudication, leg cramps, clots  MUSC/SKEL: Denies pain, stiffness, arthritis  PSYCH: Denies anxiety, depression, stress  NEURO: Denies numbness, tingling, weakness,change in mood or memory  HEME: Denies abn bruising, bleeding, anemia  ENDO: Denies intolerance to heat, cold, excessive thirst or hunger, hx thyroid disease    /78   Pulse (!) 102   Resp 14   Ht 5' 7\" (1.702 m)   Wt 202 lb (91.6 kg)   SpO2 98%   BMI 31.64 kg/m²   Wt Readings from Last 3 Encounters:   23 202 lb (91.6 kg)   23 195 lb 5.2 oz (88.6 kg)   23 202 lb (91.6 kg)       Physical Exam:  GEN: Appears well, no acute distress  SKIN: Pink, warm, dry. Nails without clubbing. HEENT: PERRLA. Normocephalic, atraumatic. Neck supple. No adenopathy. LUNG: AP diameter normal. Clear bilateral. No wheeze, rales, or ronchi. Respiratory effort normal.  HEART: S1S2 A/R. No JVD. No carotid bruit. No murmur, rub or gallop. ABD: Large/hiatel hernia, Soft, nontender. +BS X 4 quads. No hepatomegaly. EXT: Radial and pedal pulses 2+ and symmetric. Without varicosities. No edema. MUSCSKEL: Good ROM X4 extremities. No deformity. NEURO: A/O X3. Calm and cooperative. Past Medical History:   has a past medical history of Anemia, Arthritis, Diabetes mellitus ([address] Enlarged prostate, High blood pressure, Hyperlipidemia, Reflux esophagitis, Risk for falls, Tachycardia, Temporal arteritis ([address] and Urinary stone. Surgical History:   has a past surgical history that includes Appendectomy (1984); Cystoscopy (Left, 2019); Colonoscopy (2021); and Parotidectomy (Right, 2022). Social History:   reports that he has never smoked. He has never used smokeless tobacco. He reports that he does not drink alcohol and does not use drugs. Family History:   Family History   Problem Relation Age of Onset    Kidney Disease Mother          from    Diabetes Mother     No Known Problems Father     Cancer Brother         colon       HomeMedications:  Prior to Admission medications    Medication Sig Start Date End Date Taking? Authorizing Provider   clopidogrel (PLAVIX) 75 MG tablet Take 1 tablet by mouth daily The first day you take Plavix, take 4 tablets to equal 300 mg. Then, only take 1 tablet (75 mg) daily.  23  Yes Aramis Esquivel Quita Lei MD   aspirin 81 MG chewable tablet Take 1 tablet by mouth daily 2/23/23  Yes SERGIO Swenson CNP   rosuvastatin (CRESTOR) 40 MG tablet Take 1 tablet by mouth nightly 2/22/23  Yes SERGIO Swenson CNP   amLODIPine (NORVASC) 10 MG tablet TAKE ONE TABLET BY MOUTH DAILY 12/2/22  Yes Cb Curry DO   ferrous sulfate (FE TABS 325) 325 (65 Fe) MG EC tablet TAKE ONE TABLET BY MOUTH TWICE A DAY IN THE MORNING AND AT BEDTIME 10/24/22  Yes Anup Curry DO   glipiZIDE (GLUCOTROL) 10 MG tablet TAKE TWO TABLETS BY MOUTH TWICE A DAY 10/6/22  Yes Cb Curry DO   Easy Touch Lancets 33G/Twist MISC USE TO TEST THREE TIMES A DAY 9/21/22  Yes Fredy Wilson MD   metoprolol succinate (TOPROL XL) 25 MG extended release tablet Take 1 tablet by mouth in the morning.  7/19/22  Yes Cb Curry DO   Cholecalciferol (VITAMIN D3) 50 MCG (2000 UT) CAPS Take 1 capsule by mouth daily 7/12/22  Yes Kinga Curry DO   hydrALAZINE (APRESOLINE) 25 MG tablet TAKE ONE TABLET BY MOUTH THREE TIMES A DAY 6/8/22  Yes SERGIO Sow CNP   pioglitazone (ACTOS) 45 MG tablet Take 1 tablet by mouth daily Dose increased 5/27/22  Yes Kinga Curry DO   blood glucose test strips ([facility]) strip Test twice daily 4/20/22  Yes Fredy Wilson MD   omeprazole (PRILOSEC) 20 MG delayed release capsule Take 1 capsule by mouth Daily 3/28/22  Yes Cb Curry DO   losartan (COZAAR) 50 MG tablet Take 1 tablet by mouth daily 3/18/22  Yes Anup Curry DO   tamsulosin (FLOMAX) 0.4 MG capsule TAKE ONE CAPSULE BY MOUTH DAILY 3/14/22  Yes Anup Curry DO   Blood Glucose Monitoring Suppl (ONE TOUCH ULTRA 2) w/Device KIT 1 kit by Does not apply route daily 4/28/20  Yes Kirti Luke MD   Omega-3 Fatty Acids (FISH OIL) 1000 MG CAPS Take 1 capsule by mouth daily 11/7/19  Yes Kirti Luke MD   Multiple Vitamins-Minerals (THERAPEUTIC MULTIVITAMIN-MINERALS) tablet Take 1 tablet by mouth daily   Yes Historical Provider, MD   lovastatin (MEVACOR) 40 MG tablet Take 1 tablet by mouth nightly 6/24/21   SERGIO Byrne - CNP   Cholecalciferol (VITAMIN D3) 50 MCG (2000 UT) CAPS TAKE ONE CAPSULE BY MOUTH DAILY 12/21/20   Farhan Pope MD   sitaGLIPtan-metformin (JANUMET)  MG per tablet Take 1 tablet by mouth 2 times daily (with meals) 2/3/20   Jill Reynolds MD        Allergies:  Patient has no known allergies. LABS: Results reviewed with patient today.     CBC:   Lab Results   Component Value Date/Time    WBC 9.5 02/22/2023 04:45 AM    WBC 7.5 02/18/2023 10:44 AM    WBC 10.2 02/06/2023 10:22 AM    RBC 4.00 02/22/2023 04:45 AM    RBC 4.53 02/18/2023 10:44 AM    RBC 4.16 02/06/2023 10:22 AM    HGB 12.1 02/22/2023 04:45 AM    HGB 13.5 02/18/2023 10:44 AM    HGB 12.6 02/06/2023 10:22 AM    HCT 35.7 02/22/2023 04:45 AM    HCT 40.9 02/18/2023 10:44 AM    HCT 37.9 02/06/2023 10:22 AM    MCV 89.1 02/22/2023 04:45 AM    MCV 90.3 02/18/2023 10:44 AM    MCV 91.2 02/06/2023 10:22 AM    RDW 14.7 02/22/2023 04:45 AM    RDW 14.5 02/18/2023 10:44 AM    RDW 14.4 02/06/2023 10:22 AM     02/22/2023 04:45 AM     02/18/2023 10:44 AM     02/06/2023 10:22 AM     BMP:  Lab Results   Component Value Date/Time     02/22/2023 04:45 AM     02/18/2023 10:44 AM     02/16/2023 09:24 AM    K 3.8 02/22/2023 04:45 AM    K 4.3 02/18/2023 10:44 AM    K 4.6 02/16/2023 09:24 AM    K 3.8 06/25/2019 05:41 AM    K 3.4 06/24/2019 06:59 AM    K 3.5 06/23/2019 05:29 AM     02/22/2023 04:45 AM     02/18/2023 10:44 AM    CL 99 02/16/2023 09:24 AM    CO2 23 02/22/2023 04:45 AM    CO2 24 02/18/2023 10:44 AM    CO2 24 02/16/2023 09:24 AM    PHOS 3.7 05/16/2022 11:20 AM    PHOS 2.9 05/30/2017 05:22 AM    PHOS 2.7 05/29/2017 04:38 AM    BUN 24 02/22/2023 04:45 AM    BUN 25 02/18/2023 10:44 AM    BUN 33 02/16/2023 09:24 AM CREATININE 1.5 02/22/2023 04:45 AM    CREATININE 1.7 02/18/2023 10:44 AM    CREATININE 1.8 02/16/2023 09:24 AM     BNP:   Lab Results   Component Value Date/Time    PROBNP 128 02/22/2023 04:45 AM       Parameters:   > 450 pg/mL under age 48  > 900 pg/mL ages 54-65  > 1800 pg/mL over age 76    Iron Studies:    Lab Results   Component Value Date/Time    TIBC 279 02/22/2023 04:45 AM    TIBC 353 06/22/2021 11:35 AM    TIBC 280 07/01/2019 12:57 PM    FERRITIN 14.2 02/15/2022 10:55 AM    FERRITIN 27.6 11/30/2021 01:58 PM    FERRITIN 16.0 06/22/2021 11:35 AM     GLUCOSE: No results for input(s): GLUCOSE in the last 72 hours. LIVER PROFILE:   Lab Results   Component Value Date/Time    AST 18 02/06/2023 10:22 AM    ALT 24 02/06/2023 10:22 AM    LABALBU 4.5 02/06/2023 10:22 AM    BILIDIR <0.2 05/16/2022 11:20 AM    BILITOT 0.7 02/06/2023 10:22 AM    ALKPHOS 80 02/06/2023 10:22 AM     PT/INR:   Lab Results   Component Value Date/Time    PROTIME 12.9 06/20/2022 09:12 AM    INR 0.98 06/20/2022 09:12 AM     Cardiac Enzymes:No results found for: CKTOTAL, CKMB, CKMBINDEX, TROPONINI  FASTING LIPID PANEL:  Lab Results   Component Value Date/Time    CHOL 180 02/22/2023 04:45 AM    HDL 36 02/22/2023 04:45 AM    LDLCALC 88 02/22/2023 04:45 AM    TRIG 281 02/22/2023 04:45 AM     TSH: No results found for: TSH    Cardiac Imaging: Reports interpreted by me and reviewed with patient today. EKG: Today,   NSR hx anteriolateral infarct HR 97     Cardiac Angiography:   FINDINGS:  1. Right-dominant coronary arterial circulation. There is a 50% mid  RCA lesion. In the left system, there is no left main disease. There  is a 60% mid circumflex lesion and a 60% lesion in the third obtuse  marginal branch. The left anterior descending artery, there was an  ulcerated mid-LAD lesion with chronic dissection of 60%. This was  intervened upon with a 3.5 x 38 mm [employer] [place] drug-eluting stent  dilated to 3.50 mm in diameter.   There was 0% residual stenosis and TR  3 flow in the vessel at the conclusion of the case. 2.  Normal left ventricular end-diastolic pressure at 8 mmHg. 3.  No gradient across the aortic valve on pullback to suggest aortic  stenosis. ECHO 2/22/2023  Summary  Definity contrast administered to rule out wall motion abnormalities. Left ventricular cavity size is normal.  There is moderately increased left ventricular wall thickness. Overall left ventricular systolic function appears normal.  Ejection fraction is visually estimated to be 55-60%. There are no significant wall motion abnormalities appreciated. Indeterminate diastolic dysfunction. The right ventricle is normal in size and function. There are no significant valvular abnormalities appreciated in this study. CXR 2/22/2023  Impression   No acute process. Stable cardiomegaly. Assessment/Plan:      1.) CAD: S/P LICHA to LAD. Dz to RCA, Cx and OM3 was not intervened on. \"Twinge\" does not sound like angina. HE is tolerating exertion without pain and EKG without ischemia. Cont GDMT. EKG shows hx of anterolateral infarct. ECHO EF 55-60% without RWMA. DAPT: asa, plavix  Beta Blocker: toprol XL  ACEi/ARB:losartan  Anti anginal: NTG PRN  Lipid management/high intensity statin: crestor  Risk factor management: high blood pressure, high cholesterol, Diabetes, smoking, obesity, family hx  Lifestyle modification: Heart healthy diet, regular exercise, weight loss, smoking cessation, stress reduction  Cardiac Rehab: Phase 1 & 2- needs to schedule     2.) Hypertension:   Goal BP <130/80. Met, cont med management.   Non pharmacologic interventions include:   -weight loss  -heart healthy low sodium and low fat diet that consist of mostly fruits, vegetables and grains (Dash diet)  -limited amount of alcohol (no more than 1 drink/day for women, 2 drinks/day for men)  -regular physical activity  -no smoking  -stress reduction     3.) Decreased BS prior to discharge, no known lung hx. Mild SOB when ambulating in [place]. CXR from 2019 with possible pulm edema. CXR 2/22/2023 without acute process. BNP was normal.  Lungs CTA today without C/O SOB.     4.) Hyperlipidemia:   LDL Goal < 70-not met  Statin:  lovastatin changed to crestor   Recheck lipid and liver panel 2 mos         Low cholesterol diet  Liver q6mos  Lipid panel annually    Instructions:     Heart Healthy Diet  Blood pressure control if  you have high blood pressure  Diabetic control if you have diabetes  Smoking cessation if you smoke  Weight loss if BMI >30  Regular exercise when OK per cardiac rehab  Stress Reduction    Call your Doctor if you have:   Increased shortness of breath, weakness, or increased fatigue  A fast or a slow heartbeat  Problems or questions with your medications  Feeling lightheaded or dizzy  Unusual swelling of lower legs/feet, chest discomfort, unusual weakness or fatigue    I appreciate the opportunity of cooperating in the care of this individual.    SERGIO Ramos - CNP, CNP, 3/2/2023,9:57 AM

## 2023-03-02 NOTE — PATIENT INSTRUCTIONS
Call rehab to set up appointment.    Cardiac Rehabilitation - [address] [facility]Leobardo nichols [place] [address]  [phone]

## 2023-03-03 ENCOUNTER — HOSPITAL ENCOUNTER (OUTPATIENT)
Dept: CT IMAGING | Age: 68
Discharge: HOME | End: 2023-03-03
Payer: MEDICARE

## 2023-03-03 DIAGNOSIS — R10.32 LLQ PAIN: ICD-10-CM

## 2023-03-03 PROCEDURE — 74176 CT ABD & PELVIS W/O CONTRAST: CPT

## 2023-03-03 PROCEDURE — 6360000004 HC RX CONTRAST MEDICATION: Performed by: SURGERY

## 2023-03-03 RX ADMIN — IOPAMIDOL 50 ML: 612 INJECTION, SOLUTION INTRAVENOUS at 13:03

## 2023-03-06 ENCOUNTER — TELEPHONE (OUTPATIENT)
Dept: CARDIOLOGY CLINIC | Age: 68
End: 2023-03-06

## 2023-03-06 DIAGNOSIS — I25.10 CORONARY ARTERY DISEASE INVOLVING NATIVE CORONARY ARTERY OF NATIVE HEART WITHOUT ANGINA PECTORIS: Primary | ICD-10-CM

## 2023-03-06 NOTE — TELEPHONE ENCOUNTER
Lacie from [facility] called to have an order for rehab put in the system so they can get him scheduled. Thank you        Lacie

## 2023-03-09 ENCOUNTER — HOSPITAL ENCOUNTER (OUTPATIENT)
Dept: CARDIAC REHAB | Age: 68
Setting detail: THERAPIES SERIES
Discharge: HOME | End: 2023-03-09
Payer: MEDICARE

## 2023-03-09 PROCEDURE — 93798 PHYS/QHP OP CAR RHAB W/ECG: CPT

## 2023-03-10 ENCOUNTER — HOSPITAL ENCOUNTER (OUTPATIENT)
Dept: CARDIAC REHAB | Age: 68
Setting detail: THERAPIES SERIES
Discharge: HOME | End: 2023-03-10
Payer: MEDICARE

## 2023-03-10 LAB
GLUCOSE BLD-MCNC: 196 MG/DL (ref 70–99)
GLUCOSE BLD-MCNC: 228 MG/DL (ref 70–99)
PERFORMED ON: ABNORMAL
PERFORMED ON: ABNORMAL

## 2023-03-10 PROCEDURE — 93798 PHYS/QHP OP CAR RHAB W/ECG: CPT

## 2023-03-13 ENCOUNTER — HOSPITAL ENCOUNTER (OUTPATIENT)
Dept: CARDIAC REHAB | Age: 68
Setting detail: THERAPIES SERIES
Discharge: HOME | End: 2023-03-13
Payer: MEDICARE

## 2023-03-13 PROCEDURE — 93798 PHYS/QHP OP CAR RHAB W/ECG: CPT

## 2023-03-14 LAB
GLUCOSE BLD-MCNC: 293 MG/DL (ref 70–99)
GLUCOSE BLD-MCNC: 296 MG/DL (ref 70–99)
PERFORMED ON: ABNORMAL
PERFORMED ON: ABNORMAL

## 2023-03-15 ENCOUNTER — HOSPITAL ENCOUNTER (OUTPATIENT)
Dept: CARDIAC REHAB | Age: 68
Setting detail: THERAPIES SERIES
Discharge: HOME | End: 2023-03-15
Payer: MEDICARE

## 2023-03-15 PROCEDURE — 93798 PHYS/QHP OP CAR RHAB W/ECG: CPT

## 2023-03-17 ENCOUNTER — HOSPITAL ENCOUNTER (OUTPATIENT)
Dept: CARDIAC REHAB | Age: 68
Setting detail: THERAPIES SERIES
Discharge: HOME | End: 2023-03-17
Payer: MEDICARE

## 2023-03-17 PROCEDURE — 93798 PHYS/QHP OP CAR RHAB W/ECG: CPT

## 2023-03-20 ENCOUNTER — HOSPITAL ENCOUNTER (OUTPATIENT)
Dept: CARDIAC REHAB | Age: 68
Setting detail: THERAPIES SERIES
Discharge: HOME | End: 2023-03-20
Payer: MEDICARE

## 2023-03-20 PROCEDURE — 93798 PHYS/QHP OP CAR RHAB W/ECG: CPT

## 2023-03-20 RX ORDER — ALOGLIPTIN 12.5 MG/1
TABLET, FILM COATED ORAL
Qty: 30 TABLET | Refills: 0 | OUTPATIENT
Start: 2023-03-20

## 2023-03-21 RX ORDER — ALOGLIPTIN 12.5 MG/1
12.5 TABLET, FILM COATED ORAL DAILY
Qty: 30 TABLET | Refills: 11 | Status: SHIPPED | OUTPATIENT
Start: 2023-03-21 | End: 2023-05-19

## 2023-03-22 ENCOUNTER — HOSPITAL ENCOUNTER (OUTPATIENT)
Dept: CARDIAC REHAB | Age: 68
Setting detail: THERAPIES SERIES
Discharge: HOME | End: 2023-03-22
Payer: MEDICARE

## 2023-03-22 PROCEDURE — 93798 PHYS/QHP OP CAR RHAB W/ECG: CPT

## 2023-03-22 RX ORDER — TAMSULOSIN HYDROCHLORIDE 0.4 MG/1
0.4 CAPSULE ORAL DAILY
Qty: 90 CAPSULE | Refills: 3 | Status: SHIPPED | OUTPATIENT
Start: 2023-03-22

## 2023-03-22 RX ORDER — ALOGLIPTIN 12.5 MG/1
TABLET, FILM COATED ORAL
Qty: 30 TABLET | Refills: 0 | OUTPATIENT
Start: 2023-03-22

## 2023-03-22 NOTE — TELEPHONE ENCOUNTER
Patient is calling requesting refills for:    Medication:   Requested Prescriptions     Pending Prescriptions Disp Refills    tamsulosin (FLOMAX) 0.4 MG capsule 90 capsule 3     Sig: Take 1 capsule by mouth daily         Patient Phone Number: [phone] (home)     Last appt: 2/9/2023   Next appt: 5/8/2023    Pharmacy:   [facility] [id number] [address]. [facility] [phone] F [phone]  [address] [place] [address]  Phone: [phone] Fax: [phone]

## 2023-03-24 ENCOUNTER — HOSPITAL ENCOUNTER (OUTPATIENT)
Dept: CARDIAC REHAB | Age: 68
Setting detail: THERAPIES SERIES
Discharge: HOME | End: 2023-03-24
Payer: MEDICARE

## 2023-03-24 PROCEDURE — 93798 PHYS/QHP OP CAR RHAB W/ECG: CPT

## 2023-03-27 ENCOUNTER — HOSPITAL ENCOUNTER (OUTPATIENT)
Dept: CARDIAC REHAB | Age: 68
Setting detail: THERAPIES SERIES
Discharge: HOME | End: 2023-03-27
Payer: MEDICARE

## 2023-03-27 PROCEDURE — 93798 PHYS/QHP OP CAR RHAB W/ECG: CPT

## 2023-03-29 ENCOUNTER — HOSPITAL ENCOUNTER (OUTPATIENT)
Dept: CARDIAC REHAB | Age: 68
Setting detail: THERAPIES SERIES
Discharge: HOME | End: 2023-03-29
Payer: MEDICARE

## 2023-03-29 PROCEDURE — 93798 PHYS/QHP OP CAR RHAB W/ECG: CPT

## 2023-03-31 ENCOUNTER — HOSPITAL ENCOUNTER (OUTPATIENT)
Dept: CARDIAC REHAB | Age: 68
Setting detail: THERAPIES SERIES
Discharge: HOME | End: 2023-03-31
Payer: MEDICARE

## 2023-03-31 PROCEDURE — 93798 PHYS/QHP OP CAR RHAB W/ECG: CPT

## 2023-04-03 ENCOUNTER — HOSPITAL ENCOUNTER (OUTPATIENT)
Dept: CARDIAC REHAB | Age: 68
Setting detail: THERAPIES SERIES
Discharge: HOME | End: 2023-04-03
Payer: MEDICARE

## 2023-04-03 PROCEDURE — 93798 PHYS/QHP OP CAR RHAB W/ECG: CPT

## 2023-04-05 ENCOUNTER — HOSPITAL ENCOUNTER (OUTPATIENT)
Dept: CARDIAC REHAB | Age: 68
Setting detail: THERAPIES SERIES
Discharge: HOME | End: 2023-04-05
Payer: MEDICARE

## 2023-04-05 PROCEDURE — 93798 PHYS/QHP OP CAR RHAB W/ECG: CPT

## 2023-04-07 ENCOUNTER — APPOINTMENT (OUTPATIENT)
Dept: CARDIAC REHAB | Age: 68
End: 2023-04-07
Payer: MEDICARE

## 2023-04-14 ENCOUNTER — APPOINTMENT (OUTPATIENT)
Dept: CARDIAC REHAB | Age: 68
End: 2023-04-14
Payer: MEDICARE

## 2023-04-17 ENCOUNTER — HOSPITAL ENCOUNTER (OUTPATIENT)
Dept: CARDIAC REHAB | Age: 68
Setting detail: THERAPIES SERIES
Discharge: HOME | End: 2023-04-17
Payer: MEDICARE

## 2023-04-17 PROCEDURE — 93798 PHYS/QHP OP CAR RHAB W/ECG: CPT

## 2023-04-18 RX ORDER — LANOLIN ALCOHOL/MO/W.PET/CERES
CREAM (GRAM) TOPICAL
Qty: 180 TABLET | Refills: 1 | Status: SHIPPED | OUTPATIENT
Start: 2023-04-18

## 2023-04-18 NOTE — TELEPHONE ENCOUNTER
Medication:   Requested Prescriptions     Pending Prescriptions Disp Refills    ferrous sulfate (FE TABS 325) 325 (65 Fe) MG EC tablet [Pharmacy Med Name: FERROUS SULF  MG TABLET] 180 tablet 1     Sig: TAKE ONE TABLET BY MOUTH TWICE A DAY IN THE MORNING AND AT BEDTIME       Last Filled:  10/24/2022    Patient Phone Number: [phone] (home)     Last appt: 2/9/2023   Next appt: 5/8/2023

## 2023-04-19 ENCOUNTER — HOSPITAL ENCOUNTER (OUTPATIENT)
Dept: CARDIAC REHAB | Age: 68
Setting detail: THERAPIES SERIES
Discharge: HOME | End: 2023-04-19
Payer: MEDICARE

## 2023-04-19 PROCEDURE — 93798 PHYS/QHP OP CAR RHAB W/ECG: CPT

## 2023-04-21 ENCOUNTER — HOSPITAL ENCOUNTER (OUTPATIENT)
Dept: CARDIAC REHAB | Age: 68
Setting detail: THERAPIES SERIES
Discharge: HOME | End: 2023-04-21
Payer: MEDICARE

## 2023-04-21 PROCEDURE — 93798 PHYS/QHP OP CAR RHAB W/ECG: CPT

## 2023-04-24 ENCOUNTER — HOSPITAL ENCOUNTER (OUTPATIENT)
Dept: CARDIAC REHAB | Age: 68
Setting detail: THERAPIES SERIES
Discharge: HOME | End: 2023-04-24
Payer: MEDICARE

## 2023-04-24 PROCEDURE — 93798 PHYS/QHP OP CAR RHAB W/ECG: CPT

## 2023-04-26 ENCOUNTER — HOSPITAL ENCOUNTER (OUTPATIENT)
Dept: CARDIAC REHAB | Age: 68
Setting detail: THERAPIES SERIES
Discharge: HOME | End: 2023-04-26
Payer: MEDICARE

## 2023-04-26 PROCEDURE — 93798 PHYS/QHP OP CAR RHAB W/ECG: CPT

## 2023-04-26 RX ORDER — ROSUVASTATIN CALCIUM 40 MG/1
TABLET, COATED ORAL
Qty: 90 TABLET | Refills: 3 | Status: SHIPPED | OUTPATIENT
Start: 2023-04-26

## 2023-04-28 ENCOUNTER — HOSPITAL ENCOUNTER (OUTPATIENT)
Dept: CARDIAC REHAB | Age: 68
Setting detail: THERAPIES SERIES
Discharge: HOME | End: 2023-04-28
Payer: MEDICARE

## 2023-04-28 PROCEDURE — 93798 PHYS/QHP OP CAR RHAB W/ECG: CPT

## 2023-05-01 ENCOUNTER — HOSPITAL ENCOUNTER (OUTPATIENT)
Dept: CARDIAC REHAB | Age: 68
Setting detail: THERAPIES SERIES
Discharge: HOME | End: 2023-05-01
Payer: MEDICARE

## 2023-05-01 PROCEDURE — 93798 PHYS/QHP OP CAR RHAB W/ECG: CPT

## 2023-05-03 ENCOUNTER — HOSPITAL ENCOUNTER (OUTPATIENT)
Dept: CARDIAC REHAB | Age: 68
Setting detail: THERAPIES SERIES
Discharge: HOME | End: 2023-05-03
Payer: MEDICARE

## 2023-05-03 PROCEDURE — 93798 PHYS/QHP OP CAR RHAB W/ECG: CPT

## 2023-05-05 ENCOUNTER — HOSPITAL ENCOUNTER (OUTPATIENT)
Dept: CARDIAC REHAB | Age: 68
Setting detail: THERAPIES SERIES
Discharge: HOME | End: 2023-05-05
Payer: MEDICARE

## 2023-05-05 PROCEDURE — 93798 PHYS/QHP OP CAR RHAB W/ECG: CPT

## 2023-05-08 ENCOUNTER — APPOINTMENT (OUTPATIENT)
Dept: CARDIAC REHAB | Age: 68
End: 2023-05-08
Payer: MEDICARE

## 2023-05-08 ENCOUNTER — OFFICE VISIT (OUTPATIENT)
Dept: FAMILY MEDICINE CLINIC | Age: 68
End: 2023-05-08

## 2023-05-08 VITALS
SYSTOLIC BLOOD PRESSURE: 108 MMHG | HEART RATE: 95 BPM | HEIGHT: 67 IN | BODY MASS INDEX: 31.23 KG/M2 | OXYGEN SATURATION: 97 % | WEIGHT: 199 LBS | DIASTOLIC BLOOD PRESSURE: 73 MMHG

## 2023-05-08 DIAGNOSIS — I25.10 CORONARY ARTERY DISEASE INVOLVING NATIVE HEART, UNSPECIFIED VESSEL OR LESION TYPE, UNSPECIFIED WHETHER ANGINA PRESENT: ICD-10-CM

## 2023-05-08 DIAGNOSIS — N18.31 CHRONIC KIDNEY DISEASE, STAGE 3A (HCC): ICD-10-CM

## 2023-05-08 DIAGNOSIS — E11.00 TYPE 2 DIABETES MELLITUS WITH HYPEROSMOLARITY WITHOUT COMA, WITHOUT LONG-TERM CURRENT USE OF INSULIN (HCC): Primary | ICD-10-CM

## 2023-05-08 LAB
ALBUMIN SERPL-MCNC: 4.5 G/DL (ref 3.4–5)
ALP SERPL-CCNC: 69 U/L (ref 40–129)
ALT SERPL-CCNC: 20 U/L (ref 10–40)
ANION GAP SERPL CALCULATED.3IONS-SCNC: 15 MMOL/L (ref 3–16)
AST SERPL-CCNC: 25 U/L (ref 15–37)
BILIRUB DIRECT SERPL-MCNC: <0.2 MG/DL (ref 0–0.3)
BILIRUB INDIRECT SERPL-MCNC: NORMAL MG/DL (ref 0–1)
BILIRUB SERPL-MCNC: 0.5 MG/DL (ref 0–1)
BUN SERPL-MCNC: 26 MG/DL (ref 7–20)
CALCIUM SERPL-MCNC: 9.5 MG/DL (ref 8.3–10.6)
CHLORIDE SERPL-SCNC: 104 MMOL/L (ref 99–110)
CHOLEST SERPL-MCNC: 129 MG/DL (ref 0–199)
CO2 SERPL-SCNC: 21 MMOL/L (ref 21–32)
CREAT SERPL-MCNC: 1.8 MG/DL (ref 0.8–1.3)
GFR SERPLBLD CREATININE-BSD FMLA CKD-EPI: 40 ML/MIN/{1.73_M2}
GLUCOSE SERPL-MCNC: 222 MG/DL (ref 70–99)
HDLC SERPL-MCNC: 35 MG/DL (ref 40–60)
LDLC SERPL CALC-MCNC: 41 MG/DL
PHOSPHATE SERPL-MCNC: 3.4 MG/DL (ref 2.5–4.9)
POTASSIUM SERPL-SCNC: 4.5 MMOL/L (ref 3.5–5.1)
PROT SERPL-MCNC: 6.8 G/DL (ref 6.4–8.2)
SODIUM SERPL-SCNC: 140 MMOL/L (ref 136–145)
TRIGL SERPL-MCNC: 265 MG/DL (ref 0–150)
VLDLC SERPL CALC-MCNC: 53 MG/DL

## 2023-05-08 RX ORDER — PANTOPRAZOLE SODIUM 40 MG/1
40 TABLET, DELAYED RELEASE ORAL
Qty: 90 TABLET | Refills: 3 | Status: SHIPPED | OUTPATIENT
Start: 2023-05-08

## 2023-05-08 NOTE — PROGRESS NOTES
Diagnosis Orders   1. Type 2 diabetes mellitus with hyperosmolarity without coma, without long-term current use of insulin (HCC)  Hemoglobin A1C    Hepatic Function Panel    Renal Function Panel    Lipid Panel    Microalbumin / Creatinine Urine Ratio    C-Peptide      2. Chronic kidney disease, stage 3a (HCC)  Renal Function Panel    Microalbumin / Creatinine Urine Ratio      3. Coronary artery disease involving native heart, unspecified vessel or lesion type, unspecified whether angina present          Diabetes wise, his A1c was uncontrolled, but stable at 8.4 in February, check A1c today, check above lab work as well, patient reports he is up-to-date with his eye exam, he goes to [facility]    CKD wise, his GFR was improved on last check, check microalbumin level today, he reports it has been over a year since he saw nephrology    CAD wise, he is asymptomatic, with his clopidogrel use, will switch omeprazole to pantoprazole    Follow-up in 3 months, or sooner if needed        O: /73   Pulse 95   Ht 5' 7\" (1.702 m)   Wt 199 lb (90.3 kg)   SpO2 97%   BMI 31.17 kg/m²   Gen- NAD, pleasant, looks healthier overall  Neck-supple, no lymphadenopathy appreciated  HEENT- Eyes without icterus or injection  Lungs- CTAB  Heart- RRR  Abdomen- Soft, non tender  Ext- No edema  Psych- Appropriate, euthymic, no si/hi    S: CC-check up    HPI-patient presents for follow-up of diabetes, CAD, chronic kidney disease. He reports he is doing much better status post coronary artery intervention. He has about 17 more cardiac rehab sessions left. He denies dyspnea, chest pain, palpitations. His prednisone was stopped by cardiology. He is concerned about his blood sugars running in the 200s. He reports compliance with alogliptin, glipizide, Actos.     ROS- Per HPI    Patient's medications, allergies, and past medical hx were reviewed     Reed Marcelo DO

## 2023-05-09 LAB
CREAT UR-MCNC: 127.4 MG/DL (ref 39–259)
EST. AVERAGE GLUCOSE BLD GHB EST-MCNC: 208.7 MG/DL
HBA1C MFR BLD: 8.9 %
MICROALBUMIN UR DL<=1MG/L-MCNC: 48 MG/DL
MICROALBUMIN/CREAT UR: 376.8 MG/G (ref 0–30)

## 2023-05-10 ENCOUNTER — HOSPITAL ENCOUNTER (OUTPATIENT)
Dept: CARDIAC REHAB | Age: 68
Setting detail: THERAPIES SERIES
Discharge: HOME | End: 2023-05-10
Payer: MEDICARE

## 2023-05-10 LAB — C PEPTIDE SERPL-MCNC: 8.7 NG/ML (ref 1.1–4.4)

## 2023-05-10 PROCEDURE — 93798 PHYS/QHP OP CAR RHAB W/ECG: CPT

## 2023-05-12 ENCOUNTER — HOSPITAL ENCOUNTER (OUTPATIENT)
Dept: CARDIAC REHAB | Age: 68
Setting detail: THERAPIES SERIES
Discharge: HOME | End: 2023-05-12
Payer: MEDICARE

## 2023-05-12 PROCEDURE — 93798 PHYS/QHP OP CAR RHAB W/ECG: CPT

## 2023-05-15 ENCOUNTER — HOSPITAL ENCOUNTER (OUTPATIENT)
Dept: CARDIAC REHAB | Age: 68
Setting detail: THERAPIES SERIES
Discharge: HOME | End: 2023-05-15
Payer: MEDICARE

## 2023-05-15 PROCEDURE — 93798 PHYS/QHP OP CAR RHAB W/ECG: CPT

## 2023-05-17 ENCOUNTER — HOSPITAL ENCOUNTER (OUTPATIENT)
Dept: CARDIAC REHAB | Age: 68
Setting detail: THERAPIES SERIES
Discharge: HOME | End: 2023-05-17
Payer: MEDICARE

## 2023-05-17 PROCEDURE — 93798 PHYS/QHP OP CAR RHAB W/ECG: CPT

## 2023-05-19 ENCOUNTER — TELEPHONE (OUTPATIENT)
Dept: FAMILY MEDICINE CLINIC | Age: 68
End: 2023-05-19

## 2023-05-19 ENCOUNTER — HOSPITAL ENCOUNTER (OUTPATIENT)
Dept: CARDIAC REHAB | Age: 68
Setting detail: THERAPIES SERIES
Discharge: HOME | End: 2023-05-19
Payer: MEDICARE

## 2023-05-19 DIAGNOSIS — E11.65 UNCONTROLLED TYPE 2 DIABETES MELLITUS WITH HYPERGLYCEMIA (HCC): Primary | ICD-10-CM

## 2023-05-19 PROCEDURE — 93798 PHYS/QHP OP CAR RHAB W/ECG: CPT

## 2023-05-22 ENCOUNTER — HOSPITAL ENCOUNTER (OUTPATIENT)
Dept: CARDIAC REHAB | Age: 68
Setting detail: THERAPIES SERIES
Discharge: HOME | End: 2023-05-22
Payer: MEDICARE

## 2023-05-22 PROCEDURE — 93798 PHYS/QHP OP CAR RHAB W/ECG: CPT

## 2023-05-24 ENCOUNTER — TELEPHONE (OUTPATIENT)
Dept: FAMILY MEDICINE CLINIC | Age: 68
End: 2023-05-24

## 2023-05-24 ENCOUNTER — HOSPITAL ENCOUNTER (OUTPATIENT)
Dept: CARDIAC REHAB | Age: 68
Setting detail: THERAPIES SERIES
Discharge: HOME | End: 2023-05-24
Payer: MEDICARE

## 2023-05-24 PROCEDURE — 93798 PHYS/QHP OP CAR RHAB W/ECG: CPT

## 2023-05-26 ENCOUNTER — HOSPITAL ENCOUNTER (OUTPATIENT)
Dept: CARDIAC REHAB | Age: 68
Setting detail: THERAPIES SERIES
Discharge: HOME | End: 2023-05-26
Payer: MEDICARE

## 2023-05-26 PROCEDURE — 93798 PHYS/QHP OP CAR RHAB W/ECG: CPT

## 2023-05-29 ENCOUNTER — APPOINTMENT (OUTPATIENT)
Dept: CARDIAC REHAB | Age: 68
End: 2023-05-29
Payer: MEDICARE

## 2023-05-31 ENCOUNTER — OFFICE VISIT (OUTPATIENT)
Dept: PHARMACY | Age: 68
End: 2023-05-31
Payer: MEDICARE

## 2023-05-31 ENCOUNTER — HOSPITAL ENCOUNTER (OUTPATIENT)
Dept: CARDIAC REHAB | Age: 68
Setting detail: THERAPIES SERIES
Discharge: HOME | End: 2023-05-31
Payer: MEDICARE

## 2023-05-31 DIAGNOSIS — E11.65 UNCONTROLLED TYPE 2 DIABETES MELLITUS WITH HYPERGLYCEMIA (HCC): Primary | ICD-10-CM

## 2023-05-31 PROCEDURE — 93798 PHYS/QHP OP CAR RHAB W/ECG: CPT

## 2023-05-31 PROCEDURE — 99214 OFFICE O/P EST MOD 30 MIN: CPT

## 2023-05-31 RX ORDER — TETRACYCLINE HCL 500 MG
1 CAPSULE ORAL DAILY
COMMUNITY

## 2023-06-02 ENCOUNTER — HOSPITAL ENCOUNTER (OUTPATIENT)
Dept: CARDIAC REHAB | Age: 68
Setting detail: THERAPIES SERIES
Discharge: HOME | End: 2023-06-02
Payer: MEDICARE

## 2023-06-02 DIAGNOSIS — E78.2 MIXED HYPERLIPIDEMIA: ICD-10-CM

## 2023-06-02 LAB
ALBUMIN SERPL-MCNC: 4.7 G/DL (ref 3.4–5)
ALP SERPL-CCNC: 78 U/L (ref 40–129)
ALT SERPL-CCNC: 17 U/L (ref 10–40)
AST SERPL-CCNC: 21 U/L (ref 15–37)
BILIRUB DIRECT SERPL-MCNC: <0.2 MG/DL (ref 0–0.3)
BILIRUB INDIRECT SERPL-MCNC: NORMAL MG/DL (ref 0–1)
BILIRUB SERPL-MCNC: 0.8 MG/DL (ref 0–1)
CHOLEST SERPL-MCNC: 135 MG/DL (ref 0–199)
HDLC SERPL-MCNC: 43 MG/DL (ref 40–60)
LDLC SERPL CALC-MCNC: 40 MG/DL
PROT SERPL-MCNC: 7.3 G/DL (ref 6.4–8.2)
TRIGL SERPL-MCNC: 261 MG/DL (ref 0–150)
VLDLC SERPL CALC-MCNC: 52 MG/DL

## 2023-06-02 PROCEDURE — 93798 PHYS/QHP OP CAR RHAB W/ECG: CPT

## 2023-06-02 RX ORDER — PIOGLITAZONEHYDROCHLORIDE 45 MG/1
TABLET ORAL
Qty: 90 TABLET | Refills: 3 | Status: SHIPPED | OUTPATIENT
Start: 2023-06-02

## 2023-06-02 NOTE — TELEPHONE ENCOUNTER
Medication:   Requested Prescriptions     Pending Prescriptions Disp Refills    pioglitazone (ACTOS) 45 MG tablet [Pharmacy Med Name: PIOGLITAZONE HCL 45 MG TABLET] 90 tablet 3     Sig: TAKE ONE TABLET BY MOUTH DAILY       Last Filled:  5/27/2022    Patient Phone Number: [phone] (home)     Last appt: 5/8/2023   Next appt: 8/8/2023

## 2023-06-07 ENCOUNTER — OFFICE VISIT (OUTPATIENT)
Dept: PHARMACY | Age: 68
End: 2023-06-07
Payer: MEDICARE

## 2023-06-07 VITALS
OXYGEN SATURATION: 95 % | SYSTOLIC BLOOD PRESSURE: 129 MMHG | DIASTOLIC BLOOD PRESSURE: 75 MMHG | BODY MASS INDEX: 31.25 KG/M2 | WEIGHT: 199.5 LBS | HEART RATE: 92 BPM

## 2023-06-07 DIAGNOSIS — E11.65 UNCONTROLLED TYPE 2 DIABETES MELLITUS WITH HYPERGLYCEMIA (HCC): Primary | ICD-10-CM

## 2023-06-07 PROCEDURE — 99213 OFFICE O/P EST LOW 20 MIN: CPT

## 2023-06-07 NOTE — PROGRESS NOTES
patterns. Patient's current eating patterns:   5/31/2023  -3 meals per day. - Breakfast - bran flakes, black coffee  - Lunch - hamburger or chicken salad, sometimes potato chips  - Dinner - steak, potato and vegetable. - Snacks - granola bars  - Beverages - black coffee, if pop drinks diet, BOOST daily    6/7/2023  - changed lunch to salads  - changed snacks to low sodium nuts, celery or carrots. - stopped Boost   - Stopped granola bars. - no sugary beverages     Physical current activity:   - completed PT, no regular activity at this time. Weight management plan:   - encouraged weight loss, no specific plan at this time. General Diabetes Education:   Weight loss  Physical Activity with a goal of 30 minutes 5 times a week  Healthy eating patterns, My Plate Method of Eating, and Carbohydrate Counting  Management of low and high blood glucose readings  Blood pressure control with a goal of < 130/80  Possibility of insulin. He is open to this. Showed him an example of an insulin pen. Materials given:  none    - Current Smoking Assessment:   Non-smoker    Physician Follow-up for Diabetes: Yes, Dr. Sonia Burton August    Further Assessment / Plan     Diabetes Medication Changes and or Recommendations Made Today:     Continue your diabetes medications:  Pioglitazone  glipizide  Continue to limit foods that are high in carbohydrates like potato chips. Work to keep your carbohydrates at 45-60 grams per meal  Continue to check your blood sugar every morning. I sent a message to Dr. Sonia Burton to see about adding Jardiance. I don't think the message was sent last visit. Schedule a follow up with your kidney doctor.     Choose healthy eating patterns:  - Eliminate sugary beverages  - Keep carbohydrates within the recommended amounts   - 45-60 grams (3-4 servings) per meal for women   - 60-75 grams (4-5 servings) per meal for men  - Carbohydrate counting  - My Plate eating pattern  -

## 2023-06-07 NOTE — PATIENT INSTRUCTIONS
Continue your diabetes medications:  Pioglitazone  glipizide  Continue to limit foods that are high in carbohydrates like potato chips. Work to keep your carbohydrates at 45-60 grams per meal  Continue to check your blood sugar every morning. I sent a message to Dr. Pao Trotter to see about adding Jardiance. Schedule a follow up with your kidney doctor.

## 2023-06-09 ENCOUNTER — TELEPHONE (OUTPATIENT)
Dept: PHARMACY | Age: 68
End: 2023-06-09

## 2023-06-09 RX ORDER — INSULIN GLARGINE 100 [IU]/ML
10 INJECTION, SOLUTION SUBCUTANEOUS NIGHTLY
Qty: 5 ADJUSTABLE DOSE PRE-FILLED PEN SYRINGE | Refills: 0 | Status: SHIPPED | OUTPATIENT
Start: 2023-06-09

## 2023-06-09 NOTE — TELEPHONE ENCOUNTER
Had reached out to Dr. Akila Kahn to see about adding Jardiance. She reported they tried and it was too costly. Agrees with Lantus nightly. Sent order for her approval.     Will see him again 6/21. I called and told him to expect it at his pharmacy.     Orders Placed This Encounter   Medications    insulin glargine (LANTUS SOLOSTAR) 100 UNIT/ML injection pen     Sig: Inject 10 Units into the skin nightly     Dispense:  5 Adjustable Dose Pre-filled Pen Syringe     Refill:  0   E-Prescribing Status: Sent to pharmacy (6/9/2023  4:46 PM EDT)    Marva PeresD  [facility]  Diabetes Service  [phone]    For Pharmacy Admin Tracking Only    Program: Medication Management  CPA in place:  Yes  Recommendation Provided To: Pharmacy: 1  Intervention Detail: New Rx: 1, reason: Needs Additional Therapy  Intervention Accepted By: Pharmacy: 1  Gap Closed?: Yes   Time Spent (min): 15

## 2023-06-21 ENCOUNTER — OFFICE VISIT (OUTPATIENT)
Dept: PHARMACY | Age: 68
End: 2023-06-21
Payer: MEDICARE

## 2023-06-21 VITALS
DIASTOLIC BLOOD PRESSURE: 74 MMHG | BODY MASS INDEX: 30.35 KG/M2 | SYSTOLIC BLOOD PRESSURE: 115 MMHG | WEIGHT: 193.8 LBS | OXYGEN SATURATION: 96 % | HEART RATE: 93 BPM

## 2023-06-21 DIAGNOSIS — E11.65 UNCONTROLLED TYPE 2 DIABETES MELLITUS WITH HYPERGLYCEMIA (HCC): Primary | ICD-10-CM

## 2023-06-21 PROCEDURE — 99213 OFFICE O/P EST LOW 20 MIN: CPT

## 2023-06-21 NOTE — PATIENT INSTRUCTIONS
Increase the Lantus to 15 units every night starting tonight  Continue your diabetes medications:  Pioglitazone  Glipizide  3. Continue to limit foods that are high in carbohydrates like potato chips. 4. Work to keep your carbohydrates at 45-60 grams per meal  5. Continue to check your blood sugar every morning. 6. Work to increase your activity at home. Goal is 30 minutes 5 times a week  7. Continue your iron medication.

## 2023-06-21 NOTE — PROGRESS NOTES
[facility]  Pharmacy  Diabetes Service    [address], [address]  Phone: [phone]  Fax: [phone]    NAME: Obdulia Garcia. MEDICAL RECORD NUMBER:  [id number]  AGE: 69 y.o. GENDER: male  : 1955  EPISODE DATE:  2023       Savannah Henry was referred to the [facility] by  Dr. Lurdes Dorado  for Diabetes services. Special Instructions per the Referral Include: Patient Education and Medication Management      ICD-10-CM    1. Uncontrolled type 2 diabetes mellitus with hyperglycemia (HCC)  E11.65         Referral goals: No diabetes goals were included on the referral     If diabetes goals are not included on the referral, ADA guidelines will be used. Carolina Guerin is a 69 y.o. here for the Diabetes Service for self-management education, medication review including over the counter medications and herbal products, overall wellbeing assessment, transition of care and any needed adjustments with updates and recommendations communicated to the referring physician. Patient findings:     Hyperglycemia Symptoms:    Polyuria  Vision changes     Denies polydipsia  Denies polyphagia  Denies fatigue  Denies symptoms of neuropathy  Denies foot ulcerations  Denies nausea  Denies vomiting  Denies slow to heal    Vision seems a little worse recently - same ()  Fatigue has resolved    Hypoglycemia Symptoms:   none    Other general findings: none    Recent hospitalization:   none    - Date of diabetes diagnosis: ~    Ongoing factors affecting care:   - follows with [facility]  - follows with podiatry every 6-9 months  - ASCVD (CAD w hx LICHA to LAD)  - CKD  - alogliptin > $200  - Metformin was stopped - he reports he was told \"it messes with his kidneys\"  - took Janumet at one point and it was free, but was stopped likely due to the Metformin.   - SGLT2I not affordable.      Objective   There were no

## 2023-06-28 ENCOUNTER — OFFICE VISIT (OUTPATIENT)
Dept: PHARMACY | Age: 68
End: 2023-06-28
Payer: MEDICARE

## 2023-06-28 VITALS
OXYGEN SATURATION: 96 % | HEART RATE: 92 BPM | DIASTOLIC BLOOD PRESSURE: 61 MMHG | SYSTOLIC BLOOD PRESSURE: 103 MMHG | BODY MASS INDEX: 29.55 KG/M2 | WEIGHT: 188.7 LBS

## 2023-06-28 DIAGNOSIS — E11.65 UNCONTROLLED TYPE 2 DIABETES MELLITUS WITH HYPERGLYCEMIA (HCC): Primary | ICD-10-CM

## 2023-06-28 PROCEDURE — 99213 OFFICE O/P EST LOW 20 MIN: CPT

## 2023-06-28 RX ORDER — INSULIN GLARGINE 100 [IU]/ML
17 INJECTION, SOLUTION SUBCUTANEOUS NIGHTLY
Qty: 2 ADJUSTABLE DOSE PRE-FILLED PEN SYRINGE | Refills: 2 | Status: SHIPPED | OUTPATIENT
Start: 2023-06-28

## 2023-07-05 ENCOUNTER — OFFICE VISIT (OUTPATIENT)
Dept: PHARMACY | Age: 68
End: 2023-07-05
Payer: MEDICARE

## 2023-07-05 VITALS — WEIGHT: 198 LBS | BODY MASS INDEX: 31.01 KG/M2

## 2023-07-05 DIAGNOSIS — E11.65 UNCONTROLLED TYPE 2 DIABETES MELLITUS WITH HYPERGLYCEMIA (HCC): Primary | ICD-10-CM

## 2023-07-05 PROCEDURE — 99213 OFFICE O/P EST LOW 20 MIN: CPT

## 2023-07-05 NOTE — PATIENT INSTRUCTIONS
Increase the Lantus to 18 units every night starting tonight  Continue your diabetes medications:  Pioglitazone  Glipizide  Continue to limit foods that are high in carbohydrates like potato chips. Work to keep your carbohydrates at 45-60 grams per meal  Continue to check your blood sugar every morning. Continue to walk 30 minutes daily. Continue your iron medication until your doctor instructs you differently. Continue to work to lose weight. Please call me if you start getting blood sugars below 80.

## 2023-07-10 RX ORDER — METOPROLOL SUCCINATE 25 MG/1
TABLET, EXTENDED RELEASE ORAL
Qty: 90 TABLET | Refills: 3 | Status: SHIPPED | OUTPATIENT
Start: 2023-07-10

## 2023-07-10 NOTE — TELEPHONE ENCOUNTER
Medication:   Requested Prescriptions     Pending Prescriptions Disp Refills    metoprolol succinate (TOPROL XL) 25 MG extended release tablet [Pharmacy Med Name: METOPROLOL SUCC ER 25 MG TAB] 90 tablet 3     Sig: TAKE ONE TABLET BY MOUTH EVERY MORNING        Last Filled:  07/19/2022    Patient Phone Number: [phone] (home)     Last appt: 5/8/2023   Next appt: 8/8/2023    Last OARRS: No flowsheet data found.

## 2023-08-08 ENCOUNTER — OFFICE VISIT (OUTPATIENT)
Dept: FAMILY MEDICINE CLINIC | Age: 68
End: 2023-08-08

## 2023-08-08 VITALS
SYSTOLIC BLOOD PRESSURE: 114 MMHG | HEIGHT: 67 IN | DIASTOLIC BLOOD PRESSURE: 72 MMHG | HEART RATE: 82 BPM | OXYGEN SATURATION: 95 % | BODY MASS INDEX: 31.39 KG/M2 | WEIGHT: 200 LBS

## 2023-08-08 DIAGNOSIS — E78.5 DYSLIPIDEMIA: ICD-10-CM

## 2023-08-08 DIAGNOSIS — I25.10 CORONARY ARTERY DISEASE INVOLVING NATIVE HEART, UNSPECIFIED VESSEL OR LESION TYPE, UNSPECIFIED WHETHER ANGINA PRESENT: ICD-10-CM

## 2023-08-08 DIAGNOSIS — N18.9 CHRONIC KIDNEY DISEASE, UNSPECIFIED CKD STAGE: ICD-10-CM

## 2023-08-08 DIAGNOSIS — E11.65 UNCONTROLLED TYPE 2 DIABETES MELLITUS WITH HYPERGLYCEMIA (HCC): Primary | ICD-10-CM

## 2023-08-08 RX ORDER — BLOOD-GLUCOSE METER
EACH MISCELLANEOUS
Qty: 100 STRIP | Refills: 11 | Status: SHIPPED | OUTPATIENT
Start: 2023-08-08

## 2023-08-11 ENCOUNTER — TELEPHONE (OUTPATIENT)
Dept: FAMILY MEDICINE CLINIC | Age: 68
End: 2023-08-11

## 2023-08-15 RX ORDER — PANTOPRAZOLE SODIUM 40 MG/1
TABLET, DELAYED RELEASE ORAL
Qty: 90 TABLET | Refills: 3 | Status: SHIPPED | OUTPATIENT
Start: 2023-08-15
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
## 2023-08-15 NOTE — TELEPHONE ENCOUNTER
Medication:   Requested Prescriptions     Pending Prescriptions Disp Refills    pantoprazole (PROTONIX) 40 MG tablet [Pharmacy Med Name: PANTOPRAZOLE SOD DR 40 MG TAB] 90 tablet 3     Sig: TAKE ONE TABLET BY MOUTH EVERY MORNING BEFORE BREAKFAST       Last Filled:  5/8/2023    Patient Phone Number: [phone] (home)     Last appt: 8/8/2023   Next appt: 12/8/2023

## 2023-08-16 ENCOUNTER — TELEPHONE (OUTPATIENT)
Dept: FAMILY MEDICINE CLINIC | Age: 68
End: 2023-08-16

## 2023-08-16 ENCOUNTER — OFFICE VISIT (OUTPATIENT)
Dept: PHARMACY | Age: 68
End: 2023-08-16
Payer: MEDICARE

## 2023-08-16 VITALS
BODY MASS INDEX: 31.79 KG/M2 | HEART RATE: 73 BPM | WEIGHT: 203 LBS | DIASTOLIC BLOOD PRESSURE: 61 MMHG | SYSTOLIC BLOOD PRESSURE: 108 MMHG | OXYGEN SATURATION: 96 %

## 2023-08-16 DIAGNOSIS — E11.65 UNCONTROLLED TYPE 2 DIABETES MELLITUS WITH HYPERGLYCEMIA (HCC): Primary | ICD-10-CM

## 2023-08-16 PROCEDURE — 99214 OFFICE O/P EST MOD 30 MIN: CPT

## 2023-08-16 NOTE — PATIENT INSTRUCTIONS
Continue Lantus 18 units every night   Continue your diabetes medications:  Pioglitazone  Glipizide  Continue to limit foods that are high in carbohydrates like potato chips. Work to keep your carbohydrates at 45-60 grams per meal  Continue to check your blood sugar every morning. Continue to walk 30 minutes daily. Continue your iron medication until your doctor instructs you differently. Continue to work to lose weight. Please call me if you start getting blood sugars below 80. Let us know if your sugars increase above 250.

## 2023-08-16 NOTE — TELEPHONE ENCOUNTER
Pt called stating he never received a call about his a1c from his appt on 8/8. I did not see where he had an a1c. I only saw active orders. Pt would like a call back.  Pt is reachable at [phone]

## 2023-08-28 DIAGNOSIS — M25.512 SHOULDER PAIN, LEFT: ICD-10-CM

## 2023-08-28 DIAGNOSIS — M75.20 BICEPS TENDONITIS: ICD-10-CM

## 2023-08-28 RX ORDER — AMLODIPINE BESYLATE 10 MG/1
TABLET ORAL
Qty: 90 TABLET | Refills: 2 | Status: SHIPPED | OUTPATIENT
Start: 2023-08-28

## 2023-08-28 NOTE — TELEPHONE ENCOUNTER
Medication:   Requested Prescriptions     Pending Prescriptions Disp Refills    amLODIPine (NORVASC) 10 MG tablet [Pharmacy Med Name: amLODIPine BESYLATE 10 MG TAB] 90 tablet 2     Sig: TAKE ONE TABLET BY MOUTH DAILY        Last Filled:  12/02/2022    Patient Phone Number: [phone] (home)     Last appt: 8/8/2023   Next appt: 12/8/2023    Last OARRS: No flowsheet data found.

## 2023-08-29 ENCOUNTER — OFFICE VISIT (OUTPATIENT)
Dept: CARDIOLOGY CLINIC | Age: 68
End: 2023-08-29
Payer: MEDICARE

## 2023-08-29 VITALS
BODY MASS INDEX: 31.86 KG/M2 | DIASTOLIC BLOOD PRESSURE: 74 MMHG | WEIGHT: 203 LBS | OXYGEN SATURATION: 96 % | HEART RATE: 92 BPM | SYSTOLIC BLOOD PRESSURE: 116 MMHG | HEIGHT: 67 IN

## 2023-08-29 DIAGNOSIS — R20.0 NUMBNESS AND TINGLING IN LEFT ARM: ICD-10-CM

## 2023-08-29 DIAGNOSIS — I25.10 CORONARY ARTERY DISEASE INVOLVING NATIVE CORONARY ARTERY OF NATIVE HEART WITHOUT ANGINA PECTORIS: Primary | ICD-10-CM

## 2023-08-29 DIAGNOSIS — R20.2 NUMBNESS AND TINGLING IN LEFT ARM: ICD-10-CM

## 2023-08-29 PROCEDURE — G8427 DOCREV CUR MEDS BY ELIG CLIN: HCPCS | Performed by: NURSE PRACTITIONER

## 2023-08-29 PROCEDURE — 99214 OFFICE O/P EST MOD 30 MIN: CPT | Performed by: NURSE PRACTITIONER

## 2023-08-29 PROCEDURE — G8417 CALC BMI ABV UP PARAM F/U: HCPCS | Performed by: NURSE PRACTITIONER

## 2023-08-29 PROCEDURE — 93000 ELECTROCARDIOGRAM COMPLETE: CPT | Performed by: NURSE PRACTITIONER

## 2023-08-29 PROCEDURE — 3017F COLORECTAL CA SCREEN DOC REV: CPT | Performed by: NURSE PRACTITIONER

## 2023-08-29 PROCEDURE — 3074F SYST BP LT 130 MM HG: CPT | Performed by: NURSE PRACTITIONER

## 2023-08-29 PROCEDURE — 1123F ACP DISCUSS/DSCN MKR DOCD: CPT | Performed by: NURSE PRACTITIONER

## 2023-08-29 PROCEDURE — 3078F DIAST BP <80 MM HG: CPT | Performed by: NURSE PRACTITIONER

## 2023-08-29 PROCEDURE — 1036F TOBACCO NON-USER: CPT | Performed by: NURSE PRACTITIONER

## 2023-08-29 NOTE — PROGRESS NOTES
[facility], [address] [facility] [address], [address] [place] [address]  [phone]    PrimaryCare Doctor:  Frankey Flowers, DO  Primary Cardiologist: Dr. Lluvia Perry    Chief Complaint   Patient presents with    6 Month Follow-Up     Tingling and numbness in lower left arm and hand. History of Present Illness:  Clarissa Peter is a 69 y.o. male with PMH HTN, DM2, TIA. Hx weakness and falls s/p PT/OT     Presented to Cardiology office with chest pain concerning for angina. Dr. Lluvia Perry performed LHC/ PCI with LICHA to mid LAD 2/21/2022    Patient presents to [facility] cardiology for follow up for CAD. C/O \"trouble with circulation especially with my feft arm\"  Onset ~ 2 weeks ago. Feels \"tingling\" from fingers to elbow at rest intermittently. Occurs 2-3 times a day. He thinks it is positional. Worse when he wakes up, gets better throughout the day. Left hand occasionally feels stiff, especially in the morning. Stiffness and tingling improve with movement. He does not have unitlateral weakness, change in vision, difficult with speech or other S/S of CVA/TIA. He walks for 30min almost daily. He pushes his wife in wheelchair into appt. Denies CP, SOB, LH, syncope, palpitations with exertion. Review of Systems:   General: Denies fever, chills, fatigue, weakness  Skin: Denies skin changes, rash, itching, lesions.   HEENT: Denies headache, dizziness, vision changes, nosebleeds, sore throat, nasal drainage  RESP: Denies cough, sputum, dyspnea, wheeze, snoring  CARD: Denies palpitations,  murmur  GI:Denies nausea, vomiting, heartburn, loss of appetite, change in bowels  : Denies frequency, pain, incontinence, polyuria  VASC: Denies claudication, leg cramps, clots  MUSC/SKEL: Denies pain, stiffness, arthritis  PSYCH: Denies anxiety, depression, stress  NEURO: Denies numbness, tingling, weakness,change in mood or memory  HEME: Denies abn bruising, bleeding, anemia  ENDO:

## 2023-09-11 ENCOUNTER — HOSPITAL ENCOUNTER (OUTPATIENT)
Dept: GENERAL RADIOLOGY | Age: 68
Discharge: HOME | End: 2023-09-11
Payer: MEDICARE

## 2023-09-11 ENCOUNTER — OFFICE VISIT (OUTPATIENT)
Dept: FAMILY MEDICINE CLINIC | Age: 68
End: 2023-09-11

## 2023-09-11 ENCOUNTER — HOSPITAL ENCOUNTER (OUTPATIENT)
Age: 68
Discharge: HOME | End: 2023-09-11
Payer: MEDICARE

## 2023-09-11 VITALS
SYSTOLIC BLOOD PRESSURE: 119 MMHG | DIASTOLIC BLOOD PRESSURE: 70 MMHG | HEART RATE: 83 BPM | TEMPERATURE: 97.3 F | WEIGHT: 206 LBS | OXYGEN SATURATION: 98 % | BODY MASS INDEX: 32.33 KG/M2 | HEIGHT: 67 IN

## 2023-09-11 DIAGNOSIS — R20.0 LEFT ARM NUMBNESS: ICD-10-CM

## 2023-09-11 DIAGNOSIS — R20.0 LEFT ARM NUMBNESS: Primary | ICD-10-CM

## 2023-09-11 PROCEDURE — 72050 X-RAY EXAM NECK SPINE 4/5VWS: CPT

## 2023-09-11 RX ORDER — PIOGLITAZONEHYDROCHLORIDE 45 MG/1
45 TABLET ORAL DAILY
Qty: 90 TABLET | Refills: 3 | Status: SHIPPED | OUTPATIENT
Start: 2023-09-11

## 2023-10-04 RX ORDER — GLIPIZIDE 10 MG/1
TABLET ORAL
Qty: 360 TABLET | Refills: 1 | Status: SHIPPED | OUTPATIENT
Start: 2023-10-04

## 2023-10-04 NOTE — TELEPHONE ENCOUNTER
Medication:   Requested Prescriptions     Pending Prescriptions Disp Refills    glipiZIDE (GLUCOTROL) 10 MG tablet [Pharmacy Med Name: glipiZIDE 10 MG TABLET] 360 tablet 1     Sig: TAKE TWO TABLETS BY MOUTH TWICE A DAY        Last Filled:  04/10/2023    Patient Phone Number: [phone] (home)     Last appt: 9/11/2023   Next appt: 12/8/2023    Last OARRS:        No data to display

## 2023-10-13 RX ORDER — LANOLIN ALCOHOL/MO/W.PET/CERES
CREAM (GRAM) TOPICAL
Qty: 180 TABLET | Refills: 1 | Status: SHIPPED | OUTPATIENT
Start: 2023-10-13

## 2023-10-13 NOTE — TELEPHONE ENCOUNTER
Medication:   Requested Prescriptions     Pending Prescriptions Disp Refills    ferrous sulfate (FE TABS 325) 325 (65 Fe) MG EC tablet [Pharmacy Med Name: FERROUS SULF  MG TABLET] 180 tablet 1     Sig: TAKE ONE TABLET BY MOUTH TWICE A DAY.  ONCE IN THE MORNING AND ONCE AT BEDTIME        Last Filled:  04/18/2023    Patient Phone Number: [phone] (home)     Last appt: 9/11/2023   Next appt: 12/8/2023    Last OARRS:        No data to display

## 2023-12-08 ENCOUNTER — OFFICE VISIT (OUTPATIENT)
Dept: FAMILY MEDICINE CLINIC | Age: 68
End: 2023-12-08

## 2023-12-08 VITALS
BODY MASS INDEX: 32.55 KG/M2 | SYSTOLIC BLOOD PRESSURE: 130 MMHG | HEIGHT: 67 IN | HEART RATE: 77 BPM | DIASTOLIC BLOOD PRESSURE: 64 MMHG | WEIGHT: 207.4 LBS | TEMPERATURE: 98.6 F | OXYGEN SATURATION: 96 %

## 2023-12-08 DIAGNOSIS — E11.00 TYPE 2 DIABETES MELLITUS WITH HYPEROSMOLARITY WITHOUT COMA, WITHOUT LONG-TERM CURRENT USE OF INSULIN (HCC): ICD-10-CM

## 2023-12-08 DIAGNOSIS — I25.10 CORONARY ARTERY DISEASE INVOLVING NATIVE HEART, UNSPECIFIED VESSEL OR LESION TYPE, UNSPECIFIED WHETHER ANGINA PRESENT: ICD-10-CM

## 2023-12-08 DIAGNOSIS — R20.2 PARESTHESIAS: Primary | ICD-10-CM

## 2023-12-08 DIAGNOSIS — N18.9 CHRONIC KIDNEY DISEASE, UNSPECIFIED CKD STAGE: ICD-10-CM

## 2023-12-08 LAB
ALBUMIN SERPL-MCNC: 4.6 G/DL (ref 3.4–5)
ALBUMIN/GLOB SERPL: 2.4 {RATIO} (ref 1.1–2.2)
ALP SERPL-CCNC: 71 U/L (ref 40–129)
ALT SERPL-CCNC: 19 U/L (ref 10–40)
ANION GAP SERPL CALCULATED.3IONS-SCNC: 13 MMOL/L (ref 3–16)
AST SERPL-CCNC: 22 U/L (ref 15–37)
BASOPHILS # BLD: 0 K/UL (ref 0–0.2)
BASOPHILS NFR BLD: 0.3 %
BILIRUB SERPL-MCNC: 0.8 MG/DL (ref 0–1)
BUN SERPL-MCNC: 32 MG/DL (ref 7–20)
CALCIUM SERPL-MCNC: 9.5 MG/DL (ref 8.3–10.6)
CHLORIDE SERPL-SCNC: 103 MMOL/L (ref 99–110)
CO2 SERPL-SCNC: 24 MMOL/L (ref 21–32)
CREAT SERPL-MCNC: 1.9 MG/DL (ref 0.8–1.3)
DEPRECATED RDW RBC AUTO: 14.2 % (ref 12.4–15.4)
EOSINOPHIL # BLD: 0.4 K/UL (ref 0–0.6)
EOSINOPHIL NFR BLD: 4.4 %
FOLATE SERPL-MCNC: 19.2 NG/ML (ref 4.78–24.2)
GFR SERPLBLD CREATININE-BSD FMLA CKD-EPI: 37 ML/MIN/{1.73_M2}
GLUCOSE SERPL-MCNC: 180 MG/DL (ref 70–99)
HCT VFR BLD AUTO: 37.3 % (ref 40.5–52.5)
HGB BLD-MCNC: 12.7 G/DL (ref 13.5–17.5)
LYMPHOCYTES # BLD: 1.4 K/UL (ref 1–5.1)
LYMPHOCYTES NFR BLD: 16.6 %
MCH RBC QN AUTO: 30.6 PG (ref 26–34)
MCHC RBC AUTO-ENTMCNC: 33.9 G/DL (ref 31–36)
MCV RBC AUTO: 90.2 FL (ref 80–100)
MONOCYTES # BLD: 0.6 K/UL (ref 0–1.3)
MONOCYTES NFR BLD: 6.7 %
NEUTROPHILS # BLD: 5.9 K/UL (ref 1.7–7.7)
NEUTROPHILS NFR BLD: 72 %
PLATELET # BLD AUTO: 190 K/UL (ref 135–450)
PMV BLD AUTO: 7.7 FL (ref 5–10.5)
POTASSIUM SERPL-SCNC: 4.7 MMOL/L (ref 3.5–5.1)
PROT SERPL-MCNC: 6.5 G/DL (ref 6.4–8.2)
RBC # BLD AUTO: 4.14 M/UL (ref 4.2–5.9)
SODIUM SERPL-SCNC: 140 MMOL/L (ref 136–145)
TSH SERPL DL<=0.005 MIU/L-ACNC: 2.62 UIU/ML (ref 0.27–4.2)
VIT B12 SERPL-MCNC: 512 PG/ML (ref 211–911)
WBC # BLD AUTO: 8.3 K/UL (ref 4–11)

## 2023-12-08 RX ORDER — HYDRALAZINE HYDROCHLORIDE 25 MG/1
25 TABLET, FILM COATED ORAL 3 TIMES DAILY
Qty: 270 TABLET | Refills: 3 | Status: SHIPPED | OUTPATIENT
Start: 2023-12-08

## 2023-12-08 NOTE — TELEPHONE ENCOUNTER
Medication:   Requested Prescriptions     Pending Prescriptions Disp Refills    hydrALAZINE (APRESOLINE) 25 MG tablet 270 tablet 3     Sig: Take 1 tablet by mouth 3 times daily        Last Filled:  02/21/2023    Patient Phone Number: [phone] (home)     Last appt: 12/8/2023   Next appt: 4/8/2024    Last OARRS:        No data to display

## 2023-12-08 NOTE — TELEPHONE ENCOUNTER
Pt called requesting a refill of his hydrALAZINE (APRESOLINE) 25 MG tablet to be called into [facility] [phone]

## 2023-12-09 LAB
EST. AVERAGE GLUCOSE BLD GHB EST-MCNC: 154.2 MG/DL
HBA1C MFR BLD: 7 %

## 2023-12-11 ENCOUNTER — TELEPHONE (OUTPATIENT)
Dept: FAMILY MEDICINE CLINIC | Age: 68
End: 2023-12-11

## 2023-12-11 DIAGNOSIS — R20.2 PARESTHESIAS IN LEFT HAND: Primary | ICD-10-CM

## 2023-12-11 NOTE — TELEPHONE ENCOUNTER
Please let Esther Fisher know his sugar average looks very good at 7. His blood count is stable. His thyroid and b12/folate levels are in the normal range. His kidney filtering is stable; I would like him to continue following closely with the kidney specialist. With his left hand symptoms, I have ordered an xray of his neck as next step; if he could get this done at hospital sometime this week.

## 2023-12-14 ENCOUNTER — HOSPITAL ENCOUNTER (OUTPATIENT)
Age: 68
Discharge: HOME | End: 2023-12-14
Payer: MEDICARE

## 2023-12-14 ENCOUNTER — HOSPITAL ENCOUNTER (OUTPATIENT)
Dept: GENERAL RADIOLOGY | Age: 68
Discharge: HOME | End: 2023-12-14
Payer: MEDICARE

## 2023-12-14 DIAGNOSIS — R20.2 PARESTHESIAS IN LEFT HAND: ICD-10-CM

## 2023-12-14 PROCEDURE — 72040 X-RAY EXAM NECK SPINE 2-3 VW: CPT

## 2023-12-20 ENCOUNTER — OFFICE VISIT (OUTPATIENT)
Dept: PHARMACY | Age: 68
End: 2023-12-20
Payer: MEDICARE

## 2023-12-20 ENCOUNTER — TELEPHONE (OUTPATIENT)
Dept: FAMILY MEDICINE CLINIC | Age: 68
End: 2023-12-20

## 2023-12-20 DIAGNOSIS — R20.0 NUMBNESS OF LEFT HAND: ICD-10-CM

## 2023-12-20 DIAGNOSIS — I65.23 CAROTID ARTERY PLAQUE, BILATERAL: Primary | ICD-10-CM

## 2023-12-20 DIAGNOSIS — E11.8 DIABETES MELLITUS TYPE 2 WITH COMPLICATIONS (HCC): Primary | ICD-10-CM

## 2023-12-20 PROCEDURE — 99214 OFFICE O/P EST MOD 30 MIN: CPT | Performed by: SPEECH-LANGUAGE PATHOLOGIST

## 2023-12-20 RX ORDER — INSULIN GLARGINE 100 [IU]/ML
18 INJECTION, SOLUTION SUBCUTANEOUS NIGHTLY
Qty: 2 ADJUSTABLE DOSE PRE-FILLED PEN SYRINGE | Refills: 5 | Status: SHIPPED | OUTPATIENT
Start: 2023-12-20 | End: 2023-12-21

## 2023-12-20 NOTE — PATIENT INSTRUCTIONS
Patient Instructions:   your Lantus refill from [facility] on [facility]. Call us if you have any trouble picking up your refill at [phone]. Take your glipizide 10 mg two tablets a half hour before breakfast and two tablets a half hour before dinner. You can try checking your blood sugar on occasion two hours after a meal and see how different meals/foods affect your blood sugars. Continue Lantus 18 units every night   Continue your diabetes medications:  Pioglitazone  Glipizide  Continue to limit foods that are high in carbohydrates like potato chips. Work to keep your carbohydrates at 45-60 grams per meal  Continue to check your blood sugar every morning. Continue to walk 30 minutes daily. Continue your iron medication until your doctor instructs you differently. Continue to work to lose weight. Please call me if you start getting blood sugars below 80. Let us know if your sugars increase above 250.

## 2023-12-20 NOTE — PROGRESS NOTES
MG/5ML solution Take 5 mLs by mouth daily      [facility] Natural Products (AIRBORNE ELDERBERRY PO) Take 1 tablet by mouth Twice a Week      pantoprazole (PROTONIX) 40 MG tablet TAKE ONE TABLET BY MOUTH EVERY MORNING BEFORE BREAKFAST 90 tablet 3    blood glucose test strips ([employer] GLUCOSE TEST) strip Test twice daily 100 strip 11    metoprolol succinate (TOPROL XL) 25 MG extended release tablet TAKE ONE TABLET BY MOUTH EVERY MORNING 90 tablet 3    Cranberry 1000 MG CAPS Take 1 capsule by mouth daily      Apple Cider Vinegar 500 MG TABS Take 1 tablet by mouth daily      rosuvastatin (CRESTOR) 40 MG tablet TAKE ONE TABLET BY MOUTH ONCE NIGHTLY 90 tablet 3    Cholecalciferol (VITAMIN D3) 50 MCG (2000 UT) CAPS TAKE ONE CAPSULE BY MOUTH DAILY 90 capsule 2    losartan (COZAAR) 50 MG tablet TAKE ONE TABLET BY MOUTH DAILY 90 tablet 3    tamsulosin (FLOMAX) 0.4 MG capsule Take 1 capsule by mouth daily 90 capsule 3    clopidogrel (PLAVIX) 75 MG tablet Take 1 tablet by mouth daily The first day you take Plavix, take 4 tablets to equal 300 mg. Then, only take 1 tablet (75 mg) daily. (Patient taking differently: Take 1 tablet by mouth daily) 90 tablet 3    aspirin 81 MG chewable tablet Take 1 tablet by mouth daily 30 tablet 3    Easy Touch Lancets 33G/Twist MISC USE TO TEST THREE TIMES A  each 5    Blood Glucose Monitoring Suppl (ONE TOUCH ULTRA 2) w/Device KIT 1 kit by Does not apply route daily 1 kit 0    Omega-3 Fatty Acids (FISH OIL) 1000 MG CAPS Take 1 capsule by mouth daily 90 capsule 3    Multiple Vitamins-Minerals (THERAPEUTIC MULTIVITAMIN-MINERALS) tablet Take 1 tablet by mouth daily       No current facility-administered medications for this visit. Reviewed and updated all medications at today's visit.       No adverse reactions reported  No missed doses were noted     Barriers to Medication: Cost    Provided counseling with regards to Diabetes medications    Comments:       Device Education:   Provided

## 2023-12-20 NOTE — TELEPHONE ENCOUNTER
Please let patient know his x-ray showed stable arthritic findings. With his history of left arm numbness and current left hand numbness, I would suspect the neck being the cause of his symptoms. If he would like, I could order a nerve conduction study to see if it suggest the neck being the source or there being a problem like diabetic neuropathy. There was some plaque of the neck arteries seen. With his heart history, I would like to make sure that there is not extensive plaque that would need treatment. If he okay with having a carotid Doppler? He is on medications currently that do help with stroke prevention as well.

## 2023-12-22 NOTE — TELEPHONE ENCOUNTER
Pt returned call and I advised of notes. Pt stated he is fine with these test being order so he can find out what is going on.

## 2023-12-28 NOTE — TELEPHONE ENCOUNTER
LMOVM to let Pt know the test orders were put in and Pt can call central scheduling to schedule these, left that number on VM and advised to call office back if any questions.

## 2023-12-29 ENCOUNTER — HOSPITAL ENCOUNTER (OUTPATIENT)
Dept: VASCULAR LAB | Age: 68
Discharge: HOME | End: 2023-12-29
Payer: MEDICARE

## 2023-12-29 DIAGNOSIS — I65.23 CAROTID ARTERY PLAQUE, BILATERAL: ICD-10-CM

## 2023-12-29 PROCEDURE — 93880 EXTRACRANIAL BILAT STUDY: CPT

## 2024-01-15 RX ORDER — ACETAMINOPHEN 160 MG
TABLET,DISINTEGRATING ORAL
Qty: 90 CAPSULE | Refills: 3 | Status: SHIPPED | OUTPATIENT
Start: 2024-01-15

## 2024-01-15 NOTE — TELEPHONE ENCOUNTER
Medication:   Requested Prescriptions     Pending Prescriptions Disp Refills    Cholecalciferol (VITAMIN D3) 50 MCG (2000 UT) CAPS [Pharmacy Med Name: VITAMIN D3 50 MCG SOFTGEL] 90 capsule 3     Sig: TAKE ONE CAPSULE BY MOUTH DAILY        Last Filled:  10/17/23    Patient Phone Number: [phone] (home)     Last appt: 12/8/2023   Next appt: 4/8/2024    Last OARRS:        No data to display

## 2024-02-05 RX ORDER — CLOPIDOGREL BISULFATE 75 MG/1
75 TABLET ORAL DAILY
Qty: 90 TABLET | Refills: 3 | Status: SHIPPED | OUTPATIENT
Start: 2024-02-05

## 2024-02-22 ENCOUNTER — OFFICE VISIT (OUTPATIENT)
Dept: PHARMACY | Age: 69
End: 2024-02-22
Payer: MEDICARE

## 2024-02-22 DIAGNOSIS — E11.8 DIABETES MELLITUS TYPE 2 WITH COMPLICATIONS (HCC): Primary | ICD-10-CM

## 2024-02-22 PROCEDURE — 99213 OFFICE O/P EST LOW 20 MIN: CPT | Performed by: SPEECH-LANGUAGE PATHOLOGIST

## 2024-02-22 NOTE — PATIENT INSTRUCTIONS
Patient Instructions:  Reduce your Lantus dose to 14 units nightly.  Make sure to bring your glucometer to your next visit.  Use the coupon I provided to  your Lantus refill from [facility] on [address].  Call us if you have any trouble picking up your refill at [phone].  Take your glipizide 10 mg two tablets a half hour before breakfast and two tablets a half hour before dinner.  You can try checking your blood sugar on occasion two hours after a meal and see how different meals/foods affect your blood sugars.  Continue your oral diabetes medications:  Pioglitazone  Glipizide  Continue to limit foods that are high in carbohydrates like potato chips.   Work to keep your carbohydrates at 45-60 grams per meal  Continue to check your blood sugar every morning.   Continue to walk 30 minutes daily.  Continue to work to lose weight.  Please call me at [phone] if you start getting blood sugars below 80. Let us know if your sugars increase above 250.

## 2024-02-27 ENCOUNTER — TELEMEDICINE (OUTPATIENT)
Dept: FAMILY MEDICINE CLINIC | Age: 69
End: 2024-02-27
Payer: MEDICARE

## 2024-02-27 DIAGNOSIS — Z79.4 TYPE 2 DIABETES MELLITUS WITH STAGE 3B CHRONIC KIDNEY DISEASE, WITH LONG-TERM CURRENT USE OF INSULIN (HCC): ICD-10-CM

## 2024-02-27 DIAGNOSIS — N18.32 TYPE 2 DIABETES MELLITUS WITH STAGE 3B CHRONIC KIDNEY DISEASE, WITH LONG-TERM CURRENT USE OF INSULIN (HCC): ICD-10-CM

## 2024-02-27 DIAGNOSIS — N18.32 STAGE 3B CHRONIC KIDNEY DISEASE (HCC): ICD-10-CM

## 2024-02-27 DIAGNOSIS — E11.22 TYPE 2 DIABETES MELLITUS WITH STAGE 3B CHRONIC KIDNEY DISEASE, WITH LONG-TERM CURRENT USE OF INSULIN (HCC): ICD-10-CM

## 2024-02-27 DIAGNOSIS — Z00.00 MEDICARE ANNUAL WELLNESS VISIT, SUBSEQUENT: Primary | ICD-10-CM

## 2024-02-27 PROCEDURE — G0439 PPPS, SUBSEQ VISIT: HCPCS | Performed by: FAMILY MEDICINE

## 2024-02-27 PROCEDURE — 3017F COLORECTAL CA SCREEN DOC REV: CPT | Performed by: FAMILY MEDICINE

## 2024-02-27 PROCEDURE — 1123F ACP DISCUSS/DSCN MKR DOCD: CPT | Performed by: FAMILY MEDICINE

## 2024-02-27 PROCEDURE — 3046F HEMOGLOBIN A1C LEVEL >9.0%: CPT | Performed by: FAMILY MEDICINE

## 2024-02-27 PROCEDURE — G8484 FLU IMMUNIZE NO ADMIN: HCPCS | Performed by: FAMILY MEDICINE

## 2024-02-27 SDOH — ECONOMIC STABILITY: INCOME INSECURITY: HOW HARD IS IT FOR YOU TO PAY FOR THE VERY BASICS LIKE FOOD, HOUSING, MEDICAL CARE, AND HEATING?: NOT HARD AT ALL

## 2024-02-27 SDOH — ECONOMIC STABILITY: FOOD INSECURITY: WITHIN THE PAST 12 MONTHS, THE FOOD YOU BOUGHT JUST DIDN'T LAST AND YOU DIDN'T HAVE MONEY TO GET MORE.: NEVER TRUE

## 2024-02-27 SDOH — ECONOMIC STABILITY: FOOD INSECURITY: WITHIN THE PAST 12 MONTHS, YOU WORRIED THAT YOUR FOOD WOULD RUN OUT BEFORE YOU GOT MONEY TO BUY MORE.: NEVER TRUE

## 2024-02-27 ASSESSMENT — PATIENT HEALTH QUESTIONNAIRE - PHQ9
1. LITTLE INTEREST OR PLEASURE IN DOING THINGS: 0
2. FEELING DOWN, DEPRESSED OR HOPELESS: 0
SUM OF ALL RESPONSES TO PHQ QUESTIONS 1-9: 0
SUM OF ALL RESPONSES TO PHQ9 QUESTIONS 1 & 2: 0
SUM OF ALL RESPONSES TO PHQ QUESTIONS 1-9: 0

## 2024-02-27 ASSESSMENT — LIFESTYLE VARIABLES
HOW MANY STANDARD DRINKS CONTAINING ALCOHOL DO YOU HAVE ON A TYPICAL DAY: PATIENT DOES NOT DRINK
HOW OFTEN DO YOU HAVE A DRINK CONTAINING ALCOHOL: NEVER

## 2024-02-27 NOTE — PROGRESS NOTES
Medicare Annual Wellness Visit    Eye Doctor--- [facility]  Cardiologist--- Dr. Gilmore  Nephrologist--- Dr. Arreola  Podiatrist--- In [place] , unsure name  Endocrine--- Dr. Sarika Dowell Jr. is here for Medicare AWV    Assessment & Plan   Medicare annual wellness visit, subsequent  Stage 3b chronic kidney disease (HCC)  Type 2 diabetes mellitus with stage 3b chronic kidney disease, with long-term current use of insulin (HCC)    He will consider Shingrix and RSV vaccines    He will continue following with nephrology and endocrinology for above diagnoses    Recommendations for Preventive Services Due: see orders and patient instructions/AVS.    Recommended screening schedule for the next 5-10 years is provided to the patient in written form: see Patient Instructions/AVS.     Return for keep appt for April 8th please.     Subjective     Bill reports he is doing well.  He has no concerns.  He is up-to-date with colonoscopy.    Patient's complete Health Risk Assessment and screening values have been reviewed and are found in Flowsheets. The following problems were reviewed today and where indicated follow up appointments were made and/or referrals ordered.    Positive Risk Factor Screenings with Interventions:                Activity, Diet, and Weight:  On average, how many days per week do you engage in moderate to strenuous exercise (like a brisk walk)?: 7 days  On average, how many minutes do you engage in exercise at this level?: 30 min    Do you eat balanced/healthy meals regularly?: (!) No    There is no height or weight on file to calculate BMI. (!) Abnormal        Dentist Screen:  Have you seen the dentist within the past year?: (!) No            Advanced Directives:  Do you have a Living Will?: (!) No      General:  Importance of advanced directives discussed  Recommended removing any fall hazards  Continued healthy eating, exercise recommended  Patient reports all needs are met and is able to

## 2024-02-28 ENCOUNTER — TELEPHONE (OUTPATIENT)
Dept: FAMILY MEDICINE CLINIC | Age: 69
End: 2024-02-28

## 2024-02-28 PROBLEM — R33.9 URINARY RETENTION: Status: RESOLVED | Noted: 2017-05-26 | Resolved: 2024-02-28

## 2024-02-28 PROBLEM — E11.65 UNCONTROLLED TYPE 2 DIABETES MELLITUS WITH HYPERGLYCEMIA (HCC): Status: RESOLVED | Noted: 2020-11-22 | Resolved: 2024-02-28

## 2024-02-28 PROBLEM — R79.89 SERUM CREATININE RAISED: Status: RESOLVED | Noted: 2018-09-22 | Resolved: 2024-02-28

## 2024-02-28 PROBLEM — M31.6 GCA (GIANT CELL ARTERITIS) (HCC): Status: RESOLVED | Noted: 2020-10-12 | Resolved: 2024-02-28

## 2024-02-28 PROBLEM — N18.32 STAGE 3B CHRONIC KIDNEY DISEASE (HCC): Status: ACTIVE | Noted: 2024-02-28

## 2024-02-28 NOTE — TELEPHONE ENCOUNTER
Please call Raul, I did not ask him on phone yesterday if he is seeing urology with his history of elevated PSA, please clarify

## 2024-04-01 NOTE — TELEPHONE ENCOUNTER
Medication:   Requested Prescriptions     Pending Prescriptions Disp Refills    losartan (COZAAR) 50 MG tablet [Pharmacy Med Name: LOSARTAN POTASSIUM 50 MG TAB] 90 tablet 3     Sig: TAKE ONE TABLET BY MOUTH DAILY        Last Filled:  01/02/2024    Patient Phone Number: [phone] (home)     Last appt: 2/27/2024   Next appt: 4/8/2024    Last OARRS:        No data to display

## 2024-04-01 NOTE — TELEPHONE ENCOUNTER
Medication:   Requested Prescriptions     Pending Prescriptions Disp Refills    glipiZIDE (GLUCOTROL) 10 MG tablet [Pharmacy Med Name: glipiZIDE 10 MG TABLET] 360 tablet 3     Sig: TAKE 2 TABLETS BY MOUTH TWICE A DAY    tamsulosin (FLOMAX) 0.4 MG capsule [Pharmacy Med Name: TAMSULOSIN HCL 0.4 MG CAPSULE] 90 capsule 3     Sig: TAKE ONE CAPSULE BY MOUTH DAILY        Last Filled:  glipizide-12/31/24  Tamsulosin-12/26/24    Patient Phone Number: [phone] (home)     Last appt: 2/27/2024   Next appt: 4/1/2024    Last OARRS:        No data to display

## 2024-04-02 ENCOUNTER — OFFICE VISIT (OUTPATIENT)
Dept: PHARMACY | Age: 69
End: 2024-04-02
Payer: MEDICARE

## 2024-04-02 DIAGNOSIS — E11.8 DIABETES MELLITUS TYPE 2 WITH COMPLICATIONS (HCC): Primary | ICD-10-CM

## 2024-04-02 PROCEDURE — 99213 OFFICE O/P EST LOW 20 MIN: CPT | Performed by: SPEECH-LANGUAGE PATHOLOGIST

## 2024-04-02 RX ORDER — LOSARTAN POTASSIUM 50 MG/1
TABLET ORAL
Qty: 90 TABLET | Refills: 3 | Status: SHIPPED | OUTPATIENT
Start: 2024-04-02

## 2024-04-02 NOTE — PROGRESS NOTES
[facility]  [facility]  Pharmacy  Diabetes Service    [address], [place] [address]  Phone: [phone]  Fax: [phone]    NAME: Caden Dowell Jr.  MEDICAL RECORD NUMBER:  [id number]  AGE: 68 y.o.   GENDER: male  : 1955  EPISODE DATE:  2024       Caden Dowell was referred to the [facility] by  Dr. Curry  for Diabetes services.   Special Instructions per the Referral Include: Patient Education and Medication Management      ICD-10-CM    1. Diabetes mellitus type 2 with complications (HCC)  E11.8         Referral goals: No diabetes goals were included on the referral     If diabetes goals are not included on the referral, ADA guidelines will be used.     Francisca Negrete is a 68 y.o. here for the Diabetes Service for self-management education, medication review including over the counter medications and herbal products, overall wellbeing assessment, transition of care and any needed adjustments with updates and recommendations communicated to the referring physician.       Patient findings:     Hyperglycemia Symptoms:    Denies polyuria  Denies polydipsia  Denies polyphagia  Denies vision changes  Denies fatigue  Denies symptoms of neuropathy  Denies foot ulcerations  Denies nausea  Denies vomiting  Denies slow to heal    Hypoglycemia Symptoms:   Shakiness with BG at 80.      Other general findings: none    Recent hospitalization:   none    - Date of diabetes diagnosis: ~    Ongoing factors affecting care:   - follows with eye doctor at least yearly  - follows with podiatry every few months  - ASCVD (CAD w hx LICHA to LAD)  - CKD  - alogliptin > $200  - Metformin was stopped - he reports he was told \"it messes with his kidneys\"  - took Janumet at one point and it was free, but was stopped likely due to the Metformin.   - SGLT2I not affordable.     Objective   There were no vitals taken for this visit.    Past Medical History:

## 2024-04-02 NOTE — PATIENT INSTRUCTIONS
Patient Instructions:  Continue to take your glipizide 10 mg two tablets a half hour before breakfast and two tablets a half hour before dinner.  Continue Lantus 14 units nightly and Actos 45 mg daily.  You can try checking your blood sugar on occasion two hours after a meal and see how different meals/foods affect your blood sugars.  Continue to limit foods that are high in carbohydrates like potato chips.   Work to keep your carbohydrates at 45-60 grams per meal  Continue to check your blood sugar every morning.   Continue to walk 30 minutes daily.  Continue your iron medication until your doctor instructs you differently.  Continue to work to lose weight.  Please call me if you start getting blood sugars below 80. Let us know if your sugars increase above 250.

## 2024-04-04 RX ORDER — GLIPIZIDE 10 MG/1
20 TABLET ORAL 2 TIMES DAILY
Qty: 360 TABLET | Refills: 3 | Status: SHIPPED | OUTPATIENT
Start: 2024-04-04

## 2024-04-04 RX ORDER — TAMSULOSIN HYDROCHLORIDE 0.4 MG/1
0.4 CAPSULE ORAL DAILY
Qty: 90 CAPSULE | Refills: 3 | Status: SHIPPED | OUTPATIENT
Start: 2024-04-04

## 2024-04-08 ENCOUNTER — OFFICE VISIT (OUTPATIENT)
Dept: FAMILY MEDICINE CLINIC | Age: 69
End: 2024-04-08
Payer: COMMERCIAL

## 2024-04-08 VITALS
DIASTOLIC BLOOD PRESSURE: 68 MMHG | HEART RATE: 89 BPM | TEMPERATURE: 97.9 F | BODY MASS INDEX: 32.99 KG/M2 | SYSTOLIC BLOOD PRESSURE: 124 MMHG | WEIGHT: 210.2 LBS | HEIGHT: 67 IN | OXYGEN SATURATION: 97 %

## 2024-04-08 DIAGNOSIS — N18.32 TYPE 2 DIABETES MELLITUS WITH STAGE 3B CHRONIC KIDNEY DISEASE, WITH LONG-TERM CURRENT USE OF INSULIN (HCC): ICD-10-CM

## 2024-04-08 DIAGNOSIS — Z79.4 TYPE 2 DIABETES MELLITUS WITH STAGE 3B CHRONIC KIDNEY DISEASE, WITH LONG-TERM CURRENT USE OF INSULIN (HCC): ICD-10-CM

## 2024-04-08 DIAGNOSIS — M54.32 SCIATICA OF LEFT SIDE: Primary | ICD-10-CM

## 2024-04-08 DIAGNOSIS — I10 ESSENTIAL HYPERTENSION: ICD-10-CM

## 2024-04-08 DIAGNOSIS — E11.22 TYPE 2 DIABETES MELLITUS WITH STAGE 3B CHRONIC KIDNEY DISEASE, WITH LONG-TERM CURRENT USE OF INSULIN (HCC): ICD-10-CM

## 2024-04-08 DIAGNOSIS — N18.32 STAGE 3B CHRONIC KIDNEY DISEASE (HCC): ICD-10-CM

## 2024-04-08 LAB
ANION GAP SERPL CALCULATED.3IONS-SCNC: 16 MMOL/L (ref 3–16)
BUN SERPL-MCNC: 30 MG/DL (ref 7–20)
CALCIUM SERPL-MCNC: 10.4 MG/DL (ref 8.3–10.6)
CHLORIDE SERPL-SCNC: 102 MMOL/L (ref 99–110)
CO2 SERPL-SCNC: 22 MMOL/L (ref 21–32)
CREAT SERPL-MCNC: 1.7 MG/DL (ref 0.8–1.3)
GFR SERPLBLD CREATININE-BSD FMLA CKD-EPI: 43 ML/MIN/{1.73_M2}
GLUCOSE SERPL-MCNC: 139 MG/DL (ref 70–99)
POTASSIUM SERPL-SCNC: 4.9 MMOL/L (ref 3.5–5.1)
SODIUM SERPL-SCNC: 140 MMOL/L (ref 136–145)

## 2024-04-08 PROCEDURE — G2211 COMPLEX E/M VISIT ADD ON: HCPCS | Performed by: FAMILY MEDICINE

## 2024-04-08 PROCEDURE — G8417 CALC BMI ABV UP PARAM F/U: HCPCS | Performed by: FAMILY MEDICINE

## 2024-04-08 PROCEDURE — 1036F TOBACCO NON-USER: CPT | Performed by: FAMILY MEDICINE

## 2024-04-08 PROCEDURE — G8427 DOCREV CUR MEDS BY ELIG CLIN: HCPCS | Performed by: FAMILY MEDICINE

## 2024-04-08 PROCEDURE — 3078F DIAST BP <80 MM HG: CPT | Performed by: FAMILY MEDICINE

## 2024-04-08 PROCEDURE — 2022F DILAT RTA XM EVC RTNOPTHY: CPT | Performed by: FAMILY MEDICINE

## 2024-04-08 PROCEDURE — 3044F HG A1C LEVEL LT 7.0%: CPT | Performed by: FAMILY MEDICINE

## 2024-04-08 PROCEDURE — 99214 OFFICE O/P EST MOD 30 MIN: CPT | Performed by: FAMILY MEDICINE

## 2024-04-08 PROCEDURE — 1123F ACP DISCUSS/DSCN MKR DOCD: CPT | Performed by: FAMILY MEDICINE

## 2024-04-08 PROCEDURE — 3017F COLORECTAL CA SCREEN DOC REV: CPT | Performed by: FAMILY MEDICINE

## 2024-04-08 PROCEDURE — 3074F SYST BP LT 130 MM HG: CPT | Performed by: FAMILY MEDICINE

## 2024-04-08 RX ORDER — PREDNISONE 20 MG/1
40 TABLET ORAL DAILY
Qty: 4 TABLET | Refills: 0 | Status: SHIPPED | OUTPATIENT
Start: 2024-04-08 | End: 2024-04-10

## 2024-04-08 RX ORDER — GABAPENTIN 100 MG/1
200 CAPSULE ORAL 2 TIMES DAILY
Qty: 40 CAPSULE | Refills: 0 | Status: SHIPPED | OUTPATIENT
Start: 2024-04-08 | End: 2024-04-18

## 2024-04-08 SDOH — SOCIAL STABILITY: SOCIAL INSECURITY
WITHIN THE LAST YEAR, HAVE YOU BEEN KICKED, HIT, SLAPPED, OR OTHERWISE PHYSICALLY HURT BY YOUR PARTNER OR EX-PARTNER?: NO

## 2024-04-08 SDOH — SOCIAL STABILITY: SOCIAL NETWORK
DO YOU BELONG TO ANY CLUBS OR ORGANIZATIONS SUCH AS CHURCH GROUPS UNIONS, FRATERNAL OR ATHLETIC GROUPS, OR SCHOOL GROUPS?: NO

## 2024-04-08 SDOH — HEALTH STABILITY: PHYSICAL HEALTH: ON AVERAGE, HOW MANY MINUTES DO YOU ENGAGE IN EXERCISE AT THIS LEVEL?: 40 MIN

## 2024-04-08 SDOH — SOCIAL STABILITY: SOCIAL INSECURITY: WITHIN THE LAST YEAR, HAVE YOU BEEN AFRAID OF YOUR PARTNER OR EX-PARTNER?: NO

## 2024-04-08 SDOH — SOCIAL STABILITY: SOCIAL INSECURITY: WITHIN THE LAST YEAR, HAVE YOU BEEN HUMILIATED OR EMOTIONALLY ABUSED IN OTHER WAYS BY YOUR PARTNER OR EX-PARTNER?: NO

## 2024-04-08 SDOH — HEALTH STABILITY: MENTAL HEALTH
STRESS IS WHEN SOMEONE FEELS TENSE, NERVOUS, ANXIOUS, OR CAN'T SLEEP AT NIGHT BECAUSE THEIR MIND IS TROUBLED. HOW STRESSED ARE YOU?: ONLY A LITTLE

## 2024-04-08 SDOH — ECONOMIC STABILITY: FOOD INSECURITY: WITHIN THE PAST 12 MONTHS, YOU WORRIED THAT YOUR FOOD WOULD RUN OUT BEFORE YOU GOT MONEY TO BUY MORE.: NEVER TRUE

## 2024-04-08 SDOH — SOCIAL STABILITY: SOCIAL INSECURITY
WITHIN THE LAST YEAR, HAVE TO BEEN RAPED OR FORCED TO HAVE ANY KIND OF SEXUAL ACTIVITY BY YOUR PARTNER OR EX-PARTNER?: NO

## 2024-04-08 SDOH — ECONOMIC STABILITY: INCOME INSECURITY: IN THE LAST 12 MONTHS, WAS THERE A TIME WHEN YOU WERE NOT ABLE TO PAY THE MORTGAGE OR RENT ON TIME?: NO

## 2024-04-08 SDOH — HEALTH STABILITY: MENTAL HEALTH: HOW OFTEN DO YOU HAVE A DRINK CONTAINING ALCOHOL?: NEVER

## 2024-04-08 SDOH — SOCIAL STABILITY: SOCIAL NETWORK: HOW OFTEN DO YOU ATTENT MEETINGS OF THE CLUB OR ORGANIZATION YOU BELONG TO?: NEVER

## 2024-04-08 SDOH — ECONOMIC STABILITY: FOOD INSECURITY: WITHIN THE PAST 12 MONTHS, THE FOOD YOU BOUGHT JUST DIDN'T LAST AND YOU DIDN'T HAVE MONEY TO GET MORE.: NEVER TRUE

## 2024-04-08 SDOH — ECONOMIC STABILITY: HOUSING INSECURITY: IN THE LAST 12 MONTHS, HOW MANY PLACES HAVE YOU LIVED?: 1

## 2024-04-08 SDOH — SOCIAL STABILITY: SOCIAL NETWORK
IN A TYPICAL WEEK, HOW MANY TIMES DO YOU TALK ON THE PHONE WITH FAMILY, FRIENDS, OR NEIGHBORS?: MORE THAN THREE TIMES A WEEK

## 2024-04-08 SDOH — SOCIAL STABILITY: SOCIAL NETWORK: ARE YOU MARRIED, WIDOWED, DIVORCED, SEPARATED, NEVER MARRIED, OR LIVING WITH A PARTNER?: MARRIED

## 2024-04-08 SDOH — HEALTH STABILITY: PHYSICAL HEALTH: ON AVERAGE, HOW MANY DAYS PER WEEK DO YOU ENGAGE IN MODERATE TO STRENUOUS EXERCISE (LIKE A BRISK WALK)?: 7 DAYS

## 2024-04-08 SDOH — ECONOMIC STABILITY: INCOME INSECURITY: HOW HARD IS IT FOR YOU TO PAY FOR THE VERY BASICS LIKE FOOD, HOUSING, MEDICAL CARE, AND HEATING?: NOT HARD AT ALL

## 2024-04-08 SDOH — HEALTH STABILITY: MENTAL HEALTH: HOW MANY STANDARD DRINKS CONTAINING ALCOHOL DO YOU HAVE ON A TYPICAL DAY?: PATIENT DOES NOT DRINK

## 2024-04-08 SDOH — SOCIAL STABILITY: SOCIAL NETWORK: HOW OFTEN DO YOU ATTEND CHURCH OR RELIGIOUS SERVICES?: NEVER

## 2024-04-08 SDOH — SOCIAL STABILITY: SOCIAL NETWORK: HOW OFTEN DO YOU GET TOGETHER WITH FRIENDS OR RELATIVES?: MORE THAN THREE TIMES A WEEK

## 2024-04-08 ASSESSMENT — PATIENT HEALTH QUESTIONNAIRE - PHQ9
SUM OF ALL RESPONSES TO PHQ QUESTIONS 1-9: 0
1. LITTLE INTEREST OR PLEASURE IN DOING THINGS: NOT AT ALL
SUM OF ALL RESPONSES TO PHQ QUESTIONS 1-9: 0
SUM OF ALL RESPONSES TO PHQ9 QUESTIONS 1 & 2: 0
2. FEELING DOWN, DEPRESSED OR HOPELESS: NOT AT ALL

## 2024-04-08 ASSESSMENT — ANXIETY QUESTIONNAIRES
4. TROUBLE RELAXING: NOT AT ALL
5. BEING SO RESTLESS THAT IT IS HARD TO SIT STILL: NOT AT ALL
1. FEELING NERVOUS, ANXIOUS, OR ON EDGE: NOT AT ALL
2. NOT BEING ABLE TO STOP OR CONTROL WORRYING: NOT AT ALL
6. BECOMING EASILY ANNOYED OR IRRITABLE: NOT AT ALL
7. FEELING AFRAID AS IF SOMETHING AWFUL MIGHT HAPPEN: NOT AT ALL
IF YOU CHECKED OFF ANY PROBLEMS ON THIS QUESTIONNAIRE, HOW DIFFICULT HAVE THESE PROBLEMS MADE IT FOR YOU TO DO YOUR WORK, TAKE CARE OF THINGS AT HOME, OR GET ALONG WITH OTHER PEOPLE: NOT DIFFICULT AT ALL
GAD7 TOTAL SCORE: 0
3. WORRYING TOO MUCH ABOUT DIFFERENT THINGS: NOT AT ALL

## 2024-04-08 NOTE — PROGRESS NOTES
1 week--- left buttock           Diagnosis Orders   1. Sciatica of left side        2. Stage 3b chronic kidney disease ([facility])  Basic Metabolic Panel      3. Type 2 diabetes mellitus with stage 3b chronic kidney disease, with long-term current use of insulin ([facility])  Hemoglobin A1C        For his sciatica, prednisone 40 mg daily prescribed, he was counseled to watch his glucose level closely, he had been taking some NSAIDs, he was counseled that he should be taking no NSAIDs due to his chronic kidney disease    Reassurance given over skin lesion, he does not want to have it excised at this time    CKD wise, his last GFR level was stable, he is to avoid all NSAIDs, he is to keep follow-up with nephrology    For his chronic neuropathic pain, he is getting MRI, cervical radiculopathy suspected, with this and his sciatica, low-dose gabapentin trial prescribed    Diabetes wise, his las last A1C in December was reasonably controlled. Check A1C today.    His htn is controlled    Follow-up in 3 months, or sooner if needed      O: /68 (Site: Right Upper Arm, Position: Sitting, Cuff Size: Medium Adult)   Pulse 89   Temp 97.9 °F (36.6 °C)   Ht 1.702 m (5' 7\")   Wt 95.3 kg (210 lb 3.2 oz)   SpO2 97%   BMI 32.92 kg/m²   Gen- NAD, pleasant  Neck-supple, no lymphadenopathy appreciated  HEENT- Eyes without icterus or injection  Lungs- CTAB  Heart- RRR  Abdomen- Soft, non tender  Ext-left hamstring and quad and calf hypertonic  Skin-very benign appearing skin neoplasm of back appreciated  Psych- Appropriate, euthymic, no si/hi    S: CC-check up    HPI-Raul presents for follow-up of diabetes, hypertension.  He reports he is doing okay overall.  His left-sided sciatica has been acting up for about 1 week.  It is very painful.  It stops at the knee level.  He denies known injury.  He has had pain like this in the past.  Wife also has seen a skin lesion on his back that she would like looked at.  It is not painful.  It feels

## 2024-04-09 LAB
EST. AVERAGE GLUCOSE BLD GHB EST-MCNC: 142.7 MG/DL
HBA1C MFR BLD: 6.6 %

## 2024-04-12 ENCOUNTER — TELEPHONE (OUTPATIENT)
Dept: FAMILY MEDICINE CLINIC | Age: 69
End: 2024-04-12

## 2024-04-12 NOTE — TELEPHONE ENCOUNTER
Pt was told by Dr ABBASI to call and let her know how the medication was doing. Pt stated the medication he was given for his back is really helping.

## 2024-04-15 RX ORDER — GABAPENTIN 100 MG/1
200 CAPSULE ORAL 2 TIMES DAILY
Qty: 120 CAPSULE | Refills: 5 | Status: SHIPPED | OUTPATIENT
Start: 2024-04-15 | End: 2024-10-12

## 2024-04-15 NOTE — TELEPHONE ENCOUNTER
Medication:   Requested Prescriptions     Pending Prescriptions Disp Refills    gabapentin (NEURONTIN) 100 MG capsule [Pharmacy Med Name: GABAPENTIN 100 MG CAPSULE] 40 capsule 0     Sig: TAKE 2 CAPSULES BY MOUTH TWICE A DAY FOR 10 DAYS       Last Filled:  4/8/2024    Patient Phone Number: [phone] (home)     Last appt: 4/8/2024   Next appt: 7/8/2024

## 2024-04-16 ENCOUNTER — HOSPITAL ENCOUNTER (OUTPATIENT)
Dept: MRI IMAGING | Age: 69
Discharge: HOME | End: 2024-04-16
Attending: PSYCHIATRY & NEUROLOGY
Payer: MEDICARE

## 2024-04-16 DIAGNOSIS — G56.03 BILATERAL CARPAL TUNNEL SYNDROME: ICD-10-CM

## 2024-04-16 DIAGNOSIS — M54.12 RADICULOPATHY, CERVICAL: ICD-10-CM

## 2024-04-16 PROCEDURE — 72141 MRI NECK SPINE W/O DYE: CPT

## 2024-04-16 NOTE — TELEPHONE ENCOUNTER
Great, med refilled yesterday after reading message, please let Bill know his A1C looks great, his kidney filtering is stable

## 2024-04-22 RX ORDER — LANOLIN ALCOHOL/MO/W.PET/CERES
CREAM (GRAM) TOPICAL
Qty: 180 TABLET | Refills: 1 | Status: SHIPPED | OUTPATIENT
Start: 2024-04-22

## 2024-04-22 NOTE — TELEPHONE ENCOUNTER
Medication:   Requested Prescriptions     Pending Prescriptions Disp Refills    ferrous sulfate (FE TABS 325) 325 (65 Fe) MG EC tablet [Pharmacy Med Name: FERROUS SULF  MG TABLET] 180 tablet 1     Sig: TAKE 1 TABLET BY MOUTH TWICE A DAY ( ONCE IN THE MORNING AND ONCE AT BEDTIME)       Last Filled:  10/13/2023    Patient Phone Number: [phone] (home)     Last appt: 4/8/2024   Next appt: 7/8/2024

## 2024-04-23 RX ORDER — ROSUVASTATIN CALCIUM 40 MG/1
40 TABLET, COATED ORAL NIGHTLY
Qty: 90 TABLET | Refills: 3 | Status: SHIPPED | OUTPATIENT
Start: 2024-04-23

## 2024-05-21 ENCOUNTER — TELEPHONE (OUTPATIENT)
Dept: FAMILY MEDICINE CLINIC | Age: 69
End: 2024-05-21

## 2024-05-21 NOTE — TELEPHONE ENCOUNTER
Was Rx gabapentin for sciatica. States this med is not helping with the pain. It is keeping him up at night. Wanting to know if dosage of gabapentin can be increased, or if can Rx something different. Please advise. Please call into [facility]. Pt is reachable at [phone]

## 2024-05-22 NOTE — TELEPHONE ENCOUNTER
I would like him to increase his gabapentin to 3 capsules in the morning and 3 capsules at bedtime. I also would recommend he see spine center. Is he ok with referral?

## 2024-05-22 NOTE — TELEPHONE ENCOUNTER
Called and notified pt of the gabapentin. Pt states an understanding and states he wants to hold off on the referral for now.

## 2024-05-27 DIAGNOSIS — M75.20 BICEPS TENDONITIS: ICD-10-CM

## 2024-05-27 DIAGNOSIS — M25.512 SHOULDER PAIN, LEFT: ICD-10-CM

## 2024-05-28 RX ORDER — AMLODIPINE BESYLATE 10 MG/1
TABLET ORAL
Qty: 90 TABLET | Refills: 2 | Status: SHIPPED | OUTPATIENT
Start: 2024-05-28

## 2024-05-28 NOTE — TELEPHONE ENCOUNTER
Medication:   Requested Prescriptions     Pending Prescriptions Disp Refills    amLODIPine (NORVASC) 10 MG tablet [Pharmacy Med Name: AMLODIPINE BESYLATE 10 MG TAB] 90 tablet 2     Sig: TAKE 1 TABLET BY MOUTH DAILY       Last Filled:  8/28/2023    Patient Phone Number: [phone] (home)     Last appt: 4/8/2024   Next appt: 7/8/2024

## 2024-06-04 ENCOUNTER — OFFICE VISIT (OUTPATIENT)
Dept: PHARMACY | Age: 69
End: 2024-06-04
Payer: MEDICARE

## 2024-06-04 DIAGNOSIS — E11.8 DIABETES MELLITUS TYPE 2 WITH COMPLICATIONS (HCC): Primary | ICD-10-CM

## 2024-06-04 PROCEDURE — 99213 OFFICE O/P EST LOW 20 MIN: CPT

## 2024-06-04 RX ORDER — INSULIN GLARGINE 100 [IU]/ML
14 INJECTION, SOLUTION SUBCUTANEOUS NIGHTLY
Qty: 15 ML | Refills: 3 | Status: SHIPPED | OUTPATIENT
Start: 2024-06-04

## 2024-06-04 NOTE — PATIENT INSTRUCTIONS
When you need a refill of your lantus, call [facility] at   Continue to take your glipizide 10 mg two tablets a half hour before breakfast and two tablets a half hour before dinner.  Continue Lantus 14 units nightly and Actos 45 mg daily.  You can try checking your blood sugar on occasion two hours after a meal and see how different meals/foods affect your blood sugars.  Continue to limit foods that are high in carbohydrates like potato chips.   Work to keep your carbohydrates at 45-60 grams per meal  Continue to check your blood sugar every morning.   Continue to walk 30 minutes daily.  Continue your iron medication until your doctor instructs you differently.  Continue to work to lose weight.  Please call me if you start getting blood sugars below 70. Let us know if your sugars increase above 250.

## 2024-06-04 NOTE — PROGRESS NOTES
[facility]  [facility]patient [facility]  Pharmacy  Diabetes Service    [address], [place] [address]  Phone: [phone]  Fax: [phone]    NAME: Caden Dowell Jr.  MEDICAL RECORD NUMBER:  [id number]  AGE: 68 y.o.   GENDER: male  : 1955  EPISODE DATE:  2024     Caden Dowell was referred to the [facility] by  Dr. Curry  for Diabetes services. Special Instructions per the Referral Include: Patient Education and Medication Management      ICD-10-CM    1. Diabetes mellitus type 2 with complications (HCC)  E11.8         Referral goals: No diabetes goals were included on the referral     If diabetes goals are not included on the referral, ADA guidelines will be used.     Francisca Negrete is a 68 y.o. here for the Diabetes Service for self-management education, medication review including over the counter medications and herbal products, overall wellbeing assessment, transition of care and any needed adjustments with updates and recommendations communicated to the referring physician.       Patient findings:     Hyperglycemia Symptoms:    Denies polyuria  Denies polydipsia  Denies polyphagia  Denies vision changes  Denies fatigue  Denies symptoms of neuropathy  Denies foot ulcerations  Denies nausea  Denies vomiting  Denies slow to heal    Hypoglycemia Symptoms:   Shakiness with BG at 80.      Other general findings: none    Recent hospitalization:   none    - Date of diabetes diagnosis: ~    Ongoing factors affecting care:   - follows with eye doctor at least yearly  - follows with podiatry every few months  - ASCVD (CAD w hx LICHA to LAD)  - CKD    Objective   There were no vitals taken for this visit.    Past Medical History:   Diagnosis Date    Anemia     Arthritis     Diabetes mellitus (HCC)     Dyspepsia 2011    Enlarged prostate     High blood pressure     Hyperlipidemia     Reflux esophagitis     Risk for falls     Tachycardia

## 2024-07-01 NOTE — TELEPHONE ENCOUNTER
Medication:   Requested Prescriptions     Pending Prescriptions Disp Refills    metoprolol succinate (TOPROL XL) 25 MG extended release tablet [Pharmacy Med Name: METOPROLOL SUCC ER 25 MG TAB] 90 tablet 3     Sig: TAKE ONE TABLET BY MOUTH EVERY MORNING       Last Filled:  7/10/2023    Patient Phone Number: [phone] (home)     Last appt: 4/8/2024   Next appt: 7/8/2024

## 2024-07-02 RX ORDER — METOPROLOL SUCCINATE 25 MG/1
TABLET, EXTENDED RELEASE ORAL
Qty: 90 TABLET | Refills: 3 | Status: SHIPPED | OUTPATIENT
Start: 2024-07-02

## 2024-07-08 ENCOUNTER — OFFICE VISIT (OUTPATIENT)
Dept: FAMILY MEDICINE CLINIC | Age: 69
End: 2024-07-08

## 2024-07-08 VITALS
DIASTOLIC BLOOD PRESSURE: 70 MMHG | SYSTOLIC BLOOD PRESSURE: 126 MMHG | HEART RATE: 77 BPM | HEIGHT: 67 IN | OXYGEN SATURATION: 96 % | BODY MASS INDEX: 33.53 KG/M2 | WEIGHT: 213.6 LBS | TEMPERATURE: 98.9 F

## 2024-07-08 DIAGNOSIS — N18.32 STAGE 3B CHRONIC KIDNEY DISEASE (HCC): ICD-10-CM

## 2024-07-08 DIAGNOSIS — N18.32 TYPE 2 DIABETES MELLITUS WITH STAGE 3B CHRONIC KIDNEY DISEASE, WITH LONG-TERM CURRENT USE OF INSULIN (HCC): Primary | ICD-10-CM

## 2024-07-08 DIAGNOSIS — I25.10 CORONARY ARTERY DISEASE INVOLVING NATIVE HEART, UNSPECIFIED VESSEL OR LESION TYPE, UNSPECIFIED WHETHER ANGINA PRESENT: ICD-10-CM

## 2024-07-08 DIAGNOSIS — Z79.4 TYPE 2 DIABETES MELLITUS WITH STAGE 3B CHRONIC KIDNEY DISEASE, WITH LONG-TERM CURRENT USE OF INSULIN (HCC): Primary | ICD-10-CM

## 2024-07-08 DIAGNOSIS — I10 ESSENTIAL HYPERTENSION: ICD-10-CM

## 2024-07-08 DIAGNOSIS — E11.22 TYPE 2 DIABETES MELLITUS WITH STAGE 3B CHRONIC KIDNEY DISEASE, WITH LONG-TERM CURRENT USE OF INSULIN (HCC): Primary | ICD-10-CM

## 2024-07-08 LAB — HBA1C MFR BLD: 7.6 %

## 2024-07-08 SDOH — ECONOMIC STABILITY: FOOD INSECURITY: WITHIN THE PAST 12 MONTHS, YOU WORRIED THAT YOUR FOOD WOULD RUN OUT BEFORE YOU GOT MONEY TO BUY MORE.: NEVER TRUE

## 2024-07-08 SDOH — ECONOMIC STABILITY: FOOD INSECURITY: WITHIN THE PAST 12 MONTHS, THE FOOD YOU BOUGHT JUST DIDN'T LAST AND YOU DIDN'T HAVE MONEY TO GET MORE.: NEVER TRUE

## 2024-07-08 SDOH — ECONOMIC STABILITY: INCOME INSECURITY: HOW HARD IS IT FOR YOU TO PAY FOR THE VERY BASICS LIKE FOOD, HOUSING, MEDICAL CARE, AND HEATING?: NOT HARD AT ALL

## 2024-07-08 ASSESSMENT — ANXIETY QUESTIONNAIRES
3. WORRYING TOO MUCH ABOUT DIFFERENT THINGS: NOT AT ALL
6. BECOMING EASILY ANNOYED OR IRRITABLE: NOT AT ALL
1. FEELING NERVOUS, ANXIOUS, OR ON EDGE: NOT AT ALL
GAD7 TOTAL SCORE: 0
2. NOT BEING ABLE TO STOP OR CONTROL WORRYING: NOT AT ALL
7. FEELING AFRAID AS IF SOMETHING AWFUL MIGHT HAPPEN: NOT AT ALL
5. BEING SO RESTLESS THAT IT IS HARD TO SIT STILL: NOT AT ALL
4. TROUBLE RELAXING: NOT AT ALL

## 2024-07-08 ASSESSMENT — PATIENT HEALTH QUESTIONNAIRE - PHQ9
SUM OF ALL RESPONSES TO PHQ QUESTIONS 1-9: 0
2. FEELING DOWN, DEPRESSED OR HOPELESS: NOT AT ALL
SUM OF ALL RESPONSES TO PHQ9 QUESTIONS 1 & 2: 0
1. LITTLE INTEREST OR PLEASURE IN DOING THINGS: NOT AT ALL
SUM OF ALL RESPONSES TO PHQ QUESTIONS 1-9: 0

## 2024-07-08 NOTE — PROGRESS NOTES
Diagnosis Orders   1. Type 2 diabetes mellitus with stage 3b chronic kidney disease, with long-term current use of insulin (HCC), last A1c controlled POCT glycosylated hemoglobin (Hb A1C), continue current management      2. Stage 3b chronic kidney disease (HCC), stable Continue follow-up with nephrology      3. Essential hypertension, controlled Continue current management      4. Coronary artery disease involving native heart, unspecified vessel or lesion type, unspecified whether angina present, asymptomatic and stable Continue current management with cardiology follow-up with cardiology follow-up          Follow-up in 3 months, or sooner if needed        O: /70 (Site: Right Upper Arm, Position: Sitting, Cuff Size: Medium Adult)   Pulse 77   Temp 98.9 °F (37.2 °C)   Ht 1.702 m (5' 7\")   Wt 96.9 kg (213 lb 9.6 oz)   SpO2 96%   BMI 33.45 kg/m²   Gen- NAD, pleasant  Neck-supple, no lymphadenopathy appreciated  HEENT- Eyes without icterus or injection  Lungs- CTAB  Heart- RRR  Abdomen- Soft, non tender  Ext- No edema  Psych- Appropriate, euthymic, no si/hi    S: CC-chronic disease management    MATHEW-Raul presents for follow-up of diabetes, hypertension, CAD.  He reports he is doing well.  His fasting sugars are often below 120.  He denies any chest pain, dyspnea, palpitations.  His sciatica pain has pretty much resolved.  He we will be scheduling eye doctor follow-up appointment.    ROS- Per HPI    Patient's medications, allergies, and past medical hx were reviewed     Kinga Shaikh DO

## 2024-07-14 ENCOUNTER — TELEPHONE (OUTPATIENT)
Dept: FAMILY MEDICINE CLINIC | Age: 69
End: 2024-07-14

## 2024-07-14 DIAGNOSIS — E11.22 TYPE 2 DIABETES MELLITUS WITH STAGE 3B CHRONIC KIDNEY DISEASE, WITH LONG-TERM CURRENT USE OF INSULIN (HCC): Primary | ICD-10-CM

## 2024-07-14 DIAGNOSIS — Z79.4 TYPE 2 DIABETES MELLITUS WITH STAGE 3B CHRONIC KIDNEY DISEASE, WITH LONG-TERM CURRENT USE OF INSULIN (HCC): Primary | ICD-10-CM

## 2024-07-14 DIAGNOSIS — N18.32 TYPE 2 DIABETES MELLITUS WITH STAGE 3B CHRONIC KIDNEY DISEASE, WITH LONG-TERM CURRENT USE OF INSULIN (HCC): Primary | ICD-10-CM

## 2024-07-14 NOTE — TELEPHONE ENCOUNTER
Please call Bill to see if he can make lab appt to have his A1C re-checked, there may have been a processing issue. I have ordered A1C that would be sent to lab if he is ok doing it before next visit.

## 2024-07-16 ENCOUNTER — LAB (OUTPATIENT)
Dept: FAMILY MEDICINE CLINIC | Age: 69
End: 2024-07-16
Payer: MEDICARE

## 2024-07-16 DIAGNOSIS — N18.32 TYPE 2 DIABETES MELLITUS WITH STAGE 3B CHRONIC KIDNEY DISEASE, WITH LONG-TERM CURRENT USE OF INSULIN (HCC): ICD-10-CM

## 2024-07-16 DIAGNOSIS — E11.22 TYPE 2 DIABETES MELLITUS WITH STAGE 3B CHRONIC KIDNEY DISEASE, WITH LONG-TERM CURRENT USE OF INSULIN (HCC): ICD-10-CM

## 2024-07-16 DIAGNOSIS — Z79.4 TYPE 2 DIABETES MELLITUS WITH STAGE 3B CHRONIC KIDNEY DISEASE, WITH LONG-TERM CURRENT USE OF INSULIN (HCC): ICD-10-CM

## 2024-07-16 LAB
EST. AVERAGE GLUCOSE BLD GHB EST-MCNC: 151.3 MG/DL
HBA1C MFR BLD: 6.9 %

## 2024-07-16 PROCEDURE — 36415 COLL VENOUS BLD VENIPUNCTURE: CPT | Performed by: FAMILY MEDICINE

## 2024-08-06 DIAGNOSIS — N18.32 TYPE 2 DIABETES MELLITUS WITH STAGE 3B CHRONIC KIDNEY DISEASE, WITH LONG-TERM CURRENT USE OF INSULIN (HCC): Primary | ICD-10-CM

## 2024-08-06 DIAGNOSIS — Z79.4 TYPE 2 DIABETES MELLITUS WITH STAGE 3B CHRONIC KIDNEY DISEASE, WITH LONG-TERM CURRENT USE OF INSULIN (HCC): Primary | ICD-10-CM

## 2024-08-06 DIAGNOSIS — E11.22 TYPE 2 DIABETES MELLITUS WITH STAGE 3B CHRONIC KIDNEY DISEASE, WITH LONG-TERM CURRENT USE OF INSULIN (HCC): Primary | ICD-10-CM

## 2024-08-16 RX ORDER — PANTOPRAZOLE SODIUM 40 MG/1
40 TABLET, DELAYED RELEASE ORAL
Qty: 90 TABLET | Refills: 3 | Status: SHIPPED | OUTPATIENT
Start: 2024-08-16

## 2024-08-16 NOTE — TELEPHONE ENCOUNTER
Medication:   Requested Prescriptions     Pending Prescriptions Disp Refills    pantoprazole (PROTONIX) 40 MG tablet 90 tablet 3     Sig: Take 1 tablet by mouth every morning (before breakfast)        Last Filled:  08/15/2023    Patient Phone Number: [phone] (home)     Last appt: 7/8/2024   Next appt: 10/8/2024    Last OARRS:        No data to display

## 2024-09-10 ENCOUNTER — PHARMACY VISIT (OUTPATIENT)
Dept: PHARMACY | Age: 69
End: 2024-09-10
Payer: MEDICARE

## 2024-09-10 DIAGNOSIS — E11.8 DIABETES MELLITUS TYPE 2 WITH COMPLICATIONS (HCC): Primary | ICD-10-CM

## 2024-09-10 PROCEDURE — 99213 OFFICE O/P EST LOW 20 MIN: CPT | Performed by: SPEECH-LANGUAGE PATHOLOGIST

## 2024-09-11 RX ORDER — PIOGLITAZONEHYDROCHLORIDE 45 MG/1
45 TABLET ORAL DAILY
Qty: 30 TABLET | Refills: 5 | Status: SHIPPED | OUTPATIENT
Start: 2024-09-11

## 2024-10-08 ENCOUNTER — OFFICE VISIT (OUTPATIENT)
Dept: FAMILY MEDICINE CLINIC | Age: 69
End: 2024-10-08

## 2024-10-08 VITALS
OXYGEN SATURATION: 95 % | HEART RATE: 92 BPM | DIASTOLIC BLOOD PRESSURE: 68 MMHG | WEIGHT: 212.8 LBS | SYSTOLIC BLOOD PRESSURE: 130 MMHG | BODY MASS INDEX: 33.33 KG/M2

## 2024-10-08 DIAGNOSIS — I10 ESSENTIAL HYPERTENSION: ICD-10-CM

## 2024-10-08 DIAGNOSIS — Z23 NEEDS FLU SHOT: ICD-10-CM

## 2024-10-08 DIAGNOSIS — N18.32 STAGE 3B CHRONIC KIDNEY DISEASE (HCC): ICD-10-CM

## 2024-10-08 DIAGNOSIS — I25.10 CORONARY ARTERY DISEASE INVOLVING NATIVE HEART, UNSPECIFIED VESSEL OR LESION TYPE, UNSPECIFIED WHETHER ANGINA PRESENT: ICD-10-CM

## 2024-10-08 DIAGNOSIS — E11.00 TYPE 2 DIABETES MELLITUS WITH HYPEROSMOLARITY WITHOUT COMA, WITHOUT LONG-TERM CURRENT USE OF INSULIN (HCC): Primary | ICD-10-CM

## 2024-10-08 NOTE — PROGRESS NOTES
Diagnosis Orders   1. Type 2 diabetes mellitus with hyperosmolarity without coma, without long-term current use of insulin (HCC)        2. Stage 3b chronic kidney disease (HCC)        3. Coronary artery disease involving native heart, unspecified vessel or lesion type, unspecified whether angina present        4. Needs flu shot  Influenza, FLUAD Trivalent, (age 65 y+), IM, Preservative Free, 0.5mL      5. Essential hypertension          His diabetes was reasonably controlled on last check, continue current medication management with diabetes pharmacy clinic follow-up (collaborative agreement)    CKD wise, stable, continue working closely with nephrology    CAD wise, he is stable and asymptomatic, continue with current risk factor optimization, and cardiology follow-up    Hypertension, controlled, continue current medication management    He would like to get flu shot today, he declines other immunizations    Follow-up in 3 months, or sooner if needed        O: /68   Pulse 92   Wt 96.5 kg (212 lb 12.8 oz)   SpO2 95%   BMI 33.33 kg/m²   Gen- NAD, pleasant  Neck-supple, no lymphadenopathy appreciated  HEENT- Eyes without icterus or injection  Lungs- CTAB  Heart- RRR  Abdomen- Soft, non tender  Ext- No edema, arthritic findings of hand appreciated  Psych- Appropriate, euthymic, no si/hi    S: CC-chronic disease manage    HPI-Bill presents hypertension, CKD, chronic pain, CAD.  He reports he is doing great.  His GFR is stable.  His blood pressure is reasonably controlled.  His pain is significantly improved by gabapentin.  He does have some arthritic pain of his hands.  He denies any chest pain, shortness of breath, palpitations.  He continues to see nephrology, cardiology, and pharmacist specializing in diabetes.    ROS- Per HPI    Patient's medications, allergies, and past medical hx were reviewed     Kinga Shaikh DO

## 2024-10-14 RX ORDER — GABAPENTIN 100 MG/1
CAPSULE ORAL
Qty: 120 CAPSULE | Refills: 5 | Status: SHIPPED | OUTPATIENT
Start: 2024-10-14 | End: 2024-11-13

## 2024-10-14 NOTE — TELEPHONE ENCOUNTER
Please Advise        Medication:   Requested Prescriptions     Pending Prescriptions Disp Refills    gabapentin (NEURONTIN) 100 MG capsule [Pharmacy Med Name: GABAPENTIN 100 MG CAPSULE] 120 capsule 5     Sig: TAKE 2 CAPSULES BY MOUTH EVERY MORNING TAKE TWO CAPSULES BY MOUTH EVERY NIGHT AT BEDTIME        Last Filled:9/16/2024       Patient Phone Number: [phone] (home)     Last appt: 10/8/2024   Next appt: 1/8/2025    Last OARRS:        No data to display

## 2024-10-15 RX ORDER — BLOOD-GLUCOSE METER
EACH MISCELLANEOUS
Qty: 100 STRIP | Refills: 11 | Status: SHIPPED | OUTPATIENT
Start: 2024-10-15

## 2024-10-15 NOTE — TELEPHONE ENCOUNTER
Last office visit 10/8/2024       Next office visit scheduled 1/8/2025    Requested Prescriptions     Pending Prescriptions Disp Refills    blood glucose test strips ([employer] GLUCOSE TEST) strip [Pharmacy Med Name: [employer] GLUCOSE TEST STRIPS] 100 strip 11     Sig: USE ONE NEW STRIP TO TEST TWO TIMES A DAY

## 2024-10-31 RX ORDER — FERROUS SULFATE 325(65) MG
TABLET, DELAYED RELEASE (ENTERIC COATED) ORAL
Qty: 180 TABLET | Refills: 1 | Status: SHIPPED | OUTPATIENT
Start: 2024-10-31

## 2024-10-31 NOTE — TELEPHONE ENCOUNTER
Last office visit 10/8/2024       Next office visit scheduled 1/8/2025    Requested Prescriptions     Pending Prescriptions Disp Refills    ferrous sulfate (FE TABS 325) 325 (65 Fe) MG EC tablet [Pharmacy Med Name: FERROUS SULF  MG TABLET] 180 tablet 1     Sig: TAKE 1 TABLET BY MOUTH 2 TIMES A DAY IN THE MORNING AND AT BEDTIME

## 2024-11-19 ENCOUNTER — PHARMACY VISIT (OUTPATIENT)
Dept: PHARMACY | Age: 69
End: 2024-11-19
Payer: MEDICARE

## 2024-11-19 DIAGNOSIS — E11.8 DIABETES MELLITUS TYPE 2 WITH COMPLICATIONS (HCC): Primary | ICD-10-CM

## 2024-11-19 PROCEDURE — 99213 OFFICE O/P EST LOW 20 MIN: CPT | Performed by: SPEECH-LANGUAGE PATHOLOGIST

## 2024-11-19 NOTE — PATIENT INSTRUCTIONS
Patient Instructions:  Continue to take your glipizide 10 mg two tablets a half hour before breakfast and two tablets a half hour before dinner.  Continue Lantus 14-16 units nightly and Actos 45 mg daily.  Call and schedule an appointment with Dr. Gilmore. [phone].  You can try checking your blood sugar on occasion two hours after a meal and see how different meals/foods affect your blood sugars.  Continue to limit foods that are high in carbohydrates like potato chips.   Work to keep your carbohydrates at 45-60 grams per meal  Continue to check your blood sugar every morning.   Continue to walk 30 minutes daily.  Continue your iron medication until your doctor instructs you differently.  Continue to work to lose weight.  Please call me if you start getting blood sugars below 70. Let us know if your sugars increase above 250.

## 2024-11-21 VITALS — OXYGEN SATURATION: 99 % | HEART RATE: 77 BPM | SYSTOLIC BLOOD PRESSURE: 132 MMHG | DIASTOLIC BLOOD PRESSURE: 72 MMHG

## 2024-11-21 NOTE — PROGRESS NOTES
Tachycardia     Temporal arteritis (HCC)     Urinary retention 05/26/2017    Urinary stone 06/18/2019    stent placed 6/18/2019     Social History     Tobacco Use    Smoking status: Never    Smokeless tobacco: Never   Substance Use Topics    Alcohol use: No     Pertinent Labs:    Lab Results   Component Value Date    LABA1C 6.9 07/16/2024    LABA1C 7.6 07/08/2024    LABA1C 6.6 04/08/2024     No results found for: \"KEVAN\"  Glutamic Acid Decarboxylase Antibody Ref Range & Units: 0.0 - 5.0 IU/mL    No results found for: \"ISLET\"  Anti-Islet Cell Antibody Ref Range & Units: <1:4    Lab Results   Component Value Date    CPEPTIDE 8.7 (H) 05/08/2023     C-Peptide Ref Range & Units:1.1 - 4.4 ng/mL    Lab Results   Component Value Date    CREATININE 2.10 (H) 08/20/2024    BUN 28 (H) 08/20/2024     08/20/2024    K 4.4 08/20/2024     08/20/2024    CO2 24 08/20/2024     Lab Results   Component Value Date/Time    CREATININE 2.10 08/20/2024 11:31 AM    CREATININE 1.7 04/08/2024 12:26 PM    CREATININE 1.9 12/08/2023 12:20 PM    CREATININE 1.8 05/08/2023 10:29 AM    CREATININE 1.5 02/22/2023 04:45 AM    CREATININE 1.7 02/18/2023 10:44 AM     Lab Results   Component Value Date/Time    MALBCR 376.8 05/08/2023 10:29 AM     Lab Results   Component Value Date    CHOL 135 06/02/2023    TRIG 261 (H) 06/02/2023    HDL 43 06/02/2023    LDLDIRECT 103 (H) 02/06/2023     Lab Results   Component Value Date    LDLDIRECT 103 (H) 02/06/2023     Lab Results   Component Value Date/Time    ALT 16 08/20/2024 11:31 AM     ASCVD risk: The 10-year ASCVD risk score (Chiquis MORTENSEN, et al., 2019) is: 36.4%    Values used to calculate the score:      Age: 69 years      Sex: Male      Is Non- : No      Diabetic: Yes      Tobacco smoker: No      Systolic Blood Pressure: 132 mmHg      Is BP treated: Yes      HDL Cholesterol: 43 mg/dL      Total Cholesterol: 135 mg/dL    Weight:  Wt Readings from Last 3 Encounters:   10/08/24 96.5 kg

## 2024-12-11 RX ORDER — HYDRALAZINE HYDROCHLORIDE 25 MG/1
25 TABLET, FILM COATED ORAL 3 TIMES DAILY
Qty: 270 TABLET | Refills: 3 | Status: SHIPPED | OUTPATIENT
Start: 2024-12-11

## 2024-12-11 NOTE — TELEPHONE ENCOUNTER
Last office visit :10/08/2024  patient     Future Appointments   Date Time Provider Department Center   1/8/2025 11:15 AM Ortega Bledsoe MD DENT [facility]   2/4/2025 11:00 AM SCHEDULE, WE[facility] SCHEDULE [facility]

## 2025-01-08 ENCOUNTER — OFFICE VISIT (OUTPATIENT)
Dept: FAMILY MEDICINE CLINIC | Age: 70
End: 2025-01-08

## 2025-01-08 VITALS
RESPIRATION RATE: 17 BRPM | HEART RATE: 93 BPM | SYSTOLIC BLOOD PRESSURE: 128 MMHG | BODY MASS INDEX: 33.97 KG/M2 | OXYGEN SATURATION: 95 % | DIASTOLIC BLOOD PRESSURE: 64 MMHG | HEIGHT: 67 IN | WEIGHT: 216.4 LBS

## 2025-01-08 DIAGNOSIS — N18.32 TYPE 2 DIABETES MELLITUS WITH STAGE 3B CHRONIC KIDNEY DISEASE, WITH LONG-TERM CURRENT USE OF INSULIN (HCC): ICD-10-CM

## 2025-01-08 DIAGNOSIS — K57.92 DIVERTICULITIS: ICD-10-CM

## 2025-01-08 DIAGNOSIS — G56.01 CARPAL TUNNEL SYNDROME OF RIGHT WRIST: ICD-10-CM

## 2025-01-08 DIAGNOSIS — D80.9 IMMUNODEFICIENCY WITH PREDOMINANTLY ANTIBODY DEFECTS, UNSPECIFIED (HCC): ICD-10-CM

## 2025-01-08 DIAGNOSIS — M19.041 ARTHRITIS OF RIGHT HAND: ICD-10-CM

## 2025-01-08 DIAGNOSIS — E11.42 DIABETIC POLYNEUROPATHY ASSOCIATED WITH TYPE 2 DIABETES MELLITUS (HCC): ICD-10-CM

## 2025-01-08 DIAGNOSIS — R20.2 PARESTHESIAS IN RIGHT HAND: ICD-10-CM

## 2025-01-08 DIAGNOSIS — N18.32 STAGE 3B CHRONIC KIDNEY DISEASE (HCC): Primary | ICD-10-CM

## 2025-01-08 DIAGNOSIS — Z79.4 TYPE 2 DIABETES MELLITUS WITH STAGE 3B CHRONIC KIDNEY DISEASE, WITH LONG-TERM CURRENT USE OF INSULIN (HCC): ICD-10-CM

## 2025-01-08 DIAGNOSIS — D80.2 SELECTIVE DEFICIENCY OF IMMUNOGLOBULIN A (IGA) (HCC): ICD-10-CM

## 2025-01-08 DIAGNOSIS — E11.22 TYPE 2 DIABETES MELLITUS WITH STAGE 3B CHRONIC KIDNEY DISEASE, WITH LONG-TERM CURRENT USE OF INSULIN (HCC): ICD-10-CM

## 2025-01-08 DIAGNOSIS — I25.10 CORONARY ARTERY DISEASE INVOLVING NATIVE HEART, UNSPECIFIED VESSEL OR LESION TYPE, UNSPECIFIED WHETHER ANGINA PRESENT: ICD-10-CM

## 2025-01-08 DIAGNOSIS — F51.01 PRIMARY INSOMNIA: ICD-10-CM

## 2025-01-08 LAB
CHOLEST SERPL-MCNC: 132 MG/DL (ref 0–199)
CRP SERPL-MCNC: <3 MG/L (ref 0–5.1)
ERYTHROCYTE [SEDIMENTATION RATE] IN BLOOD BY WESTERGREN METHOD: 45 MM/HR (ref 0–20)
HDLC SERPL-MCNC: 40 MG/DL (ref 40–60)
LDLC SERPL CALC-MCNC: 59 MG/DL
RHEUMATOID FACT SER IA-ACNC: <10 IU/ML
TRIGL SERPL-MCNC: 166 MG/DL (ref 0–150)
VLDLC SERPL CALC-MCNC: 33 MG/DL

## 2025-01-08 RX ORDER — AMITRIPTYLINE HYDROCHLORIDE 10 MG/1
TABLET ORAL
Qty: 90 TABLET | Refills: 0 | Status: SHIPPED | OUTPATIENT
Start: 2025-01-08

## 2025-01-08 SDOH — ECONOMIC STABILITY: FOOD INSECURITY: WITHIN THE PAST 12 MONTHS, THE FOOD YOU BOUGHT JUST DIDN'T LAST AND YOU DIDN'T HAVE MONEY TO GET MORE.: NEVER TRUE

## 2025-01-08 SDOH — ECONOMIC STABILITY: FOOD INSECURITY: WITHIN THE PAST 12 MONTHS, YOU WORRIED THAT YOUR FOOD WOULD RUN OUT BEFORE YOU GOT MONEY TO BUY MORE.: NEVER TRUE

## 2025-01-08 ASSESSMENT — PATIENT HEALTH QUESTIONNAIRE - PHQ9
SUM OF ALL RESPONSES TO PHQ QUESTIONS 1-9: 0
2. FEELING DOWN, DEPRESSED OR HOPELESS: NOT AT ALL
1. LITTLE INTEREST OR PLEASURE IN DOING THINGS: NOT AT ALL
SUM OF ALL RESPONSES TO PHQ9 QUESTIONS 1 & 2: 0

## 2025-01-08 NOTE — PATIENT INSTRUCTIONS
For your sleep and neuropathy, we are starting amitriptyline 10mg nightly. Increase to two pills at bed time after 2 weeks.     Also wear a wrist splint (prescription sent for this) to wear on your right wrist at night for possible carpal tunnel.     We'll check labs today for arthritis cause, A1c, and your cholesterol.     We'll treat diverticulitis with antibiotics for your abdominal pain.

## 2025-01-08 NOTE — PROGRESS NOTES
Everything documented was personally performed at this visit with the necessary additions, deletions and changes made as appropriate. Voice recognition software may have been used to facilitate documentation and note may have errors due to use of this software. Please reach out if any questions about contents of this note.        Ortega Bledsoe MD  Med-Peds: Primary Care  [address] [address]  Office Phone: [phone]  Office Fax: [phone]

## 2025-01-08 NOTE — ASSESSMENT & PLAN NOTE
Stable, following with nephrology.   SGLT2-I was prohibitively expensive, but if this changes would be a good medication to add.

## 2025-01-09 LAB
ANA SER QL IA: NEGATIVE
CCP IGG SERPL-ACNC: <0.5 U/ML (ref 0–2.9)
EST. AVERAGE GLUCOSE BLD GHB EST-MCNC: 157.1 MG/DL
HBA1C MFR BLD: 7.1 %

## 2025-01-14 ENCOUNTER — TELEPHONE (OUTPATIENT)
Dept: FAMILY MEDICINE CLINIC | Age: 70
End: 2025-01-14

## 2025-01-14 NOTE — TELEPHONE ENCOUNTER
Dose appropriate.   EGFR > 30: No renal dose adjustment necessary.     Indication of acute diverticulitis is rational for TID vs BID dosing.     Rational of 825-125mg rather then higher individual dose is due to lower cost.     Please update pharmacy

## 2025-01-14 NOTE — TELEPHONE ENCOUNTER
Pt called stating the pharmacy told him the dosage for the amoxicillin-clavulanate (AUGMENTIN) 875-125 MG per tablet was way to high for him to take 3 times a day. Pt want clarification for dosage. Pt can be reached at [phone].

## 2025-01-15 NOTE — TELEPHONE ENCOUNTER
Pt came in asking about medication status. Pt said  amoxicillin-clavulanate (AUGMENTIN) 875-125 MG per tablet, pharm told him dos to high to take. Pt has been trying to get meds for a week. Pt [facility], pt can be reached at [phone]

## 2025-01-20 ENCOUNTER — TELEPHONE (OUTPATIENT)
Dept: FAMILY MEDICINE CLINIC | Age: 70
End: 2025-01-20

## 2025-01-20 NOTE — TELEPHONE ENCOUNTER
Pt called in for refill of his amoxacillin and needles for his insulin. He said that he is out of his amoxicillin. To be called [facility]

## 2025-01-20 NOTE — TELEPHONE ENCOUNTER
He should be done with antibiotics, we were using it to treat presumed diverticulitis. If he his still having abdominal pain, then we should move forward with a  CT of his abdomen since it is unlikely that he would continue to have diverticulitis.

## 2025-01-20 NOTE — TELEPHONE ENCOUNTER
Spoke to patient , he states that he was never got his medication that the pharmacy wont fill it for him. I personally spoke to the pharmacy on the 15 th and they said they will get the medication ready.

## 2025-02-04 ENCOUNTER — PHARMACY VISIT (OUTPATIENT)
Dept: PHARMACY | Age: 70
End: 2025-02-04
Payer: MEDICARE

## 2025-02-04 DIAGNOSIS — E11.9 TYPE 2 DIABETES MELLITUS WITHOUT COMPLICATION, WITH LONG-TERM CURRENT USE OF INSULIN (HCC): Primary | ICD-10-CM

## 2025-02-04 DIAGNOSIS — Z79.4 TYPE 2 DIABETES MELLITUS WITHOUT COMPLICATION, WITH LONG-TERM CURRENT USE OF INSULIN (HCC): Primary | ICD-10-CM

## 2025-02-04 PROCEDURE — 99212 OFFICE O/P EST SF 10 MIN: CPT | Performed by: SPEECH-LANGUAGE PATHOLOGIST

## 2025-02-04 NOTE — PROGRESS NOTES
[facility]  Outpatient [facility]  Pharmacy:Diabetes Service    [address], [place] [address]  Phone: [phone]  Fax: [phone]    NAME: Caden Dowell Jr.  MEDICAL RECORD NUMBER:  [id number]  AGE: 69 y.o.   GENDER: male  : 1955  EPISODE DATE:  2025     Caden Dowell was referred to the Wellness Center by  Dr. Curry  for Diabetes services. Special Instructions per the Referral Include: Patient Education and Medication Management      ICD-10-CM    1. Type 2 diabetes mellitus without complication, with long-term current use of insulin ([facility])  E11.9     Z79.4         Referral goals: No diabetes goals were included on the referral     If diabetes goals are not included on the referral, ADA guidelines will be used.     Francisca Negrete is a 69 y.o. here for the Diabetes Service for self-management education, medication review including over the counter medications and herbal products, overall wellbeing assessment, transition of care and any needed adjustments with updates and recommendations communicated to the referring physician.       Patient findings:     Hyperglycemia Symptoms:    Denies polyuria  Denies polydipsia  Denies polyphagia  Denies vision changes  Denies fatigue  Denies symptoms of neuropathy  Denies foot ulcerations  Denies nausea  Denies vomiting  Denies slow to heal    Hypoglycemia Symptoms:   Shakiness with BG at 80.      Other general findings: none    Recent hospitalization:   none    - Date of diabetes diagnosis: ~    Ongoing factors affecting care:   - follows with eye doctor at least yearly  - follows with podiatry every few months  - ASCVD (CAD w hx LICHA to LAD)  - CKD    Objective   There were no vitals taken for this visit.    Past Medical History:   Diagnosis Date    Anemia     Arthritis     Diabetes mellitus (HCC)     Dyspepsia 2011    Enlarged prostate     High blood pressure     Hyperlipidemia     Reflux

## 2025-02-04 NOTE — PATIENT INSTRUCTIONS
Continue to take your glipizide 10 mg two tablets a half hour before breakfast and two tablets a half hour before dinner.  Continue Lantus 14-16 units nightly and Actos 45 mg daily.  Call and schedule an appointment with Dr. Gilmore. [phone].  You can try checking your blood sugar on occasion two hours after a meal and see how different meals/foods affect your blood sugars.  Work to keep your carbohydrates at 45-60 grams per meal  Continue to check your blood sugar every morning.   Continue to walk 30 minutes daily.  Continue your iron medication until your doctor instructs you differently.  Continue to work to lose weight.  Please call me if you start getting blood sugars below 70. Let us know if your sugars increase above 250.

## 2025-02-10 ENCOUNTER — OFFICE VISIT (OUTPATIENT)
Dept: FAMILY MEDICINE CLINIC | Age: 70
End: 2025-02-10

## 2025-02-10 VITALS
WEIGHT: 212 LBS | HEIGHT: 67 IN | TEMPERATURE: 97.9 F | DIASTOLIC BLOOD PRESSURE: 68 MMHG | HEART RATE: 90 BPM | BODY MASS INDEX: 33.27 KG/M2 | SYSTOLIC BLOOD PRESSURE: 110 MMHG | OXYGEN SATURATION: 97 %

## 2025-02-10 DIAGNOSIS — R10.32 LEFT LOWER QUADRANT ABDOMINAL PAIN: ICD-10-CM

## 2025-02-10 DIAGNOSIS — M65.4 DE QUERVAIN'S TENOSYNOVITIS, BILATERAL: Primary | ICD-10-CM

## 2025-02-10 DIAGNOSIS — K42.9 UMBILICAL HERNIA WITHOUT OBSTRUCTION AND WITHOUT GANGRENE: ICD-10-CM

## 2025-02-10 NOTE — PROGRESS NOTES
[facility] - Primary Care   PCP progress note     Patient: Caden Dowell Jr. : 1955  Sex: male  MRN: [id number]    Assessment and Plan:     Assessment & Plan  De Quervain's tenosynovitis, bilateral  Symptoms and exam are concerning for de Quervain's and we have tried conservative management, does not seem to be improving.  I am concerned that he is not able to fully close his right hand as well as lack of improvement of his other symptoms. I would like him to have consultation with hand Ortho.    Orders:    Dariel Epstein MD, Hand Surgery (Hand, Wrist, Upper Extremity), [facility]    Umbilical hernia without obstruction and without gangrene  Still present on exam         Left lower quadrant abdominal pain  At previous visit thought that this may be diverticulitis, but symptoms do not seem as consistent with that this visit.   Plan for CT abdomen to rule out hernia.  I do not believe that his umbilical hernia is causing his symptoms    Orders:    CT ABDOMEN PELVIS WO CONTRAST Additional Contrast? None; Future         -Risks and benefit as well as most common side effects of new medications were discussed with patient.  -Recommended immunizations and screenings as noted in patients care gaps report. Patient was agreeable to screening tests for appropriate indications as listed below       Patient's questions answered. Additional information printed on AVS.  Patient/Caregiver verbalizes understanding of plan of care/instructions discussed today.       LOS Today       Follow-up Information  Return if symptoms worsen or fail to improve, for Follow up after the CT scan.      Subjective:  HPI:   Chief Complaint   Patient presents with    Follow-up        Right hand still symptomatic.   Has an aching pain and occasionally gets a sharp pain. Feels it on ulnar side or salas hand. Still having pain in right wrist. Has been using a wristband rather than ladonna splint  Using voltaren gel without 
Bronchitis

## 2025-02-10 NOTE — PATIENT INSTRUCTIONS
To schedule your CT please call our scheduling number at [phone]      Stop taking amitriptyline 10mg tablets at night and start taking 25mg tablet Amitryptiline instead.

## 2025-02-12 RX ORDER — CLOPIDOGREL BISULFATE 75 MG/1
75 TABLET ORAL DAILY
Qty: 90 TABLET | Refills: 3 | Status: SHIPPED | OUTPATIENT
Start: 2025-02-12

## 2025-02-12 NOTE — TELEPHONE ENCOUNTER
Last OV: 8/29/23   Next OV: X due yearly  Last refill: 2/5/24 #90 3 R/F  Most recent Labs: 1/8/25  Last EKG (if needed):

## 2025-02-13 ENCOUNTER — HOSPITAL ENCOUNTER (OUTPATIENT)
Dept: CT IMAGING | Age: 70
Discharge: HOME | End: 2025-02-13
Attending: INTERNAL MEDICINE
Payer: MEDICARE

## 2025-02-13 DIAGNOSIS — R10.32 LEFT LOWER QUADRANT ABDOMINAL PAIN: ICD-10-CM

## 2025-02-13 PROCEDURE — 74176 CT ABD & PELVIS W/O CONTRAST: CPT

## 2025-02-14 ENCOUNTER — TELEPHONE (OUTPATIENT)
Dept: FAMILY MEDICINE CLINIC | Age: 70
End: 2025-02-14

## 2025-02-14 RX ORDER — TAMSULOSIN HYDROCHLORIDE 0.4 MG/1
0.8 CAPSULE ORAL DAILY
Qty: 90 CAPSULE | Refills: 3
Start: 2025-02-14

## 2025-02-17 ENCOUNTER — OFFICE VISIT (OUTPATIENT)
Dept: ORTHOPEDIC SURGERY | Age: 70
End: 2025-02-17
Payer: MEDICARE

## 2025-02-17 VITALS — RESPIRATION RATE: 14 BRPM | BODY MASS INDEX: 33.27 KG/M2 | WEIGHT: 212 LBS | HEIGHT: 67 IN

## 2025-02-17 DIAGNOSIS — M79.641 PAIN OF RIGHT HAND: Primary | ICD-10-CM

## 2025-02-17 DIAGNOSIS — M65.30 TRIGGER FINGER, ACQUIRED: ICD-10-CM

## 2025-02-17 PROCEDURE — 1159F MED LIST DOCD IN RCRD: CPT | Performed by: PHYSICIAN ASSISTANT

## 2025-02-17 PROCEDURE — 20550 NJX 1 TENDON SHEATH/LIGAMENT: CPT | Performed by: PHYSICIAN ASSISTANT

## 2025-02-17 PROCEDURE — G8427 DOCREV CUR MEDS BY ELIG CLIN: HCPCS | Performed by: PHYSICIAN ASSISTANT

## 2025-02-17 PROCEDURE — 1125F AMNT PAIN NOTED PAIN PRSNT: CPT | Performed by: PHYSICIAN ASSISTANT

## 2025-02-17 PROCEDURE — G8417 CALC BMI ABV UP PARAM F/U: HCPCS | Performed by: PHYSICIAN ASSISTANT

## 2025-02-17 PROCEDURE — 99203 OFFICE O/P NEW LOW 30 MIN: CPT | Performed by: PHYSICIAN ASSISTANT

## 2025-02-17 PROCEDURE — 1036F TOBACCO NON-USER: CPT | Performed by: PHYSICIAN ASSISTANT

## 2025-02-17 PROCEDURE — 1123F ACP DISCUSS/DSCN MKR DOCD: CPT | Performed by: PHYSICIAN ASSISTANT

## 2025-02-17 PROCEDURE — 3017F COLORECTAL CA SCREEN DOC REV: CPT | Performed by: PHYSICIAN ASSISTANT

## 2025-02-17 RX ORDER — BUPIVACAINE HYDROCHLORIDE 2.5 MG/ML
0.5 INJECTION, SOLUTION INFILTRATION; PERINEURAL ONCE
Status: COMPLETED | OUTPATIENT
Start: 2025-02-17 | End: 2025-02-17

## 2025-02-17 RX ORDER — TRIAMCINOLONE ACETONIDE 40 MG/ML
20 INJECTION, SUSPENSION INTRA-ARTICULAR; INTRAMUSCULAR ONCE
Status: COMPLETED | OUTPATIENT
Start: 2025-02-17 | End: 2025-02-17

## 2025-02-17 RX ADMIN — BUPIVACAINE HYDROCHLORIDE 1.25 MG: 2.5 INJECTION, SOLUTION INFILTRATION; PERINEURAL at 16:42

## 2025-02-17 RX ADMIN — TRIAMCINOLONE ACETONIDE 20 MG: 40 INJECTION, SUSPENSION INTRA-ARTICULAR; INTRAMUSCULAR at 16:44

## 2025-02-17 RX ADMIN — BUPIVACAINE HYDROCHLORIDE 1.25 MG: 2.5 INJECTION, SOLUTION INFILTRATION; PERINEURAL at 16:43

## 2025-02-17 RX ADMIN — TRIAMCINOLONE ACETONIDE 20 MG: 40 INJECTION, SUSPENSION INTRA-ARTICULAR; INTRAMUSCULAR at 16:43

## 2025-02-17 NOTE — PROGRESS NOTES
Mr. Caden Dowell Jr. is a 69 y.o. right handed man  who is seen today in Hand Surgical Consultation at the request of Ortega Bledsoe MD.    He presents today regarding right symptoms which have been present for approximately 6 months.  A history of antecedent trauma or injury is Absent.  He reports symptoms to include moderate pain and stiffness with no popping, catching or locking of the right Middle Finger and Ring Finger.  Finger symptoms are not Frequently worse in the morning or overnight.  He reports moderate pain located at the base of the symptomatic finger(s). Symptoms are worsening over time.  He state    Previous treatment has included conservative measures.  He does not claim relation of his symptoms to his required work activities.  He has not undergone any form of testing.    He also presents today regarding right sided symptoms which have been present for approximately 6 months.  A history of antecedent trauma or injury is Absent.  He reports symptoms to include mild numbness & tingling in the palm that radiates in to the small finger.  Hand symptoms do not Frequently awaken him from sleep.  He reports mild pain located in the palmar right wrist. Symptoms show no change over time. He states that he has neuropathy and is wondering if it has worsened.     I have today reviewed with Caden Dowell Jr. the clinically relevant, past medical history, medications, allergies,  family history, social history, and Review Of Systems & I have documented any details relevant to today's presenting complaints in my history above.  Mr. Caden Dowell Jr.'s self-reported past medical history, medications, allergies,  family history, social history, and Review Of Systems have been scanned into the chart under the \"Media\" tab.    Physical Exam:  Mr. Caden Dowell Jr.'s most recent vitals:  Vitals  Respirations: 14  Height: 170.2 cm (5' 7\")  Weight - Scale: 96.2 kg (212 lb)    He is well nourished, oriented

## 2025-02-25 ENCOUNTER — CARE COORDINATION (OUTPATIENT)
Dept: CARE COORDINATION | Age: 70
End: 2025-02-25

## 2025-02-25 ENCOUNTER — TELEMEDICINE (OUTPATIENT)
Dept: FAMILY MEDICINE CLINIC | Age: 70
End: 2025-02-25

## 2025-02-25 DIAGNOSIS — Z00.00 MEDICARE ANNUAL WELLNESS VISIT, SUBSEQUENT: Primary | ICD-10-CM

## 2025-02-25 ASSESSMENT — PATIENT HEALTH QUESTIONNAIRE - PHQ9
SUM OF ALL RESPONSES TO PHQ QUESTIONS 1-9: 0
2. FEELING DOWN, DEPRESSED OR HOPELESS: NOT AT ALL
1. LITTLE INTEREST OR PLEASURE IN DOING THINGS: NOT AT ALL
SUM OF ALL RESPONSES TO PHQ9 QUESTIONS 1 & 2: 0
SUM OF ALL RESPONSES TO PHQ QUESTIONS 1-9: 0

## 2025-02-25 NOTE — PROGRESS NOTES
Medicare Annual Wellness Visit    Caden Dowell Jr. is here for Medicare AWV    Assessment & Plan   Medicare annual wellness visit, subsequent     Return if symptoms worsen or fail to improve.     Subjective       Patient's complete Health Risk Assessment and screening values have been reviewed and are found in Flowsheets. The following problems were reviewed today and where indicated follow up appointments were made and/or referrals ordered.    Positive Risk Factor Screenings with Interventions:    Fall Risk:  Do you feel unsteady or are you worried about falling? : (!) yes  2 or more falls in past year?: no  Fall with injury in past year?: no     Interventions:    Reviewed medications, home hazards, visual acuity, and co-morbidities that can increase risk for falls               Abnormal BMI (obese):  There is no height or weight on file to calculate BMI. (!) Abnormal  Interventions:  See AVS for additional education material        Dentist Screen:  Have you seen the dentist within the past year?: (!) No    Intervention:  Recommend going to see dentist, discussed calling insurance to find a new dentist if needed     Vision Screen:  Do you have difficulty driving, watching TV, or doing any of your daily activities because of your eyesight?: No  Have you had an eye exam within the past year?: (!) No  Interventions:   Has an opthalmologic on [facility].       Advanced Directives:  Do you have a Living Will?: (!) No    Intervention:  has NO advanced directive  - referred to ACP Coordinator                              Objective    Patient-Reported Vitals  No data recorded             No Known Allergies  Prior to Visit Medications    Medication Sig Taking? Authorizing Provider   tamsulosin (FLOMAX) 0.4 MG capsule Take 2 capsules by mouth daily Yes Ortega Bledsoe MD   clopidogrel (PLAVIX) 75 MG tablet TAKE 1 TABLET BY MOUTH DAILY Yes Floyd Gilmore MD   amitriptyline (ELAVIL) 25 MG tablet Take 1 tablet by

## 2025-02-25 NOTE — PATIENT INSTRUCTIONS
may be eating something different.  Find what works best for you. If you do not have time or do not like to cook, a program that offers meal replacement bars or shakes may be better for you. Or if you like to prepare meals, finding a plan that includes daily menus and recipes may be best.  Ask your doctor about other health professionals who can help you achieve your weight-loss goals.  A dietitian can help you make healthy changes in your diet.  An exercise specialist or  can help you develop a safe and effective exercise program.  A counselor or psychiatrist can help you cope with issues such as depression, anxiety, or family problems that can make it hard to focus on weight loss.  Consider joining a support group for people who are trying to lose weight. Your doctor can suggest groups in your area.  Where can you learn more?  Go to https://www.[employer].net/patientEd and enter U357 to learn more about \"Starting a Weight-Loss Plan: Care Instructions.\"  Current as of: April 30, 2024  Content Version: 14.3  © 2024 [employer].   Care instructions adapted under license by [employer]. If you have questions about a medical condition or this instruction, always ask your healthcare professional. [employer], disclaims any warranty or liability for your use of this information.         Advance Directives: Care Instructions  Overview  An advance directive is a legal way to state your wishes at the end of your life. It tells your family and your doctor what to do if you can't say what you want.  There are two main types of advance directives. You can change them any time your wishes change.  Living will.  This form tells your family and your doctor your wishes about life support and other treatment. The form is also called a declaration.  Medical power of .  This form lets you name a person to make treatment decisions for you when you can't speak for yourself. This person is

## 2025-02-27 ENCOUNTER — CARE COORDINATION (OUTPATIENT)
Dept: CARE COORDINATION | Age: 70
End: 2025-02-27

## 2025-02-27 SDOH — HEALTH STABILITY: MENTAL HEALTH: HOW OFTEN DO YOU HAVE A DRINK CONTAINING ALCOHOL?: NEVER

## 2025-02-27 SDOH — ECONOMIC STABILITY: FOOD INSECURITY: WITHIN THE PAST 12 MONTHS, THE FOOD YOU BOUGHT JUST DIDN'T LAST AND YOU DIDN'T HAVE MONEY TO GET MORE.: NEVER TRUE

## 2025-02-27 SDOH — SOCIAL STABILITY: SOCIAL NETWORK: HOW OFTEN DO YOU GET TOGETHER WITH FRIENDS OR RELATIVES?: MORE THAN THREE TIMES A WEEK

## 2025-02-27 SDOH — HEALTH STABILITY: MENTAL HEALTH
STRESS IS WHEN SOMEONE FEELS TENSE, NERVOUS, ANXIOUS, OR CAN'T SLEEP AT NIGHT BECAUSE THEIR MIND IS TROUBLED. HOW STRESSED ARE YOU?: NOT AT ALL

## 2025-02-27 SDOH — HEALTH STABILITY: PHYSICAL HEALTH: ON AVERAGE, HOW MANY DAYS PER WEEK DO YOU ENGAGE IN MODERATE TO STRENUOUS EXERCISE (LIKE A BRISK WALK)?: 6 DAYS

## 2025-02-27 SDOH — HEALTH STABILITY: MENTAL HEALTH: HOW MANY STANDARD DRINKS CONTAINING ALCOHOL DO YOU HAVE ON A TYPICAL DAY?: PATIENT DOES NOT DRINK

## 2025-02-27 SDOH — ECONOMIC STABILITY: INCOME INSECURITY: IN THE LAST 12 MONTHS, WAS THERE A TIME WHEN YOU WERE NOT ABLE TO PAY THE MORTGAGE OR RENT ON TIME?: NO

## 2025-02-27 SDOH — SOCIAL STABILITY: SOCIAL NETWORK: ARE YOU MARRIED, WIDOWED, DIVORCED, SEPARATED, NEVER MARRIED, OR LIVING WITH A PARTNER?: MARRIED

## 2025-02-27 SDOH — ECONOMIC STABILITY: FOOD INSECURITY: WITHIN THE PAST 12 MONTHS, YOU WORRIED THAT YOUR FOOD WOULD RUN OUT BEFORE YOU GOT MONEY TO BUY MORE.: NEVER TRUE

## 2025-02-27 SDOH — HEALTH STABILITY: PHYSICAL HEALTH: ON AVERAGE, HOW MANY MINUTES DO YOU ENGAGE IN EXERCISE AT THIS LEVEL?: 20 MIN

## 2025-02-27 SDOH — ECONOMIC STABILITY: INCOME INSECURITY: HOW HARD IS IT FOR YOU TO PAY FOR THE VERY BASICS LIKE FOOD, HOUSING, MEDICAL CARE, AND HEATING?: NOT HARD AT ALL

## 2025-02-27 NOTE — CARE COORDINATION
Ambulatory Care Coordination Note     2025 8:18 AM     Patient Current Location:  Home: [address] [place] [id number]     This patient was received as a referral from Provider.    ACM contacted the patient by telephone. Verified name and  with patient as identifiers. Provided introduction to self, and explanation of the ACM role.   Patient accepted care management services at this time.          ACM: Nayla Rondon RN     Challenges to be reviewed by the provider   Additional needs identified to be addressed with provider No  none               Method of communication with provider: phone.    Utilization: Initial Call - N/A    Care Summary Note: Spoke to patient for initial cc screening from pcp referral. Lives with spouse. No dme needed for gait. Not on oxygen. Does not monitor bp but will if symptomatic. Does monitor bs daily in morning. Last A1c 1.8.25 7.1%. SDOH reviewed. Independent in adl's, no needs in home. Still drives to appointments. Is interested in sw referral for living will,ad,acp docs information. Referral sent to [facility] for 3.3.25. Will follow up to see how I can assist further.  Plan:  Send educ mc dm,cad,htn,ckd  Fu  referral living will 3.3.25  Med check    Offered patient enrollment in the Remote Patient Monitoring (RPM) program for in-home monitoring: Yes, but did not enroll at this time: controlled chronic disease management and already monitoring with home equipment.     Assessments Completed:       2025     8:12 AM   Amb Fall Risk Assessment and TUG Test   Do you feel unsteady or are you worried about falling?  no   2 or more falls in past year? no   Fall with injury in past year? no    ,   Ambulatory Care Coordination Assessment    Care Coordination Protocol  Referral from Primary Care Provider: Yes  Week 1 - Initial Assessment     Do you have all of your prescriptions and are they filled?: Yes  Barriers to medication adherence: None  Are you able to afford your

## 2025-03-03 ENCOUNTER — CARE COORDINATION (OUTPATIENT)
Dept: CARE COORDINATION | Age: 70
End: 2025-03-03

## 2025-03-03 NOTE — CARE COORDINATION
Advance Care Planning Note      Date: 3/3/2025    Patient Current Location:  [place]    ACP Specialist:  LUCIANO Hoskins    Date Referral Received:3/3/25    Initial Outreach:     Attempted outreach call to patient in follow-up to referral from: Ambulatory Care Manager [facility]  requesting assistance with new advance directive completion. Unable to reach patient.    Outreach Attempts:   HIPAA compliant voicemail left for patient.     Follow Up:   Plan for next outreach in 1 week.        Thank you for this referral.      Blanche CHOWDARY, LUCIANO     [phone]

## 2025-03-10 ENCOUNTER — CARE COORDINATION (OUTPATIENT)
Dept: CARE COORDINATION | Age: 70
End: 2025-03-10

## 2025-03-10 NOTE — CARE COORDINATION
Advance Care Planning Note      Date: 3/10/2025    Patient Current Location:  [place]    ACP Specialist:  LUCIANO Hoskins    Date Referral Received:3/3/25    Second Outreach:     Outreach call to patient in follow-up to ACP Specialist. Patient agreeable to receive AD packet via mail. Will send mail request to [facility] and will follow up with patient accordingly.     Next Step:    Mailed Advance Directive  Outreach again in 2 weeks        Thank you for this referral.    Blanche Rahman MSW, LUCIANO     [phone]

## 2025-03-18 ENCOUNTER — CARE COORDINATION (OUTPATIENT)
Dept: CARE COORDINATION | Age: 70
End: 2025-03-18

## 2025-03-18 NOTE — CARE COORDINATION
Ambulatory Care Coordination Note     3/18/2025 8:53 AM     Patient Current Location:  Home: [address] [place] [id number]     ACM contacted the patient by telephone. Verified name and  with patient as identifiers.         ACM: Nayla Rondon RN     Challenges to be reviewed by the provider   Additional needs identified to be addressed with provider No  none               Method of communication with provider: phone.    Utilization: Has the patient been seen in the ED since your last call? no    Care Summary Note: Spoke to patient for follow up. Doing about the same. No new s/s to report. Still waiting on packet from  in the mail. Compliant with medication adherence. No current needs. Advised I will send education handouts to  r/t dm,cad,ckd,htn. Patient liked this idea. Has my contact information if needs arise before my next outreach call.  Plan:  Fu -living will status  [facility] sent  Med check,caregaps    Offered patient enrollment in the Remote Patient Monitoring (RPM) program for in-home monitoring: Yes, but did not enroll at this time: controlled chronic disease management.     Assessments Completed:       2025     8:12 AM   Amb Fall Risk Assessment and TUG Test   Do you feel unsteady or are you worried about falling?  no   2 or more falls in past year? no   Fall with injury in past year? no    ,   Ambulatory Care Coordination Assessment    Care Coordination Protocol  Referral from Primary Care Provider: Yes  Week 1 - Initial Assessment     Do you have all of your prescriptions and are they filled?: Yes  Barriers to medication adherence: None  Are you able to afford your medications?: Yes  How often do you have trouble taking your medications the way you have been told to take them?: I always take them as prescribed.     Do you have Home O2 Therapy?: No      Ability to seek help/take action for Emergent Urgent situations i.e. fire, crime, inclement weather or health crisis.:

## 2025-03-21 DIAGNOSIS — M25.512 SHOULDER PAIN, LEFT: ICD-10-CM

## 2025-03-21 DIAGNOSIS — M75.20 BICEPS TENDONITIS: ICD-10-CM

## 2025-03-21 RX ORDER — AMLODIPINE BESYLATE 10 MG/1
TABLET ORAL
Qty: 90 TABLET | Refills: 2 | Status: SHIPPED | OUTPATIENT
Start: 2025-03-21

## 2025-03-21 RX ORDER — ACETAMINOPHEN 160 MG
2000 TABLET,DISINTEGRATING ORAL DAILY
Qty: 90 CAPSULE | Refills: 3 | Status: SHIPPED | OUTPATIENT
Start: 2025-03-21

## 2025-03-21 NOTE — TELEPHONE ENCOUNTER
Pt called for refill for the amLODIPine (NORVASC) 10 MG tablet and his vitamin d3 called into [facility] [id number] - [place], [place] - [address] - P [phone] - F [phone] he said that he has not had them in a while

## 2025-03-24 ENCOUNTER — CARE COORDINATION (OUTPATIENT)
Dept: CARE COORDINATION | Age: 70
End: 2025-03-24

## 2025-03-24 RX ORDER — PIOGLITAZONE 45 MG/1
45 TABLET ORAL DAILY
Qty: 30 TABLET | Refills: 5 | Status: SHIPPED | OUTPATIENT
Start: 2025-03-24

## 2025-03-24 NOTE — TELEPHONE ENCOUNTER
Pt called for refill of his pioglitazone (ACTOS) 45 MG tablet to be called into [facility] [id number] - [place], [place] - [address] - P [phone] - F [phone]   Pt reports that he has 1 left

## 2025-03-24 NOTE — CARE COORDINATION
SW placed follow up call to patient to ensure receipt of AD packet. Left voicemail and requested return call. Will continue to follow.    Blanche Rahman MSW, LSW     [phone]

## 2025-03-24 NOTE — CARE COORDINATION
SW received call back from patient who confirmed receipt of AD packet. No questions at this time. Will sign off.     Blanche Rahman MSW, LSW     [phone]

## 2025-04-04 ENCOUNTER — CARE COORDINATION (OUTPATIENT)
Dept: CARE COORDINATION | Age: 70
End: 2025-04-04

## 2025-04-04 NOTE — CARE COORDINATION
Ambulatory Care Coordination Note     4/4/2025 10:14 AM     Patient outreach attempt by this ACM today to perform care management follow up . ACM was unable to reach the patient by telephone today;   left voice message requesting a return phone call to this ACM.     ACM: Nayla Rondon RN     Care Summary Note:     PCP/Specialist follow up:   Future Appointments         Provider Specialty Dept Phone    5/6/2025 11:00 AM SCHEDULE, \[facility]\"" SCHEDULE Pharmacy [phone]            Follow Up:   Plan for next ACM outreach in approximately 2 weeks and 3 weeks to complete:  - medication review  - goal progression  - education .

## 2025-04-11 RX ORDER — GLIPIZIDE 10 MG/1
20 TABLET ORAL 2 TIMES DAILY
Qty: 360 TABLET | Refills: 3 | Status: SHIPPED | OUTPATIENT
Start: 2025-04-11

## 2025-04-11 RX ORDER — TAMSULOSIN HYDROCHLORIDE 0.4 MG/1
0.8 CAPSULE ORAL DAILY
Qty: 90 CAPSULE | Refills: 3 | Status: SHIPPED | OUTPATIENT
Start: 2025-04-11

## 2025-04-11 NOTE — TELEPHONE ENCOUNTER
Last office visit :02/25/2025    Future Appointments   Date Time Provider Department Center   5/6/2025 11:00 AM SCHEDULE, [facility] SCHEDULE [facility]

## 2025-04-21 RX ORDER — LOSARTAN POTASSIUM 50 MG/1
50 TABLET ORAL DAILY
Qty: 90 TABLET | Refills: 3 | Status: SHIPPED | OUTPATIENT
Start: 2025-04-21

## 2025-04-21 RX ORDER — PIOGLITAZONE 45 MG/1
45 TABLET ORAL DAILY
Qty: 90 TABLET | Refills: 1 | Status: SHIPPED | OUTPATIENT
Start: 2025-04-21

## 2025-04-21 RX ORDER — ROSUVASTATIN CALCIUM 40 MG/1
40 TABLET, COATED ORAL NIGHTLY
Qty: 60 TABLET | Refills: 0 | Status: SHIPPED | OUTPATIENT
Start: 2025-04-21

## 2025-04-21 NOTE — TELEPHONE ENCOUNTER
Pt called for refill of the losartan (COZAAR) 50 MG tablet and pioglitazone (ACTOS) 45 MG tablet. To be called into [facility] [id number] - [facility] [address] - P [phone] - F [phone]

## 2025-04-24 ENCOUNTER — CARE COORDINATION (OUTPATIENT)
Dept: CARE COORDINATION | Age: 70
End: 2025-04-24

## 2025-04-24 NOTE — CARE COORDINATION
Ambulatory Care Coordination Note     2025 1:27 PM     Patient Current Location:  Home: [address] [place] [id number]     ACM contacted the patient by telephone. Verified name and  with patient as identifiers.         ACM: Nayla Rondon RN     Challenges to be reviewed by the provider   Additional needs identified to be addressed with provider No  none               Method of communication with provider: phone.    Utilization: Has the patient been seen in the ED since your last call? no    Care Summary Note: Doing well. States he will drop off ad/living will to pcp office. In week goes tp dm management fu [place]. Has all medications filled. No new s/s to report. No transportation barriers. No current questions,concerns,needs. Has RN ACM contact information for assistance before next outreach call.  Plan:  Fu dm and neuropathy???  Needs,cc grad, ad on file now did he drop off?    Offered patient enrollment in the Remote Patient Monitoring (RPM) program for in-home monitoring: Yes, but did not enroll at this time: already monitoring with home equipment.     Assessments Completed:   Care Coordination Interventions    Referral from Primary Care Provider: Yes  Suggested Interventions and Community Resources  Diabetes Education: Completed (Comment: see zone section)  Social Work: Completed (Comment: referral to pool for 3.3.25 will and acp)  Zone Management Tools: Completed (Comment: send edu r/t dm,cad,htn,ckd  2025 3.18.25)      ,   Diabetes Assessment    Medic Alert ID: No  Meal Planning: Avoidance of concentrated sweets   How often do you test your blood sugar?: Daily   Do you have barriers with adherence to non-pharmacologic self-management interventions? (Nutrition/Exercise/Self-Monitoring): No   Have you ever had to go to the ED for symptoms of low blood sugar?: No       No patient-reported symptoms         ,   General Assessment    Do you have any symptoms that are causing concern?:

## 2025-05-06 ENCOUNTER — PHARMACY VISIT (OUTPATIENT)
Dept: PHARMACY | Age: 70
End: 2025-05-06
Payer: MEDICARE

## 2025-05-06 DIAGNOSIS — E11.9 TYPE 2 DIABETES MELLITUS WITHOUT COMPLICATION, WITH LONG-TERM CURRENT USE OF INSULIN (HCC): Primary | ICD-10-CM

## 2025-05-06 DIAGNOSIS — Z79.4 TYPE 2 DIABETES MELLITUS WITHOUT COMPLICATION, WITH LONG-TERM CURRENT USE OF INSULIN (HCC): Primary | ICD-10-CM

## 2025-05-06 PROCEDURE — 99213 OFFICE O/P EST LOW 20 MIN: CPT | Performed by: SPEECH-LANGUAGE PATHOLOGIST

## 2025-05-06 NOTE — PROGRESS NOTES
[facility]  Outpatient Wellness Center  Pharmacy:Diabetes Service    [address], [place] [address]  Phone: [phone]  Fax: [phone]    NAME: Caden Dowell Jr.  MEDICAL RECORD NUMBER:  [id number]  AGE: 69 y.o.   GENDER: male  : 1955  EPISODE DATE:  2025     Caden Dowell was referred to the Wellness Center by  Dr. Curry  for Diabetes services. Special Instructions per the Referral Include: Patient Education and Medication Management      ICD-10-CM    1. Type 2 diabetes mellitus without complication, with long-term current use of insulin ([facility], [facility])  E11.9     Z79.4         Referral goals: No diabetes goals were included on the referral     If diabetes goals are not included on the referral, ADA guidelines will be used.     Francisca Negrete is a 69 y.o. here for the Diabetes Service for self-management education, medication review including over the counter medications and herbal products, overall wellbeing assessment, transition of care and any needed adjustments with updates and recommendations communicated to the referring physician.       Patient findings:     Hyperglycemia Symptoms at diagnosis:    Denies polyuria  Denies polydipsia  Denies polyphagia  Denies vision changes  Denies fatigue  Denies symptoms of neuropathy  Denies foot ulcerations  Denies nausea  Denies vomiting  Denies slow to heal    Hypoglycemia Symptoms:   Shakiness with BG at 80.      Other general findings: none    Recent hospitalization:   none    - Date of diabetes diagnosis: ~    Ongoing factors affecting care:   - follows with eye doctor at least yearly  - follows with podiatry every few months  - ASCVD (CAD w hx LICHA to LAD)  - CKD-sees nephrology     Objective   There were no vitals taken for this visit.    Past Medical History:   Diagnosis Date    Anemia     Arthritis     Diabetes mellitus (HCC)     Dyspepsia 2011    Enlarged prostate     High blood pressure

## 2025-05-06 NOTE — PATIENT INSTRUCTIONS
Increase to Lantus 18 units nightly   If you are having any lows in the morning (blood sugar less than 80 mg/dL), go back to Lantus 16 units nightly.   Continue pioglitazone 45 mg daily  Continue glipizide 10 mg two tablets a half hour before breakfast and two tablets a half hour before dinner.  Call and schedule an appointment with Dr. Gilmore. [phone].  Call and schedule an appointment with Dr. Gilmore. [phone].

## 2025-05-15 ENCOUNTER — CARE COORDINATION (OUTPATIENT)
Dept: CARE COORDINATION | Age: 70
End: 2025-05-15

## 2025-05-15 NOTE — CARE COORDINATION
Ambulatory Care Coordination Note     5/15/2025 9:56 AM     Patient Current Location:  Home: [address] [place] [id number]     ACM contacted the patient by telephone. Verified name and  with patient as identifiers.         ACM: Nayla Rondon RN     Challenges to be reviewed by the provider   Additional needs identified to be addressed with provider No  none               Method of communication with provider: phone.    Utilization: Has the patient been seen in the ED since your last call? no    Care Summary Note: Spoke to patient. He tells me he feeling well. He has not filled out ad documents, but if has questions will let this RN or sw know this. His A1c 7.1% 2025. His bp is reported around normal values. No new s/s to report. No issues r/t elimination,sleep,appetite. Gets his iron checked at [place] 5[place]21.25. He has my contact information if needs shall arise before my next outreach.   Plan:  Cc grad?    Offered patient enrollment in the Remote Patient Monitoring (RPM) program for in-home monitoring: Yes, but did not enroll at this time: controlled chronic disease management.     Assessments Completed:       2025     8:12 AM   Amb Fall Risk Assessment and TUG Test   Do you feel unsteady or are you worried about falling?  no   2 or more falls in past year? no   Fall with injury in past year? no    ,   Ambulatory Care Coordination Assessment    Care Coordination Protocol  Referral from Primary Care Provider: Yes  Week 1 - Initial Assessment     Do you have all of your prescriptions and are they filled?: Yes  Barriers to medication adherence: None  Are you able to afford your medications?: Yes  How often do you have trouble taking your medications the way you have been told to take them?: I always take them as prescribed.     Do you have Home O2 Therapy?: No      Ability to seek help/take action for Emergent Urgent situations i.e. fire, crime, inclement weather or health crisis.:

## 2025-06-05 ENCOUNTER — CARE COORDINATION (OUTPATIENT)
Dept: CARE COORDINATION | Age: 70
End: 2025-06-05

## 2025-06-05 NOTE — CARE COORDINATION
Ambulatory Care Coordination Note     2025 10:38 AM     Patient Current Location:  Home: [address] [place] [address]     ACM contacted the patient by telephone. Verified name and  with patient as identifiers.     Patient graduated from the High Risk Care Management program on 2025.  Patient verbalizes confidence in the ability to self-manage at this time. has the ability to self-manage at this time..  Care management goals have been completed. No further Ambulatory Care Manager follow up scheduled.      ACM: Nayla Rondon RN     Challenges to be reviewed by the provider   Additional needs identified to be addressed with provider No  none               Method of communication with provider: phone.    Utilization: Has the patient been seen in the ED since your last call? no    PCP/Specialist follow up:   Future Appointments         Provider Specialty Dept Phone    2025 11:00 AM SCHEDULE, [facility] SCHEDULE Pharmacy [phone]            Follow Up:   No further Ambulatory Care Management follow-up scheduled at this time.  Patient  has Ambulatory Care Manager's contact information for any further questions, concerns or needs.

## 2025-06-16 RX ORDER — ROSUVASTATIN CALCIUM 40 MG/1
TABLET, COATED ORAL
Qty: 60 TABLET | Refills: 0 | Status: SHIPPED | OUTPATIENT
Start: 2025-06-16

## 2025-06-16 NOTE — PROGRESS NOTES
[facility]    Caden Dowell Jr.  1955    June 17, 2025    Reason for Consult: Chest pain     CC: \" I have neuropathy in my hand\"    HPI:  The patient is 69 y.o. male with a past medical history significant for hypertension, type 2 diabetes and possible prior TIA. He was told by his ophthalmologist that he has limited blood flow behind his left eye and this could be related to a prior TIA.  He was previously referred by Dr. Naya Lange regarding an abnormal EKG. His EKG showed sinus tachycardia and did not require a follow up.     Today he presents for follow up and overall he is feeling well.  He reports neuropathy in his right hand and has been getting steroid shots which is helping.  He is walking daily in the morning and is cutting grass.  He is having to to the house chores as his wife had recent knee surgery.  He reports medication compliance and is tolerating. He denies any abnormal bleeding or bruising. He denies exertional chest pain/pressure, dyspnea at rest, worsening LANIER, PND, orthopnea, palpitations, lightheadedness, weight changes, changes in LE edema, and syncope.     Review of Systems:  Constitutional: No fatigue, weakness, night sweats or fever.   HEENT: No new vision difficulties or ringing in the ears.  Respiratory: No new SOB, PND, orthopnea or cough.   Cardiovascular: See HPI   GI: No n/v, diarrhea, constipation, abdominal pain or changes in bowel habits.  No melena, no hematochezia  : No urinary frequency, urgency, incontinence, hematuria or dysuria.  Skin: No cyanosis or skin lesions.  Musculoskeletal: No new muscle or joint pain.  Neurological: positive for right hand neuropathy. No syncope or TIA-like symptoms.  Psychiatric: No anxiety, insomnia or depression     Past Medical History:   Diagnosis Date    Anemia     Arthritis     Diabetes mellitus (HCC)     Dyspepsia 06/08/2011    Enlarged prostate     High blood pressure     Hyperlipidemia     Reflux

## 2025-06-16 NOTE — TELEPHONE ENCOUNTER
Last OV: 23 - -[facility]   Next OV: X  Last Labs: 25   Last EK23    Last Filled: 25    #60     1 tab nightly        0 refills    Patient needs appt. Called and left message to rcto and schedule appt.  Please advise.    Note was on last refill and message was left. Cx appt with Justina on 23

## 2025-06-17 ENCOUNTER — OFFICE VISIT (OUTPATIENT)
Dept: CARDIOLOGY CLINIC | Age: 70
End: 2025-06-17
Payer: MEDICARE

## 2025-06-17 ENCOUNTER — TELEPHONE (OUTPATIENT)
Dept: PHARMACY | Age: 70
End: 2025-06-17

## 2025-06-17 VITALS
BODY MASS INDEX: 33.43 KG/M2 | RESPIRATION RATE: 15 BRPM | HEIGHT: 67 IN | HEART RATE: 93 BPM | WEIGHT: 213 LBS | DIASTOLIC BLOOD PRESSURE: 82 MMHG | OXYGEN SATURATION: 96 % | SYSTOLIC BLOOD PRESSURE: 128 MMHG

## 2025-06-17 DIAGNOSIS — E78.5 HYPERLIPIDEMIA LDL GOAL <70: ICD-10-CM

## 2025-06-17 DIAGNOSIS — I25.10 CORONARY ARTERY DISEASE INVOLVING NATIVE CORONARY ARTERY OF NATIVE HEART WITHOUT ANGINA PECTORIS: Primary | ICD-10-CM

## 2025-06-17 DIAGNOSIS — I10 ESSENTIAL HYPERTENSION: ICD-10-CM

## 2025-06-17 DIAGNOSIS — Z86.39 H/O TYPE 2 DIABETES MELLITUS: ICD-10-CM

## 2025-06-17 PROCEDURE — 1123F ACP DISCUSS/DSCN MKR DOCD: CPT | Performed by: INTERNAL MEDICINE

## 2025-06-17 PROCEDURE — 3074F SYST BP LT 130 MM HG: CPT | Performed by: INTERNAL MEDICINE

## 2025-06-17 PROCEDURE — G8427 DOCREV CUR MEDS BY ELIG CLIN: HCPCS | Performed by: INTERNAL MEDICINE

## 2025-06-17 PROCEDURE — G2211 COMPLEX E/M VISIT ADD ON: HCPCS | Performed by: INTERNAL MEDICINE

## 2025-06-17 PROCEDURE — 3079F DIAST BP 80-89 MM HG: CPT | Performed by: INTERNAL MEDICINE

## 2025-06-17 PROCEDURE — 1036F TOBACCO NON-USER: CPT | Performed by: INTERNAL MEDICINE

## 2025-06-17 PROCEDURE — 3017F COLORECTAL CA SCREEN DOC REV: CPT | Performed by: INTERNAL MEDICINE

## 2025-06-17 PROCEDURE — 93000 ELECTROCARDIOGRAM COMPLETE: CPT | Performed by: INTERNAL MEDICINE

## 2025-06-17 PROCEDURE — G8417 CALC BMI ABV UP PARAM F/U: HCPCS | Performed by: INTERNAL MEDICINE

## 2025-06-17 PROCEDURE — 1159F MED LIST DOCD IN RCRD: CPT | Performed by: INTERNAL MEDICINE

## 2025-06-17 PROCEDURE — 99214 OFFICE O/P EST MOD 30 MIN: CPT | Performed by: INTERNAL MEDICINE

## 2025-06-17 RX ORDER — INSULIN GLARGINE 100 [IU]/ML
18 INJECTION, SOLUTION SUBCUTANEOUS NIGHTLY
Qty: 15 ML | Refills: 2 | Status: SHIPPED | OUTPATIENT
Start: 2025-06-17

## 2025-06-17 NOTE — TELEPHONE ENCOUNTER
Needs insulin refill.    Phoned RX to [facility] pharmacy    Orders Placed This Encounter   Medications    Insulin Glargine Solostar 100 UNIT/ML SOPN     Sig: Inject 18 Units into the skin nightly Please use coupon for 5 pens for $35     Dispense:  15 mL     Refill:  2     Justina Mohamud, PharmD  [facility]   Outpatient Wellness Center  Diabetes Service  [phone]    For Pharmacy Admin Tracking Only    Program: Medication Management  CPA in place:  Yes  Recommendation Provided To: Pharmacy: 1  Intervention Detail: New Rx: 1, reason: Needs Additional Therapy  Intervention Accepted By: Pharmacy: 1  Gap Closed?: Yes   Time Spent (min): 5

## 2025-06-27 DIAGNOSIS — N18.32 TYPE 2 DIABETES MELLITUS WITH STAGE 3B CHRONIC KIDNEY DISEASE, WITH LONG-TERM CURRENT USE OF INSULIN (HCC): Primary | ICD-10-CM

## 2025-06-27 DIAGNOSIS — Z79.4 TYPE 2 DIABETES MELLITUS WITH STAGE 3B CHRONIC KIDNEY DISEASE, WITH LONG-TERM CURRENT USE OF INSULIN (HCC): Primary | ICD-10-CM

## 2025-06-27 DIAGNOSIS — E11.22 TYPE 2 DIABETES MELLITUS WITH STAGE 3B CHRONIC KIDNEY DISEASE, WITH LONG-TERM CURRENT USE OF INSULIN (HCC): Primary | ICD-10-CM

## 2025-07-17 NOTE — TELEPHONE ENCOUNTER
Pt called for refill of his metoprolol succinate (TOPROL XL) 25 MG extended release tablet. He said that he has a few days left of med. To be called into [facility] [id number] - [facility] [address] - P [phone] - F [phone]

## 2025-07-18 RX ORDER — METOPROLOL SUCCINATE 25 MG/1
25 TABLET, EXTENDED RELEASE ORAL EVERY MORNING
Qty: 90 TABLET | Refills: 3 | Status: SHIPPED | OUTPATIENT
Start: 2025-07-18

## 2025-07-28 NOTE — TELEPHONE ENCOUNTER
Pt called for refill of his diclofenac sodium (VOLTAREN) 1 % GEL. Pt is out of med. To be called into [facility] [id number] - [place], [place] - [address] - P [phone] - F [phone]

## 2025-07-28 NOTE — TELEPHONE ENCOUNTER
Requested Prescriptions     Pending Prescriptions Disp Refills    diclofenac sodium (VOLTAREN) 1 %  g 3     Sig: Apply topically 2 times daily     Last OV - 2/25/25  Next OV - none  Last refill - 1/8/25  Last labs - 1/8/25

## 2025-08-05 ENCOUNTER — PHARMACY VISIT (OUTPATIENT)
Dept: PHARMACY | Age: 70
End: 2025-08-05
Payer: MEDICARE

## 2025-08-05 DIAGNOSIS — E11.9 TYPE 2 DIABETES MELLITUS WITHOUT COMPLICATION, WITH LONG-TERM CURRENT USE OF INSULIN (HCC): Primary | ICD-10-CM

## 2025-08-05 DIAGNOSIS — Z79.4 TYPE 2 DIABETES MELLITUS WITHOUT COMPLICATION, WITH LONG-TERM CURRENT USE OF INSULIN (HCC): Primary | ICD-10-CM

## 2025-08-05 PROCEDURE — 99213 OFFICE O/P EST LOW 20 MIN: CPT

## 2025-08-12 RX ORDER — ROSUVASTATIN CALCIUM 40 MG/1
TABLET, COATED ORAL
Qty: 60 TABLET | Refills: 0 | Status: SHIPPED | OUTPATIENT
Start: 2025-08-12

## 2025-08-19 RX ORDER — PANTOPRAZOLE SODIUM 40 MG/1
40 TABLET, DELAYED RELEASE ORAL
Qty: 90 TABLET | Refills: 3 | Status: SHIPPED | OUTPATIENT
Start: 2025-08-19

## (undated) DEVICE — Z DISCONTINUED BY MEDLINE USE 2711682 TRAY SKIN PREP DRY W/ PREM GLV

## (undated) DEVICE — ELECTRODE 8227411 PAIRED 4 CH SET ROHS

## (undated) DEVICE — SUTURE VCRL + SZ 3-0 L18IN ABSRB UD SH 1/2 CIR TAPERCUT NDL VCP864D

## (undated) DEVICE — SYRINGE MED 10ML LUERLOCK TIP W/O SFTY DISP

## (undated) DEVICE — DISSECTOR ENDOSCP L21CM TIP CURVATURE 40DEG FN CRV JAW VES

## (undated) DEVICE — SUTURE COAT VCRL SZ 4-0 L18IN ABSRB UD L19MM PS-2 1/2 CIR J496G

## (undated) DEVICE — SPONGE,DISSECTOR,K,XRAY,9/16"X1/4",STRL: Brand: MEDLINE

## (undated) DEVICE — NEEDLE HYPO 27GA L15IN REG BVL W O SFTY FOR SYR DISPOSABLE

## (undated) DEVICE — GUIDEWIRE URO L150CM DIA0.035IN TAPR 8CM STR TIP STD SHFT

## (undated) DEVICE — MERCY HEALTH WEST TURNOVER: Brand: MEDLINE INDUSTRIES, INC.

## (undated) DEVICE — GLOVE ORANGE PI 7 1/2   MSG9075

## (undated) DEVICE — SUTURE PERMAHAND SZ 2-0 L12X18IN NONABSORBABLE BLK SILK A185H

## (undated) DEVICE — SUTURE PROL SZ 6-0 L18IN NONABSORBABLE BLU L36MM P-1 3/8 8697G

## (undated) DEVICE — TOWEL,OR,DSP,ST,BLUE,STD,4/PK,20PK/CS: Brand: MEDLINE

## (undated) DEVICE — SOLUTION IV IRRIG POUR BRL 0.9% SODIUM CHL 2F7124

## (undated) DEVICE — GOWN SIRUS NONREIN XL W/TWL: Brand: MEDLINE INDUSTRIES, INC.

## (undated) DEVICE — HOOK RETRCT L5MM E SHRP SELF RET SYS LONE STAR

## (undated) DEVICE — SOLUTION SCRB 4OZ 10% POVIDONE IOD ANTIMIC BTL

## (undated) DEVICE — STAPLER SKIN H3.9MM WIRE DIA0.58MM CRWN 6.9MM 35 STPL ROT

## (undated) DEVICE — CATHETER URETH 18FR BLLN 5CC SIL HYDRGEL 2 W F SPEC CARS M

## (undated) DEVICE — BLADE ES ELASTOMERIC COAT INSUL DURABLE BEND UPTO 90DEG

## (undated) DEVICE — DRAINBAG,ANTI-REFLUX TOWER,L/F,2000ML,LL: Brand: MEDLINE

## (undated) DEVICE — 3M™ IOBAN™ 2 ANTIMICROBIAL INCISE DRAPE 6640EZ: Brand: IOBAN™ 2

## (undated) DEVICE — Y-TYPE TUR/BLADDER IRRIGATION SET, REGULATING CLAMP

## (undated) DEVICE — PROBE 8225825 3PK INCREMT STD PRASS ROHS

## (undated) DEVICE — GLOVE SURG SZ 75 L12IN FNGR THK87MIL WHT LTX FREE

## (undated) DEVICE — DRAPE,INSTRUMENT,MAGNETIC,10X16: Brand: MEDLINE

## (undated) DEVICE — PACK,CYSTOSCOPY,PK III,AURORA: Brand: MEDLINE

## (undated) DEVICE — ELECTRODE PT RET AD L9FT HI MOIST COND ADH HYDRGEL CORDED

## (undated) DEVICE — STERILE SURGICAL LUBRICANT, METAL TUBE: Brand: SURGILUBE

## (undated) DEVICE — Z INACTIVE USE 2635503 SOLUTION IRRIG 3000ML ST H2O USP UROMATIC PLAS CONT

## (undated) DEVICE — PENCIL ES L3M BTTN SWCH S STL HEX LOK BLDE ELECTRD HOLSTER

## (undated) DEVICE — SUTURE PERMA-HAND SZ 2-0 L30IN NONABSORBABLE BLK L26MM SH K833H

## (undated) DEVICE — SKIN MARKER REGULAR TIP WITH RULER CAP AND LABELS: Brand: DEVON

## (undated) DEVICE — SOLUTION PREP POVIDONE IOD FOR SKIN MUCOUS MEM PRIOR TO

## (undated) DEVICE — BAG URO DRN URO-CATCHER

## (undated) DEVICE — APPLIER CLP L9.375IN APER 2.1MM CLS L3.8MM 20 SM TI CLP

## (undated) DEVICE — INTENDED FOR TISSUE SEPARATION, AND OTHER PROCEDURES THAT REQUIRE A SHARP SURGICAL BLADE TO PUNCTURE OR CUT.: Brand: BARD-PARKER ® STAINLESS STEEL BLADES

## (undated) DEVICE — COVER LT HNDL BLU PLAS

## (undated) DEVICE — KIT OR ROOM TURNOVER W/STRAP

## (undated) DEVICE — SYRINGE IRRIG 60ML SFT PLIABLE BLB EZ TO GRP 1 HND USE W/

## (undated) DEVICE — GAUZE,SPONGE,4"X4",16PLY,XRAY,STRL,LF: Brand: MEDLINE

## (undated) DEVICE — SOLUTION IV IRRIG WATER 1000ML POUR BRL 2F7114

## (undated) DEVICE — 3M™ STERI-DRAPE™ INSTRUMENT POUCH 1018: Brand: STERI-DRAPE™

## (undated) DEVICE — SUTURE PROL SZ 5-0 L18IN NONABSORBABLE BLU L13MM P-3 3/8 8698G

## (undated) DEVICE — ENT I-LF: Brand: MEDLINE INDUSTRIES, INC.